# Patient Record
Sex: MALE | Race: BLACK OR AFRICAN AMERICAN | Employment: OTHER | URBAN - METROPOLITAN AREA
[De-identification: names, ages, dates, MRNs, and addresses within clinical notes are randomized per-mention and may not be internally consistent; named-entity substitution may affect disease eponyms.]

---

## 2017-03-19 RX ORDER — PHENOBARBITAL 60 MG/1
TABLET ORAL
Qty: 60 TAB | Refills: 0 | Status: SHIPPED | OUTPATIENT
Start: 2017-03-19

## 2017-11-28 ENCOUNTER — HOSPITAL ENCOUNTER (EMERGENCY)
Age: 57
Discharge: HOME | End: 2017-11-28
Attending: EMERGENCY MEDICINE
Payer: MEDICAID

## 2017-11-28 VITALS
DIASTOLIC BLOOD PRESSURE: 77 MMHG | HEART RATE: 88 BPM | BODY MASS INDEX: 19.9 KG/M2 | OXYGEN SATURATION: 97 % | SYSTOLIC BLOOD PRESSURE: 116 MMHG | RESPIRATION RATE: 16 BRPM | WEIGHT: 155 LBS | TEMPERATURE: 98.1 F

## 2017-11-28 DIAGNOSIS — K04.7 DENTAL ABSCESS: Primary | ICD-10-CM

## 2017-11-28 LAB — GLUCOSE BLD STRIP.AUTO-MCNC: 93 MG/DL (ref 70–110)

## 2017-11-28 PROCEDURE — 99283 EMERGENCY DEPT VISIT LOW MDM: CPT

## 2017-11-28 PROCEDURE — 82962 GLUCOSE BLOOD TEST: CPT

## 2017-11-28 PROCEDURE — 74011250637 HC RX REV CODE- 250/637: Performed by: PHYSICIAN ASSISTANT

## 2017-11-28 PROCEDURE — 75810000139 HC PUNCT ASPIRATION ABCESS

## 2017-11-28 RX ORDER — ACETAMINOPHEN AND CODEINE PHOSPHATE 300; 30 MG/1; MG/1
1 TABLET ORAL
Qty: 8 TAB | Refills: 0 | Status: SHIPPED | OUTPATIENT
Start: 2017-11-28 | End: 2021-05-01

## 2017-11-28 RX ORDER — ACETAMINOPHEN 500 MG
1000 TABLET ORAL
Status: COMPLETED | OUTPATIENT
Start: 2017-11-28 | End: 2017-11-28

## 2017-11-28 RX ORDER — PENICILLIN V POTASSIUM 500 MG/1
500 TABLET, FILM COATED ORAL 4 TIMES DAILY
Qty: 28 TAB | Refills: 0 | Status: SHIPPED | OUTPATIENT
Start: 2017-11-28 | End: 2017-12-05

## 2017-11-28 RX ORDER — PENICILLIN V POTASSIUM 250 MG/1
500 TABLET, FILM COATED ORAL
Status: COMPLETED | OUTPATIENT
Start: 2017-11-28 | End: 2017-11-28

## 2017-11-28 RX ADMIN — ACETAMINOPHEN 1000 MG: 500 TABLET ORAL at 12:13

## 2017-11-28 RX ADMIN — PENICILLIN V POTASSIUM 500 MG: 250 TABLET, FILM COATED ORAL at 12:13

## 2017-11-28 NOTE — ED TRIAGE NOTES
Patient states his face has been swelling for the past two days. He says whatever is coming out of it taste really bad. He denies having a dentist at this time.

## 2017-11-28 NOTE — ED PROVIDER NOTES
EMERGENCY DEPARTMENT HISTORY AND PHYSICAL EXAM    11:55 AM      Date: 11/28/2017  Patient Name: Clarita George    History of Presenting Illness     Chief Complaint   Patient presents with    Dental Pain         History Provided By: Patient    Chief Complaint: Dental pain  Duration: 2  Days  Timing:  Constant  Location: upper tooth pain  Quality: Aching  Severity: 6 out of 10  Associated Symptoms: Denies Fever, chills, or throat swelling. 11:56 AM Clarita George is a 59 y.o. male with h/o DM, Sz. And HTN who presents to ED complaining of constant aching upper tooth pain with a pain severity of 6/10  onset 2 days ago. Denies fever, chills, and sore throat. Patient has a shx of tobacco and alcohol use. Has not seen a dentist in years. No further complaints at this time. PCP: Basim Felder MD      PCP: Basim Felder MD    Current Facility-Administered Medications   Medication Dose Route Frequency Provider Last Rate Last Dose    penicillin v potassium (VEETID) tablet 500 mg  500 mg Oral NOW Sonia Gonzalez        acetaminophen (TYLENOL) tablet 1,000 mg  1,000 mg Oral NOW Sonia Gonzalez         Current Outpatient Prescriptions   Medication Sig Dispense Refill    penicillin v potassium (VEETID) 500 mg tablet Take 1 Tab by mouth four (4) times daily for 7 days. 28 Tab 0    acetaminophen-codeine (TYLENOL-CODEINE #3) 300-30 mg per tablet Take 1 Tab by mouth every four (4) hours as needed for Pain. Max Daily Amount: 6 Tabs. 8 Tab 0    PHENobarbital (LUMINAL) 60 mg tablet take 1 tablet by mouth twice a day 60 Tab 0    phenytoin ER (DILANTIN EXTENDED) 100 mg ER capsule Take 300 mg by mouth daily. Indications: EPILEPSY      phenytoin ER (DILANTIN EXTENDED) 100 mg ER capsule Take 200 mg by mouth nightly. Indications: EPILEPSY      metFORMIN (GLUCOPHAGE) 500 mg tablet Take 500 mg by mouth two (2) times daily (with meals).  Indications: TYPE 2 DIABETES MELLITUS      hydrochlorothiazide (HYDRODIURIL) 25 mg tablet Take 25 mg by mouth daily. Past History     Past Medical History:  Past Medical History:   Diagnosis Date    Diabetes ([facility] [address]     Hepatitis C     Hypertension     Seizures ([facility] [address]     last seizure few weeks ago (12/8/14), gets them periodically       Past Surgical History:  Past Surgical History:   Procedure Laterality Date    HX ORTHOPAEDIC Left     foot operation       Family History:  No family history on file. Social History:  Social History   Substance Use Topics    Smoking status: Current Every Day Smoker     Packs/day: 0.50     Years: 44.00    Smokeless tobacco: Never Used    Alcohol use Yes      Comment: occasionally       Allergies:  No Known Allergies      Review of Systems     Review of Systems   Constitutional: Negative for chills and fever. HENT: Positive for dental problem. Negative for sore throat. All other systems reviewed and are negative. Physical Exam     Visit Vitals    /77 (BP 1 Location: Left arm, BP Patient Position: At rest;Sitting)    Pulse 88    Temp 98.1 °F (36.7 °C)    Resp 16    Wt 70.3 kg (155 lb)    SpO2 97%    BMI 19.9 kg/m2       Physical Exam   Constitutional: He is oriented to person, place, and time. He appears well-developed and well-nourished. No distress. HENT:   Head: Normocephalic and atraumatic. Right Ear: External ear normal.   Left Ear: External ear normal.   Nose: Nose normal.   Mouth/Throat: Oropharynx is clear and moist.       Mild left facial edema no extension past mandible. 2 cm left upper gumline abscess. Patent posterior pharynx. No uvula swelling, tolerating secretions. Eyes: Pupils are equal, round, and reactive to light. No scleral icterus. Neck: Normal range of motion. Neck supple. No JVD present. No tracheal deviation present. No thyromegaly present. Cardiovascular: Normal rate, regular rhythm, normal heart sounds and intact distal pulses.   Exam reveals no gallop and no friction rub.    No murmur heard. Pulmonary/Chest: Effort normal and breath sounds normal. No respiratory distress. He has no wheezes. He has no rales. Lymphadenopathy:     He has no cervical adenopathy. Neurological: He is alert and oriented to person, place, and time. No sensory loss, Gait normal, Motor 5/5   Skin: Skin is warm and dry. Psychiatric: He has a normal mood and affect. His behavior is normal. Judgment and thought content normal.   Nursing note and vitals reviewed. Diagnostic Study Results     Labs -  No results found for this or any previous visit (from the past 12 hour(s)). Radiologic Studies -   No orders to display         Medical Decision Making   I am the first provider for this patient. I reviewed the vital signs, available nursing notes, past medical history, past surgical history, family history and social history. Vital Signs-Reviewed the patient's vital signs. Procedures:   Incision and drainage L upper dental abscess:. Pt with written consent for procedure, discussed risks of procedure, including infection, bleeding, or other complication. 1 mL Lidocaine 2% injected into dental abscess, 18 gauge needle used for aspiration, ~3mL of purulent drainage, mild bleeding after, no complications, patient tolerated well. Diagnosis     Clinical Impression:   1. Dental abscess        Disposition: home    Follow-up Information     Follow up With Details Comments Contact Info    [facility]  If symptoms worsen [address] [place] [address] Schedule an appointment as soon as possible for a visit  [phone]  [phone]           Patient's Medications   Start Taking    ACETAMINOPHEN-CODEINE (TYLENOL-CODEINE #3) 300-30 MG PER TABLET    Take 1 Tab by mouth every four (4) hours as needed for Pain. Max Daily Amount: 6 Tabs.     PENICILLIN V POTASSIUM (VEETID) 500 MG TABLET    Take 1 Tab by mouth four (4) times daily for 7 days. Continue Taking    HYDROCHLOROTHIAZIDE (HYDRODIURIL) 25 MG TABLET    Take 25 mg by mouth daily. METFORMIN (GLUCOPHAGE) 500 MG TABLET    Take 500 mg by mouth two (2) times daily (with meals). Indications: TYPE 2 DIABETES MELLITUS    PHENOBARBITAL (LUMINAL) 60 MG TABLET    take 1 tablet by mouth twice a day    PHENYTOIN ER (DILANTIN EXTENDED) 100 MG ER CAPSULE    Take 300 mg by mouth daily. Indications: EPILEPSY    PHENYTOIN ER (DILANTIN EXTENDED) 100 MG ER CAPSULE    Take 200 mg by mouth nightly. Indications: EPILEPSY   These Medications have changed    No medications on file   Stop Taking    HYDROCODONE-ACETAMINOPHEN (NORCO) 5-325 MG PER TABLET    Take 1-2 tablets PO every 4-6 hours as needed for pain control. If over the counter ibuprofen or acetaminophen was suggested, then only take the vicodin for pain not well controlled with the over the counter medication. _______________________________    Attestations:  Scribe Attestation     Terra Noyola acting as a scribe for and in the presence of Levell Carrel, [place]      November 28, 2017 at 11:55 AM       Provider Attestation:      I personally performed the services described in the documentation, reviewed the documentation, as recorded by the scribe in my presence, and it accurately and completely records my words and actions.  November 28, 2017 at 11:55 AM - Levell Carrel, [place].    _______________________________

## 2017-11-28 NOTE — DISCHARGE INSTRUCTIONS
Abscessed Tooth: Care Instructions  Your Care Instructions    An abscessed tooth is a tooth that has a pocket of pus in the tissues around it. Pus forms when the body tries to fight an infection caused by bacteria. If the pus cannot drain, it forms an abscess. An abscessed tooth can cause red, swollen gums and throbbing pain, especially when you chew. You may have a bad taste in your mouth and a fever, and your jaw may swell. Damage to the tooth, untreated tooth decay, or gum disease can cause an abscessed tooth. An abscessed tooth needs to be treated by a dental professional right away. If it is not treated, the infection could spread to other parts of your body. Your dentist will give you antibiotics to stop the infection. He or she may make a hole in the tooth or cut open (nadege) the abscess inside your mouth so that the infection can drain, which should relieve your pain. You may need to have a root canal treatment, which tries to save your tooth by taking out the infected pulp and replacing it with a healing medicine and/or a filling. If these treatments do not work, your tooth may have to be removed. Follow-up care is a key part of your treatment and safety. Be sure to make and go to all appointments, and call your doctor if you are having problems. It's also a good idea to know your test results and keep a list of the medicines you take. How can you care for yourself at home? · Reduce pain and swelling in your face and jaw by putting ice or a cold pack on the outside of your cheek for 10 to 20 minutes at a time. Put a thin cloth between the ice and your skin. · Take pain medicines exactly as directed. ¨ If the doctor gave you a prescription medicine for pain, take it as prescribed. ¨ If you are not taking a prescription pain medicine, ask your doctor if you can take an over-the-counter medicine. · Take your antibiotics as directed. Do not stop taking them just because you feel better.  You need to take the full course of antibiotics. To prevent tooth abscess  · Brush and floss every day, and have regular dental checkups. · Eat a healthy diet, and avoid sugary foods and drinks. · Do not smoke, use e-cigarettes with nicotine, or use spit tobacco. Tobacco and nicotine slow your ability to heal. Tobacco also increases your risk for gum disease and cancer of the mouth and throat. If you need help quitting, talk to your doctor about stop-smoking programs and medicines. These can increase your chances of quitting for good. When should you call for help? Call 911 anytime you think you may need emergency care. For example, call if:  ? · You have trouble breathing. ?Call your doctor now or seek immediate medical care if:  ? · You have new or worse symptoms of infection, such as:  ¨ Increased pain, swelling, warmth, or redness. ¨ Red streaks leading from the area. ¨ Pus draining from the area. ¨ A fever. ? Watch closely for changes in your health, and be sure to contact your doctor if:  ? · You do not get better as expected. Where can you learn more? Go to http://anibal-tiffany.info/. Enter D808 in the search box to learn more about \"Abscessed Tooth: Care Instructions. \"  Current as of: May 12, 2017  Content Version: 11.4  © 5427-3340 [employer]. Care instructions adapted under license by [employer] (which disclaims liability or warranty for this information). If you have questions about a medical condition or this instruction, always ask your healthcare professional. [place] [address] any warranty or liability for your use of this information. [employer] Activation    Thank you for requesting access to [employer]. Please follow the instructions below to securely access and download your online medical record. [employer] allows you to send messages to your doctor, view your test results, renew your prescriptions, schedule appointments, and more.     How Do I Sign Up? 1. In your internet browser, go to [email or web address]  2. Click on the First Time User? Click Here link in the Sign In box. You will be redirect to the New Member Sign Up page. 3. Enter your [employer] Access Code exactly as it appears below. You will not need to use this code after youve completed the sign-up process. If you do not sign up before the expiration date, you must request a new code. [employer] Access Code: [email or web address]  Expires: 2018 11:56 AM (This is the date your [employer] access code will )    4. Enter the last four digits of your Social Security Number (xxxx) and Date of Birth (mm/dd/yyyy) as indicated and click Submit. You will be taken to the next sign-up page. 5. Create a [employer] IQ ID. This will be your [employer] login ID and cannot be changed, so think of one that is secure and easy to remember. 6. Create a [employer] password. You can change your password at any time. 7. Enter your Password Reset Question and Answer. This can be used at a later time if you forget your password. 8. Enter your e-mail address. You will receive e-mail notification when new information is available in [address] 9. Click Sign Up. You can now view and download portions of your medical record. 10. Click the Download Summary menu link to download a portable copy of your medical information. Additional Information    If you have questions, please visit the Frequently Asked Questions section of the [employer] website at [email or web address] Remember, [employer] is NOT to be used for urgent needs. For medical emergencies, dial 911.

## 2019-04-25 ENCOUNTER — HOSPITAL ENCOUNTER (EMERGENCY)
Age: 59
Discharge: HOME | End: 2019-04-25
Attending: EMERGENCY MEDICINE
Payer: MEDICAID

## 2019-04-25 VITALS
RESPIRATION RATE: 16 BRPM | SYSTOLIC BLOOD PRESSURE: 130 MMHG | OXYGEN SATURATION: 100 % | TEMPERATURE: 97.7 F | DIASTOLIC BLOOD PRESSURE: 88 MMHG | HEART RATE: 64 BPM

## 2019-04-25 DIAGNOSIS — K04.7 DENTAL ABSCESS: Primary | ICD-10-CM

## 2019-04-25 PROCEDURE — 99283 EMERGENCY DEPT VISIT LOW MDM: CPT

## 2019-04-25 PROCEDURE — 74011250637 HC RX REV CODE- 250/637: Performed by: PHYSICIAN ASSISTANT

## 2019-04-25 PROCEDURE — 75810000289 HC I&D ABSCESS SIMP/COMP/MULT

## 2019-04-25 RX ORDER — PENICILLIN V POTASSIUM 500 MG/1
500 TABLET, FILM COATED ORAL 2 TIMES DAILY
Qty: 14 TAB | Refills: 0 | Status: SHIPPED | OUTPATIENT
Start: 2019-04-25 | End: 2019-05-02

## 2019-04-25 RX ORDER — HYDROCODONE BITARTRATE AND ACETAMINOPHEN 5; 325 MG/1; MG/1
1 TABLET ORAL
Qty: 18 TAB | Refills: 0 | Status: SHIPPED | OUTPATIENT
Start: 2019-04-25 | End: 2019-04-28

## 2019-04-25 RX ORDER — OXYCODONE AND ACETAMINOPHEN 5; 325 MG/1; MG/1
1 TABLET ORAL
Status: COMPLETED | OUTPATIENT
Start: 2019-04-25 | End: 2019-04-25

## 2019-04-25 RX ORDER — CHLORHEXIDINE GLUCONATE 1.2 MG/ML
15 RINSE ORAL 2 TIMES DAILY
Qty: 420 ML | Refills: 0 | Status: SHIPPED | OUTPATIENT
Start: 2019-04-25 | End: 2019-05-09

## 2019-04-25 RX ADMIN — OXYCODONE HYDROCHLORIDE AND ACETAMINOPHEN 1 TABLET: 5; 325 TABLET ORAL at 21:47

## 2019-04-26 NOTE — ED NOTES
Discharge instructions explained and pt verbalized understanding. All questions addressed and answered. Patient ambulated out in a stable condition rx3 given

## 2019-04-26 NOTE — ED PROVIDER NOTES
EMERGENCY DEPARTMENT HISTORY AND PHYSICAL EXAM 
 
9:43 PM 
 
 
Date: 4/25/2019 Patient Name: Jenny Infante History of Presenting Illness Chief Complaint Patient presents with  Dental Pain History Provided By: Patient Chief Complaint: dental pain Additional History (Context): Jenny Infante is a 62 y.o. Jeremiah Gray presents with dental pain. Right lower dental pain for the past 3 days. Pain is 10 out of 10. He has swelling of the jaw and a lump under his chin. He denies fevers. He has no trouble swallowing or breathing. He denies fevers. The pain is uncontrolled. Is not taking anything for the pain. PCP: Castillo Heredia MD 
 
Current Facility-Administered Medications Medication Dose Route Frequency Provider Last Rate Last Dose  oxyCODONE-acetaminophen (PERCOCET) 5-325 mg per tablet 1 Tab  1 Tab Oral NOW Sanju Aguilera PA-C Current Outpatient Medications Medication Sig Dispense Refill  chlorhexidine (PERIDEX) 0.12 % solution 15 mL by Swish and Spit route two (2) times a day for 14 days. 420 mL 0  
 penicillin v potassium (VEETID) 500 mg tablet Take 1 Tab by mouth two (2) times a day for 7 days. 14 Tab 0  
 HYDROcodone-acetaminophen (NORCO) 5-325 mg per tablet Take 1 Tab by mouth every four (4) hours as needed for Pain for up to 3 days. Max Daily Amount: 6 Tabs. 18 Tab 0  
 acetaminophen-codeine (TYLENOL-CODEINE #3) 300-30 mg per tablet Take 1 Tab by mouth every four (4) hours as needed for Pain. Max Daily Amount: 6 Tabs. 8 Tab 0  
 PHENobarbital (LUMINAL) 60 mg tablet take 1 tablet by mouth twice a day 60 Tab 0  phenytoin ER (DILANTIN EXTENDED) 100 mg ER capsule Take 300 mg by mouth daily. Indications: EPILEPSY  phenytoin ER (DILANTIN EXTENDED) 100 mg ER capsule Take 200 mg by mouth nightly. Indications: EPILEPSY  metFORMIN (GLUCOPHAGE) 500 mg tablet Take 500 mg by mouth two (2) times daily (with meals). Indications: TYPE 2 DIABETES MELLITUS  hydrochlorothiazide (HYDRODIURIL) 25 mg tablet Take 25 mg by mouth daily. Past History Past Medical History: 
Past Medical History:  
Diagnosis Date  Diabetes ([facility] [address]  Hepatitis C   
 Hypertension  Seizures ([facility] [address]   
 last seizure few weeks ago (12/8/14), gets them periodically Past Surgical History: 
Past Surgical History:  
Procedure Laterality Date  HX ORTHOPAEDIC Left   
 foot operation Family History: No family history on file. Social History: 
Social History Tobacco Use  Smoking status: Current Every Day Smoker Packs/day: 0.50 Years: 44.00 Pack years: 22.00  Smokeless tobacco: Never Used Substance Use Topics  Alcohol use: Yes Comment: occasionally  Drug use: No  
 
 
Allergies: 
No Known Allergies Review of Systems Review of Systems Constitutional: Negative for fever. HENT: Positive for dental problem and facial swelling. Eyes: Negative for visual disturbance. Respiratory: Negative for shortness of breath. Cardiovascular: Negative for chest pain. Gastrointestinal: Negative for abdominal pain. Genitourinary: Negative for dysuria. Musculoskeletal: Negative for neck pain. Skin: Negative for rash. Neurological: Negative for dizziness. Psychiatric/Behavioral: Negative for confusion. All other systems reviewed and are negative. Physical Exam  
 
Visit Vitals /86 (BP 1 Location: Left arm, BP Patient Position: At rest) Pulse 64 Temp 98.6 °F (37 °C) Resp 16 SpO2 100% Physical Exam  
Constitutional: He appears well-developed and well-nourished. No distress. HENT:  
Head: Normocephalic and atraumatic. Dental abscess adjacent to tooth 30 through 27 with gingival swelling, fluctuance and tenderness. There is also a palpable enlarged lymph nodes sub-mental.  No diffuse swelling of the neck. Airway is patent. Eyes: Conjunctivae are normal.  
Neck: Normal range of motion. Neck supple. Cardiovascular: Normal rate. Pulmonary/Chest: Effort normal.  
Abdominal: Soft. Musculoskeletal: Normal range of motion. Neurological: He is alert. Skin: Skin is warm and dry. He is not diaphoretic. Psychiatric: He has a normal mood and affect. Nursing note and vitals reviewed. Diagnostic Study Results Labs - No results found for this or any previous visit (from the past 12 hour(s)). Radiologic Studies - No orders to display Medical Decision Making I am the first provider for this patient. I reviewed the vital signs, available nursing notes, past medical history, past surgical history, family history and social history. Vital Signs-Reviewed the patient's vital signs. Records Reviewed: Nursing Notes (Time of Review: 9:43 PM) ED Course: Progress Notes, Reevaluation, and Consults: 
 
Provider Notes (Medical Decision Making): MDM Number of Diagnoses or Management Options Dental abscess:  
Diagnosis management comments: Dental abscess drained today. Started on antibiotics and Peridex. Referred to dentist. 
 
  
I&D Abcess Simple Date/Time: 4/25/2019 9:45 PM 
Performed by: Marilee Corrigan PA-C Authorized by: Marilee Corrigan PA-C Consent:  
  Consent obtained:  Verbal 
  Consent given by:  Patient Risks discussed:  Bleeding, damage to other organs, incomplete drainage, infection and pain Alternatives discussed:  Alternative treatment Location:  
  Type:  Abscess Location:  Mouth Mouth location:  Alveolar process Anesthesia (see MAR for exact dosages): Anesthesia method:  Local infiltration Local anesthetic:  Lidocaine 1% w/o epi Procedure type:  
  Complexity:  Simple Procedure details:  
  Needle aspiration: no Incision types:  Single straight Incision depth:  Submucosal 
  Scalpel blade:  11 Wound management:  Irrigated with saline Drainage:  Purulent Drainage amount: Moderate Wound treatment:  Wound left open Packing materials:  None Post-procedure details:  
  Patient tolerance of procedure: Tolerated well, no immediate complications Diagnosis Clinical Impression: 1. Dental abscess Disposition: Discharged Follow-up Information Follow up With Specialties Details Why Contact Info [address] [place]  [address] 
[phone] [address]    [phone] [facility] EMERGENCY DEPT Emergency Medicine  Immediately if symptoms worsen Yoanna [address] Patient's Medications Start Taking CHLORHEXIDINE (PERIDEX) 0.12 % SOLUTION    15 mL by Swish and Spit route two (2) times a day for 14 days. HYDROCODONE-ACETAMINOPHEN (NORCO) 5-325 MG PER TABLET    Take 1 Tab by mouth every four (4) hours as needed for Pain for up to 3 days. Max Daily Amount: 6 Tabs. PENICILLIN V POTASSIUM (VEETID) 500 MG TABLET    Take 1 Tab by mouth two (2) times a day for 7 days. Continue Taking ACETAMINOPHEN-CODEINE (TYLENOL-CODEINE #3) 300-30 MG PER TABLET    Take 1 Tab by mouth every four (4) hours as needed for Pain. Max Daily Amount: 6 Tabs. HYDROCHLOROTHIAZIDE (HYDRODIURIL) 25 MG TABLET    Take 25 mg by mouth daily. METFORMIN (GLUCOPHAGE) 500 MG TABLET    Take 500 mg by mouth two (2) times daily (with meals). Indications: TYPE 2 DIABETES MELLITUS PHENOBARBITAL (LUMINAL) 60 MG TABLET    take 1 tablet by mouth twice a day PHENYTOIN ER (DILANTIN EXTENDED) 100 MG ER CAPSULE    Take 300 mg by mouth daily. Indications: EPILEPSY PHENYTOIN ER (DILANTIN EXTENDED) 100 MG ER CAPSULE    Take 200 mg by mouth nightly. Indications: EPILEPSY These Medications have changed No medications on file Stop Taking No medications on file  
 
_______________________________ Attestations: 
Landy Attestation Sanju Aguilera PA-C acting as a scribe for and in the presence of L[employer] April 25, 2019 at 9:46 PM 
    
Provider Attestation:     
I personally performed the services described in the documentation, reviewed the documentation, as recorded by the scribe in my presence, and it accurately and completely records my words and actions. April 25, 2019 at 9:46 PM - MAXIMO Pearson 
_______________________________

## 2019-04-26 NOTE — DISCHARGE INSTRUCTIONS
Patient Education        Abscessed Tooth: Care Instructions  Your Care Instructions    An abscessed tooth is a tooth that has a pocket of pus in the tissues around it. Pus forms when the body tries to fight an infection caused by bacteria. If the pus cannot drain, it forms an abscess. An abscessed tooth can cause red, swollen gums and throbbing pain, especially when you chew. You may have a bad taste in your mouth and a fever, and your jaw may swell. Damage to the tooth, untreated tooth decay, or gum disease can cause an abscessed tooth. An abscessed tooth needs to be treated by a dental professional right away. If it is not treated, the infection could spread to other parts of your body. Your dentist will give you antibiotics to stop the infection. He or she may make a hole in the tooth or cut open (nadege) the abscess inside your mouth so that the infection can drain, which should relieve your pain. You may need to have a root canal treatment, which tries to save your tooth by taking out the infected pulp and replacing it with a healing medicine and/or a filling. If these treatments do not work, your tooth may have to be removed. Follow-up care is a key part of your treatment and safety. Be sure to make and go to all appointments, and call your doctor if you are having problems. It's also a good idea to know your test results and keep a list of the medicines you take. How can you care for yourself at home? · Reduce pain and swelling in your face and jaw by putting ice or a cold pack on the outside of your cheek for 10 to 20 minutes at a time. Put a thin cloth between the ice and your skin. · Take pain medicines exactly as directed. ? If the doctor gave you a prescription medicine for pain, take it as prescribed. ? If you are not taking a prescription pain medicine, ask your doctor if you can take an over-the-counter medicine. · Take your antibiotics as directed.  Do not stop taking them just because you feel better. You need to take the full course of antibiotics. To prevent tooth abscess  · Brush and floss every day, and have regular dental checkups. · Eat a healthy diet, and avoid sugary foods and drinks. · Do not smoke, use e-cigarettes with nicotine, or use spit tobacco. Tobacco and nicotine slow your ability to heal. Tobacco also increases your risk for gum disease and cancer of the mouth and throat. If you need help quitting, talk to your doctor about stop-smoking programs and medicines. These can increase your chances of quitting for good. When should you call for help? Call 911 anytime you think you may need emergency care. For example, call if:    · You have trouble breathing.    Call your doctor now or seek immediate medical care if:    · You have new or worse symptoms of infection, such as:  ? Increased pain, swelling, warmth, or redness. ? Red streaks leading from the area. ? Pus draining from the area. ? A fever.    Watch closely for changes in your health, and be sure to contact your doctor if:    · You do not get better as expected. Where can you learn more? Go to http://anibal-tiffany.info/. Enter W330 in the search box to learn more about \"Abscessed Tooth: Care Instructions. \"  Current as of: March 27, 2018  Content Version: 11.9  © 6607-6850 [employer]. Care instructions adapted under license by [employer] (which disclaims liability or warranty for this information). If you have questions about a medical condition or this instruction, always ask your healthcare professional. [place] [address] any warranty or liability for your use of this information.

## 2020-02-22 ENCOUNTER — HOSPITAL ENCOUNTER (EMERGENCY)
Age: 60
Discharge: HOME | End: 2020-02-22
Attending: EMERGENCY MEDICINE
Payer: MEDICAID

## 2020-02-22 VITALS
DIASTOLIC BLOOD PRESSURE: 85 MMHG | HEIGHT: 74 IN | HEART RATE: 91 BPM | WEIGHT: 155 LBS | SYSTOLIC BLOOD PRESSURE: 115 MMHG | TEMPERATURE: 98.3 F | OXYGEN SATURATION: 100 % | RESPIRATION RATE: 18 BRPM | BODY MASS INDEX: 19.89 KG/M2

## 2020-02-22 DIAGNOSIS — H60.501 ACUTE OTITIS EXTERNA OF RIGHT EAR, UNSPECIFIED TYPE: Primary | ICD-10-CM

## 2020-02-22 PROCEDURE — 74011250637 HC RX REV CODE- 250/637: Performed by: STUDENT IN AN ORGANIZED HEALTH CARE EDUCATION/TRAINING PROGRAM

## 2020-02-22 PROCEDURE — 99283 EMERGENCY DEPT VISIT LOW MDM: CPT

## 2020-02-22 RX ORDER — CIPROFLOXACIN AND DEXAMETHASONE 3; 1 MG/ML; MG/ML
4 SUSPENSION/ DROPS AURICULAR (OTIC) 2 TIMES DAILY
Qty: 7.5 ML | Refills: 0 | Status: SHIPPED | OUTPATIENT
Start: 2020-02-22 | End: 2020-02-29

## 2020-02-22 RX ADMIN — CIPROFLOXACIN HYDROCHLORIDE AND HYDROCORTISONE 3 DROP: 2; 10 SUSPENSION AURICULAR (OTIC) at 13:02

## 2020-02-22 NOTE — ED TRIAGE NOTES
PT reports, \"My ear feels like is has been stuffed up X 6 days, worse on the right side, difficulty hearing on right side. \".

## 2020-02-22 NOTE — DISCHARGE INSTRUCTIONS
Patient Education        Swimmer's Ear: Care Instructions  Your Care Instructions    Swimmer's ear (otitis externa) is inflammation or infection of the ear canal. This is the passage that leads from the outer ear to the eardrum. Any water, sand, or other debris that gets into the ear canal and stays there can cause swimmer's ear. Putting cotton swabs or other items in the ear to clean it can also cause this problem. Swimmer's ear can be very painful. But you can treat the pain and infection with medicines. You should feel better in a few days. Follow-up care is a key part of your treatment and safety. Be sure to make and go to all appointments, and call your doctor if you are having problems. It's also a good idea to know your test results and keep a list of the medicines you take. How can you care for yourself at home? Cleaning and care  · Use antibiotic drops as your doctor directs. · Do not insert ear drops (other than the antibiotic ear drops) or anything else into the ear unless your doctor has told you to. · Avoid getting water in the ear until the problem clears up. Use cotton lightly coated with petroleum jelly as an earplug. Do not use plastic earplugs. · Use a hair dryer set on low to carefully dry the ear after you shower. · To ease ear pain, hold a warm washcloth against your ear. · Take pain medicines exactly as directed. ? If the doctor gave you a prescription medicine for pain, take it as prescribed. ? If you are not taking a prescription pain medicine, ask your doctor if you can take an over-the-counter medicine. Inserting ear drops  · Warm the drops to body temperature by rolling the container in your hands. Or you can place it in a cup of warm water for a few minutes. · Lie down, with your ear facing up. · Place drops inside the ear. Follow your doctor's instructions (or the directions on the label) for how many drops to use.  Gently wiggle the outer ear or pull the ear up and back to help the drops get into the ear. · It's important to keep the liquid in the ear canal for 3 to 5 minutes. When should you call for help? Call your doctor now or seek immediate medical care if:    · You have a new or higher fever.     · You have new or worse pain, swelling, warmth, or redness around or behind your ear.     · You have new or increasing pus or blood draining from your ear.    Watch closely for changes in your health, and be sure to contact your doctor if:    · You are not getting better after 2 days (48 hours). Where can you learn more? Go to http://anibal-tiffany.info/. Enter C706 in the search box to learn more about \"Swimmer's Ear: Care Instructions. \"  Current as of: October 21, 2018  Content Version: 12.2  © 7060-6906 [employer]. Care instructions adapted under license by [employer] (which disclaims liability or warranty for this information). If you have questions about a medical condition or this instruction, always ask your healthcare professional. [address] any warranty or liability for your use of this information.

## 2020-02-22 NOTE — ED PROVIDER NOTES
EMERGENCY DEPARTMENT HISTORY AND PHYSICAL EXAM      Date: 2/22/2020  Patient Name: Augusto Thorpe    History of Presenting Illness     Chief Complaint   Patient presents with    Hearing Problem       History Provided By: Patient    Chief Complaint:  Ear pain, foreign body sensation    Additional History (Context): Augusto Thorpe is a 56 y.o. male with hx of seizure, HTN who presents with right ear discomfort and foreign body sensation. Patient states that symptoms started approximately 8 days ago as mild discomfort after using a Q-tip in his ear. He suspects he may have gotten some tissue in there that was unsure. Discomfort has progressed and worsened since onset and now feels like a dull throbbing pain that radiates to the right side of his head. He denies any photophobia, nausea, vomiting, fever, chills, neck pain or stiffness. He endorses mild muffled hearing on the right side due to the symptoms the left side is normal.    PCP: Angel Fernandez MD    Current Facility-Administered Medications   Medication Dose Route Frequency Provider Last Rate Last Dose    ciprofloxacin-hydrocortisone (CIPRO HC OTIC) otic suspension 3 Drop  3 Drop Right Ear BID Yina Starks MD         Current Outpatient Medications   Medication Sig Dispense Refill    ciprofloxacin-dexamethasone (CIPRODEX) 0.3-0.1 % otic suspension Administer 4 Drops in right ear two (2) times a day for 7 days. 7.5 mL 0    acetaminophen-codeine (TYLENOL-CODEINE #3) 300-30 mg per tablet Take 1 Tab by mouth every four (4) hours as needed for Pain. Max Daily Amount: 6 Tabs. 8 Tab 0    PHENobarbital (LUMINAL) 60 mg tablet take 1 tablet by mouth twice a day 60 Tab 0    phenytoin ER (DILANTIN EXTENDED) 100 mg ER capsule Take 300 mg by mouth daily. Indications: EPILEPSY      phenytoin ER (DILANTIN EXTENDED) 100 mg ER capsule Take 200 mg by mouth nightly.  Indications: EPILEPSY      metFORMIN (GLUCOPHAGE) 500 mg tablet Take 500 mg by mouth two (2) times daily (with meals). Indications: TYPE 2 DIABETES MELLITUS      hydrochlorothiazide (HYDRODIURIL) 25 mg tablet Take 25 mg by mouth daily. Past History     Past Medical History:  Past Medical History:   Diagnosis Date    [facility] [address]     Hepatitis C     Hypertension     Seizures ([facility] [address]     last seizure few weeks ago (12/8/14), gets them periodically       Past Surgical History:  Past Surgical History:   Procedure Laterality Date    HX ORTHOPAEDIC Left     foot operation       Family History:  No family history on file. Social History:  Social History     Tobacco Use    Smoking status: Current Every Day Smoker     Packs/day: 0.50     Years: 44.00     Pack years: 22.00    Smokeless tobacco: Never Used   Substance Use Topics    Alcohol use: Yes     Comment: occasionally    Drug use: No       Allergies:  No Known Allergies      Review of Systems   Review of Systems   Constitutional: Negative for chills and fever. HENT: Positive for ear pain and hearing loss. Negative for congestion, rhinorrhea and sinus pain. Eyes: Negative for photophobia, pain, discharge and visual disturbance. Neurological: Positive for headaches. Negative for dizziness, facial asymmetry, weakness, light-headedness and numbness. Physical Exam     Vitals:    02/22/20 1057   BP: 115/85   Pulse: 91   Resp: 18   Temp: 98.3 °F (36.8 °C)   SpO2: 100%   Weight: 70.3 kg (155 lb)   Height: 6' 2\" (1.88 m)     Physical Exam  Vitals signs and nursing note reviewed. Constitutional:       General: He is not in acute distress. HENT:      Head: Atraumatic. Right Ear: External ear normal. Decreased hearing noted. Swelling and tenderness present. There is impacted cerumen. No foreign body. No mastoid tenderness. Tympanic membrane is injected and erythematous. Tympanic membrane is not perforated or bulging.       Left Ear: Tympanic membrane, ear canal and external ear normal.      Mouth/Throat:      Mouth: Mucous membranes are moist.      Pharynx: Oropharynx is clear. Eyes:      Extraocular Movements: Extraocular movements intact. Pupils: Pupils are equal, round, and reactive to light. Neck:      Musculoskeletal: Normal range of motion and neck supple. No neck rigidity or muscular tenderness. Neurological:      General: No focal deficit present. Mental Status: He is alert and oriented to person, place, and time. Diagnostic Study Results     Labs -   No results found for this or any previous visit (from the past 12 hour(s)). Radiologic Studies -   No orders to display     CT Results  (Last 48 hours)    None        CXR Results  (Last 48 hours)    None            Medical Decision Making   I am the first provider for this patient. I reviewed the vital signs, available nursing notes, past medical history, past surgical history, family history and social history. Vital Signs-Reviewed the patient's vital signs. Pulse Oximetry Analysis - 100% on RA    Cardiac Monitor: n/a    EKG: n/a    Records Reviewed: Nursing Notes and Old Medical Records    Provider Notes (Medical Decision Making):     Ddx: Acute otitis externa, otitis media, foreign body, mastoiditis, malignant otitis externa, labyrinthitis, Ménière's, vestibular neuronitis, trigeminal neuralgia, drug ototoxicity. ED Course:        No foreign body appreciated on speculum exam of the external ear canal, though significant swelling and erythema noted. Will treat with topical gtt for acute otitis externa, however patient encouraged to follow-up with his primary care provider within 1 week for repeat evaluation to ensure that symptoms are improving and not worsening. Disposition:  Home    DISCHARGE NOTE:   11:30 AM    Pt has been reexamined. Patient has no new complaints, changes, or physical findings. Care plan outlined and precautions discussed. Results of exam were reviewed with the patient.  All medications were reviewed with the patient; will d/c home with ciprodex. All of pt's questions and concerns were addressed. Patient was instructed and agrees to follow up with PCP, as well as to return to the ED upon further deterioration. Patient is ready to go home. Follow-up Information     Follow up With Specialties Details Why Contact Info    Your primary care provider  Schedule an appointment as soon as possible for a visit in 1 week For re-evaluation     [facility] Emergency Medicine  If symptoms worsen [address] [facility] [address]  [phone]          Current Discharge Medication List      START taking these medications    Details   ciprofloxacin-dexamethasone (CIPRODEX) 0.3-0.1 % otic suspension Administer 4 Drops in right ear two (2) times a day for 7 days. Qty: 7.5 mL, Refills: 0                 Procedures:  Procedures    Core Measures: n/a    Diagnosis     Clinical Impression:   1. Acute otitis externa of right ear, unspecified type      I personally saw and examined the patient. I have reviewed and agree with the MLP's findings, including all diagnostic interpretations, and plans as written. I was present during the key portions of separately billed procedures. Examination of the right ear showed an erythematous right tympanic membrane with mild cerumen buildup. Agrees with antibiotics.   [address] Cavazos, DO

## 2020-05-15 ENCOUNTER — HOSPITAL ENCOUNTER (EMERGENCY)
Age: 60
Discharge: LEFT BEFORE DISCHARGE | End: 2020-05-16
Attending: EMERGENCY MEDICINE
Payer: MEDICAID

## 2020-05-15 VITALS
SYSTOLIC BLOOD PRESSURE: 122 MMHG | TEMPERATURE: 98.2 F | OXYGEN SATURATION: 98 % | RESPIRATION RATE: 20 BRPM | HEART RATE: 72 BPM | DIASTOLIC BLOOD PRESSURE: 83 MMHG

## 2020-05-15 DIAGNOSIS — Z53.20 LEFT BEFORE TREATMENT COMPLETED: Primary | ICD-10-CM

## 2020-05-15 PROCEDURE — 75810000275 HC EMERGENCY DEPT VISIT NO LEVEL OF CARE

## 2020-05-16 NOTE — ED TRIAGE NOTES
Patient reports with knot on right side of chin. He states that \"it keeps dropping down and I don't want it going in my throat\" He states that he noticed the nodule about 6 months ago.

## 2020-06-13 ENCOUNTER — HOSPITAL ENCOUNTER (EMERGENCY)
Age: 60
Discharge: HOME | End: 2020-06-13
Attending: EMERGENCY MEDICINE
Payer: MEDICAID

## 2020-06-13 VITALS
SYSTOLIC BLOOD PRESSURE: 123 MMHG | TEMPERATURE: 98.9 F | HEART RATE: 82 BPM | RESPIRATION RATE: 16 BRPM | DIASTOLIC BLOOD PRESSURE: 91 MMHG | OXYGEN SATURATION: 100 %

## 2020-06-13 DIAGNOSIS — K08.89 PAIN, DENTAL: Primary | ICD-10-CM

## 2020-06-13 PROCEDURE — 99281 EMR DPT VST MAYX REQ PHY/QHP: CPT

## 2020-06-13 RX ORDER — IBUPROFEN 600 MG/1
600 TABLET ORAL
Qty: 16 TAB | Refills: 0 | Status: SHIPPED | OUTPATIENT
Start: 2020-06-13 | End: 2020-06-17

## 2020-06-13 RX ORDER — CLINDAMYCIN HYDROCHLORIDE 300 MG/1
300 CAPSULE ORAL 4 TIMES DAILY
Qty: 28 CAP | Refills: 0 | Status: SHIPPED | OUTPATIENT
Start: 2020-06-13 | End: 2020-06-20

## 2020-06-13 NOTE — ED PROVIDER NOTES
EMERGENCY DEPARTMENT HISTORY AND PHYSICAL EXAM    1:49 PM      Date: 6/13/2020  Patient Name: Soha Mcclendon    History of Presenting Illness     Chief Complaint   Patient presents with    Dental Pain     History Provided By: Patient  Chief complaint: right first molar 10/10 dental pain   Duration: 2 days   Timing: constant   Location: right lower first molar   Quality: aching   Severity : 10/10   Modifying factors : none   Associated Symptoms: mild right jaw swelling     Additional History (Context): Soha Mcclendon is a 56 y.o. male with past medical history significant for hypertension, diabetes, and hepatitis. PCP: Laila Diana MD    Current Outpatient Medications   Medication Sig Dispense Refill    clindamycin (CLEOCIN) 300 mg capsule Take 1 Cap by mouth four (4) times daily for 7 days. 28 Cap 0    ibuprofen (MOTRIN) 600 mg tablet Take 1 Tab by mouth every six (6) hours as needed for Pain for up to 4 days. 16 Tab 0    acetaminophen-codeine (TYLENOL-CODEINE #3) 300-30 mg per tablet Take 1 Tab by mouth every four (4) hours as needed for Pain. Max Daily Amount: 6 Tabs. 8 Tab 0    PHENobarbital (LUMINAL) 60 mg tablet take 1 tablet by mouth twice a day 60 Tab 0    phenytoin ER (DILANTIN EXTENDED) 100 mg ER capsule Take 300 mg by mouth daily. Indications: EPILEPSY      phenytoin ER (DILANTIN EXTENDED) 100 mg ER capsule Take 200 mg by mouth nightly. Indications: EPILEPSY      metFORMIN (GLUCOPHAGE) 500 mg tablet Take 500 mg by mouth two (2) times daily (with meals). Indications: TYPE 2 DIABETES MELLITUS      hydrochlorothiazide (HYDRODIURIL) 25 mg tablet Take 25 mg by mouth daily.          Past History     Past Medical History:  Past Medical History:   Diagnosis Date    Diabetes ([address]     Hepatitis C     Hypertension     Seizures ([facility] [address]     last seizure few weeks ago (12/8/14), gets them periodically       Past Surgical History:  Past Surgical History:   Procedure Laterality Date    HX ORTHOPAEDIC Left     foot operation       Family History:  No family history on file. Social History:  Social History     Tobacco Use    Smoking status: Current Every Day Smoker     Packs/day: 0.50     Years: 44.00     Pack years: 22.00    Smokeless tobacco: Never Used   Substance Use Topics    Alcohol use: Yes     Comment: occasionally    Drug use: No       Allergies:  No Known Allergies      Review of Systems       Review of Systems   Constitutional: Negative for chills, diaphoresis, fatigue and fever. HENT: Positive for dental problem and facial swelling. Negative for congestion, drooling, ear pain, mouth sores, sinus pressure, sneezing, sore throat, trouble swallowing and voice change. Eyes: Negative for photophobia, pain and visual disturbance. Respiratory: Negative for cough, chest tightness, shortness of breath and wheezing. Cardiovascular: Negative for chest pain, palpitations and leg swelling. Gastrointestinal: Negative for abdominal pain, diarrhea, nausea and vomiting. Genitourinary: Negative for dysuria, flank pain and hematuria. Musculoskeletal: Negative for back pain, gait problem, neck pain and neck stiffness. Skin: Negative for rash and wound. Neurological: Negative for dizziness, weakness, light-headedness and headaches. Physical Exam     Visit Vitals  BP (!) 123/91   Pulse 82   Temp 98.9 °F (37.2 °C)   Resp 16   SpO2 100%         Physical Exam  Vitals signs and nursing note reviewed. Constitutional:       General: He is not in acute distress. Appearance: Normal appearance. He is not ill-appearing, toxic-appearing or diaphoretic. HENT:      Head: Normocephalic and atraumatic. Mouth/Throat:      Lips: Pink. Mouth: Mucous membranes are moist. No injury, lacerations, oral lesions or angioedema. Dentition: Abnormal dentition. Does not have dentures. Dental tenderness, gingival swelling and dental caries present. No dental abscesses or gum lesions. Tongue: No lesions. Tongue does not deviate from midline. Palate: No mass and lesions. Pharynx: Oropharynx is clear. Uvula midline. No pharyngeal swelling, oropharyngeal exudate, posterior oropharyngeal erythema or uvula swelling. Tonsils: No tonsillar exudate or tonsillar abscesses. Eyes:      Conjunctiva/sclera: Conjunctivae normal.   Neck:      Musculoskeletal: Normal range of motion and neck supple. No neck rigidity or muscular tenderness. Cardiovascular:      Rate and Rhythm: Normal rate and regular rhythm. Pulses: Normal pulses. Heart sounds: Normal heart sounds. No murmur. Pulmonary:      Effort: Pulmonary effort is normal. No respiratory distress. Breath sounds: Normal breath sounds. No wheezing or rales. Abdominal:      General: Abdomen is flat. Bowel sounds are normal. There is no distension. Palpations: Abdomen is soft. Tenderness: There is no abdominal tenderness. There is no right CVA tenderness or left CVA tenderness. Musculoskeletal: Normal range of motion. Right lower leg: No edema. Left lower leg: No edema. Skin:     General: Skin is warm and dry. Capillary Refill: Capillary refill takes less than 2 seconds. Neurological:      General: No focal deficit present. Mental Status: He is alert and oriented to person, place, and time. Psychiatric:         Behavior: Behavior normal.       Diagnostic Study Results     Labs -  No results found for this or any previous visit (from the past 12 hour(s)). Radiologic Studies -   No orders to display         Medical Decision Making   I am the first provider for this patient. I reviewed the vital signs, available nursing notes, past medical history, past surgical history, family history and social history. Vital Signs-Reviewed the patient's vital signs.     Records Reviewed: Nursing Notes and Old Medical Records (Time of Review: 1:49 PM)    ED Course: Progress Notes, Reevaluation, and Consults:      Provider Notes (Medical Decision Making): This is a 49-year-old male with past medical history significant for hypertension, diabetes, and hepatitis who presented to the ED with chief complaint of right lower molar 10/10 dental pain ongoing x2 days with mild right jaw swelling. No difficulty swallowing. No shortness of breath. No other associated symptoms. His vitals are stable. Pertinent positives on exam as follows: + Abnormal dentition,+ dental tenderness,+ multiple dental caries,+ gingival swelling. No overt evidence of dental abscess. No tongue swelling. Remainder of exam otherwise benign. Discussed management with clindamycin and ibuprofen with patient. Tensive review of return precautions discussed prior to discharge. Discussed importance of dentist and PCP follow-up/reassessment and need to return to ED immediately if any of symptoms worsen. Patient verbalizes understanding of these instructions and is in agreement with discharge plan. Diagnosis     Clinical Impression:   1. Pain, dental        Disposition: home     Follow-up Information     Follow up With Specialties Details Why Contact Info    [address] [place] [address] EMERGENCY DEPT Emergency Medicine  If symptoms worsen [address] [facility]  [phone]    Jaycob Ordoñez MD Family Practice In 2 days dental pain  [address]  In 2 days dental pain  [facility]  [phone]           Discharge Medication List as of 6/13/2020 12:54 PM      START taking these medications    Details   clindamycin (CLEOCIN) 300 mg capsule Take 1 Cap by mouth four (4) times daily for 7 days. , Print, Disp-28 Cap, R-0      ibuprofen (MOTRIN) 600 mg tablet Take 1 Tab by mouth every six (6) hours as needed for Pain for up to 4 days. , Print, Disp-16 Tab, R-0         CONTINUE these medications which have NOT CHANGED    Details   acetaminophen-codeine (TYLENOL-CODEINE #3) 300-30 mg per tablet Take 1 Tab by mouth every four (4) hours as needed for Pain. Max Daily Amount: 6 Tabs., Print, Disp-8 Tab, R-0      PHENobarbital (LUMINAL) 60 mg tablet take 1 tablet by mouth twice a day, Print, Disp-60 Tab, R-0      !! phenytoin ER (DILANTIN EXTENDED) 100 mg ER capsule Take 300 mg by mouth daily. Indications: EPILEPSY, Historical Med      !! phenytoin ER (DILANTIN EXTENDED) 100 mg ER capsule Take 200 mg by mouth nightly. Indications: EPILEPSY, Historical Med      metFORMIN (GLUCOPHAGE) 500 mg tablet Take 500 mg by mouth two (2) times daily (with meals). Indications: TYPE 2 DIABETES MELLITUS, Historical Med      hydrochlorothiazide (HYDRODIURIL) 25 mg tablet Take 25 mg by mouth daily. , Historical Med       !! - Potential duplicate medications found. Please discuss with provider. Dictation disclaimer:  Please note that this dictation was completed with [email or web address], the [employer] voice recognition software. Quite often unanticipated grammatical, syntax, homophones, and other interpretive errors are inadvertently transcribed by the computer software. Please disregard these errors. Please excuse any errors that have escaped final proofreading.

## 2021-04-27 ENCOUNTER — APPOINTMENT (OUTPATIENT)
Dept: GENERAL RADIOLOGY | Age: 61
DRG: 313 | End: 2021-04-27
Attending: EMERGENCY MEDICINE
Payer: MEDICAID

## 2021-04-27 ENCOUNTER — ANESTHESIA (OUTPATIENT)
Dept: SURGERY | Age: 61
DRG: 313 | End: 2021-04-27
Payer: MEDICAID

## 2021-04-27 ENCOUNTER — HOSPITAL ENCOUNTER (INPATIENT)
Age: 61
LOS: 4 days | Discharge: HOME HEALTH | DRG: 313 | End: 2021-05-01
Attending: EMERGENCY MEDICINE | Admitting: INTERNAL MEDICINE
Payer: MEDICAID

## 2021-04-27 ENCOUNTER — APPOINTMENT (OUTPATIENT)
Dept: NON INVASIVE DIAGNOSTICS | Age: 61
DRG: 313 | End: 2021-04-27
Attending: PHYSICIAN ASSISTANT
Payer: MEDICAID

## 2021-04-27 ENCOUNTER — ANESTHESIA EVENT (OUTPATIENT)
Dept: SURGERY | Age: 61
DRG: 313 | End: 2021-04-27
Payer: MEDICAID

## 2021-04-27 ENCOUNTER — APPOINTMENT (OUTPATIENT)
Dept: GENERAL RADIOLOGY | Age: 61
DRG: 313 | End: 2021-04-27
Attending: ORTHOPAEDIC SURGERY
Payer: MEDICAID

## 2021-04-27 DIAGNOSIS — G89.18 POST-OP PAIN: ICD-10-CM

## 2021-04-27 DIAGNOSIS — R56.9 SEIZURE (HCC): Primary | ICD-10-CM

## 2021-04-27 DIAGNOSIS — S82.231A CLOSED DISPLACED OBLIQUE FRACTURE OF SHAFT OF RIGHT TIBIA, INITIAL ENCOUNTER: ICD-10-CM

## 2021-04-27 DIAGNOSIS — S82.221A CLOSED DISPLACED TRANSVERSE FRACTURE OF SHAFT OF RIGHT TIBIA, INITIAL ENCOUNTER: ICD-10-CM

## 2021-04-27 PROBLEM — F10.10 ALCOHOL ABUSE: Status: ACTIVE | Noted: 2021-04-27

## 2021-04-27 PROBLEM — S82.401A CLOSED FRACTURE OF RIGHT TIBIA AND FIBULA: Status: ACTIVE | Noted: 2021-04-27

## 2021-04-27 PROBLEM — E87.1 HYPONATREMIA: Status: ACTIVE | Noted: 2021-04-27

## 2021-04-27 PROBLEM — S82.201A CLOSED FRACTURE OF RIGHT TIBIA AND FIBULA: Status: ACTIVE | Noted: 2021-04-27

## 2021-04-27 LAB
ABO + RH BLD: NORMAL
ALBUMIN SERPL-MCNC: 3.5 G/DL (ref 3.4–5)
ALBUMIN SERPL-MCNC: 4 G/DL (ref 3.4–5)
ALBUMIN/GLOB SERPL: 0.7 {RATIO} (ref 0.8–1.7)
ALBUMIN/GLOB SERPL: 0.7 {RATIO} (ref 0.8–1.7)
ALP SERPL-CCNC: 101 U/L (ref 45–117)
ALP SERPL-CCNC: 87 U/L (ref 45–117)
ALT SERPL-CCNC: 102 U/L (ref 16–61)
ALT SERPL-CCNC: 119 U/L (ref 16–61)
AMPHET UR QL SCN: NEGATIVE
ANION GAP SERPL CALC-SCNC: 10 MMOL/L (ref 3–18)
ANION GAP SERPL CALC-SCNC: 7 MMOL/L (ref 3–18)
APPEARANCE UR: CLEAR
AST SERPL-CCNC: 105 U/L (ref 10–38)
AST SERPL-CCNC: 90 U/L (ref 10–38)
ATRIAL RATE: 75 BPM
BACTERIA URNS QL MICRO: NEGATIVE /HPF
BARBITURATES UR QL SCN: POSITIVE
BASOPHILS # BLD: 0 K/UL (ref 0–0.1)
BASOPHILS # BLD: 0 K/UL (ref 0–0.1)
BASOPHILS NFR BLD: 0 % (ref 0–2)
BASOPHILS NFR BLD: 0 % (ref 0–2)
BENZODIAZ UR QL: NEGATIVE
BILIRUB SERPL-MCNC: 0.6 MG/DL (ref 0.2–1)
BILIRUB SERPL-MCNC: 0.8 MG/DL (ref 0.2–1)
BILIRUB UR QL: NEGATIVE
BLOOD GROUP ANTIBODIES SERPL: NORMAL
BNP SERPL-MCNC: 30 PG/ML (ref 0–900)
BUN SERPL-MCNC: 6 MG/DL (ref 7–18)
BUN SERPL-MCNC: 9 MG/DL (ref 7–18)
BUN/CREAT SERPL: 13 (ref 12–20)
BUN/CREAT SERPL: 8 (ref 12–20)
CALCIUM SERPL-MCNC: 8.8 MG/DL (ref 8.5–10.1)
CALCIUM SERPL-MCNC: 9 MG/DL (ref 8.5–10.1)
CALCULATED P AXIS, ECG09: 41 DEGREES
CALCULATED R AXIS, ECG10: 65 DEGREES
CALCULATED T AXIS, ECG11: 20 DEGREES
CANNABINOIDS UR QL SCN: NEGATIVE
CHLORIDE SERPL-SCNC: 92 MMOL/L (ref 100–111)
CHLORIDE SERPL-SCNC: 93 MMOL/L (ref 100–111)
CO2 SERPL-SCNC: 26 MMOL/L (ref 21–32)
CO2 SERPL-SCNC: 30 MMOL/L (ref 21–32)
COCAINE UR QL SCN: NEGATIVE
COLOR UR: YELLOW
COVID-19 RAPID TEST, COVR: NOT DETECTED
CREAT SERPL-MCNC: 0.71 MG/DL (ref 0.6–1.3)
CREAT SERPL-MCNC: 0.78 MG/DL (ref 0.6–1.3)
DIAGNOSIS, 93000: NORMAL
DIFFERENTIAL METHOD BLD: ABNORMAL
DIFFERENTIAL METHOD BLD: NORMAL
ECHO AO ROOT DIAM: 3.5 CM
ECHO LA AREA 4C: 15.71 CM2
ECHO LA VOL 2C: 30.37 ML (ref 18–58)
ECHO LA VOL 4C: 35.42 ML (ref 18–58)
ECHO LA VOL BP: 35.98 ML (ref 18–58)
ECHO LA VOL/BSA BIPLANE: 18.45 ML/M2 (ref 16–28)
ECHO LA VOLUME INDEX A2C: 15.57 ML/M2 (ref 16–28)
ECHO LA VOLUME INDEX A4C: 18.16 ML/M2 (ref 16–28)
ECHO LV INTERNAL DIMENSION DIASTOLIC: 2.84 CM (ref 4.2–5.9)
ECHO LV INTERNAL DIMENSION SYSTOLIC: 1.69 CM
ECHO LV IVSD: 0.91 CM (ref 0.6–1)
ECHO LV MASS 2D: 71.8 G (ref 88–224)
ECHO LV MASS INDEX 2D: 36.8 G/M2 (ref 49–115)
ECHO LV POSTERIOR WALL DIASTOLIC: 1.02 CM (ref 0.6–1)
ECHO LVOT DIAM: 1.5 CM
ECHO LVOT PEAK GRADIENT: 4.06 MMHG
ECHO LVOT PEAK VELOCITY: 100.71 CM/S
ECHO LVOT SV: 40.8 ML
ECHO LVOT VTI: 23.19 CM
ECHO MV A VELOCITY: 75.19 CM/S
ECHO MV E DECELERATION TIME (DT): 253.55 MS
ECHO MV E VELOCITY: 69.15 CM/S
ECHO MV E/A RATIO: 0.92
EOSINOPHIL # BLD: 0 K/UL (ref 0–0.4)
EOSINOPHIL # BLD: 0.1 K/UL (ref 0–0.4)
EOSINOPHIL NFR BLD: 1 % (ref 0–5)
EOSINOPHIL NFR BLD: 1 % (ref 0–5)
EPITH CASTS URNS QL MICRO: NORMAL /LPF (ref 0–5)
ERYTHROCYTE [DISTWIDTH] IN BLOOD BY AUTOMATED COUNT: 11.6 % (ref 11.6–14.5)
ERYTHROCYTE [DISTWIDTH] IN BLOOD BY AUTOMATED COUNT: 11.7 % (ref 11.6–14.5)
ETHANOL SERPL-MCNC: 9 MG/DL (ref 0–3)
GLOBULIN SER CALC-MCNC: 4.7 G/DL (ref 2–4)
GLOBULIN SER CALC-MCNC: 5.5 G/DL (ref 2–4)
GLUCOSE BLD STRIP.AUTO-MCNC: 107 MG/DL (ref 70–110)
GLUCOSE BLD STRIP.AUTO-MCNC: 147 MG/DL (ref 70–110)
GLUCOSE BLD STRIP.AUTO-MCNC: 74 MG/DL (ref 70–110)
GLUCOSE BLD STRIP.AUTO-MCNC: 86 MG/DL (ref 70–110)
GLUCOSE SERPL-MCNC: 78 MG/DL (ref 74–99)
GLUCOSE SERPL-MCNC: 80 MG/DL (ref 74–99)
GLUCOSE UR STRIP.AUTO-MCNC: NEGATIVE MG/DL
HCT VFR BLD AUTO: 38.8 % (ref 36–48)
HCT VFR BLD AUTO: 43.5 % (ref 36–48)
HDSCOM,HDSCOM: ABNORMAL
HGB BLD-MCNC: 13 G/DL (ref 13–16)
HGB BLD-MCNC: 14.3 G/DL (ref 13–16)
HGB UR QL STRIP: ABNORMAL
INR PPP: 1.1 (ref 0.8–1.2)
KETONES UR QL STRIP.AUTO: NEGATIVE MG/DL
LEUKOCYTE ESTERASE UR QL STRIP.AUTO: NEGATIVE
LVOT MG: 2.47 MMHG
LYMPHOCYTES # BLD: 1.3 K/UL (ref 0.9–3.6)
LYMPHOCYTES # BLD: 2 K/UL (ref 0.9–3.6)
LYMPHOCYTES NFR BLD: 15 % (ref 21–52)
LYMPHOCYTES NFR BLD: 26 % (ref 21–52)
MCH RBC QN AUTO: 31.2 PG (ref 24–34)
MCH RBC QN AUTO: 31.6 PG (ref 24–34)
MCHC RBC AUTO-ENTMCNC: 32.9 G/DL (ref 31–37)
MCHC RBC AUTO-ENTMCNC: 33.5 G/DL (ref 31–37)
MCV RBC AUTO: 94.4 FL (ref 74–97)
MCV RBC AUTO: 95 FL (ref 74–97)
METHADONE UR QL: NEGATIVE
MONOCYTES # BLD: 0.5 K/UL (ref 0.05–1.2)
MONOCYTES # BLD: 0.6 K/UL (ref 0.05–1.2)
MONOCYTES NFR BLD: 6 % (ref 3–10)
MONOCYTES NFR BLD: 8 % (ref 3–10)
NEUTS SEG # BLD: 4.9 K/UL (ref 1.8–8)
NEUTS SEG # BLD: 6.6 K/UL (ref 1.8–8)
NEUTS SEG NFR BLD: 65 % (ref 40–73)
NEUTS SEG NFR BLD: 78 % (ref 40–73)
NITRITE UR QL STRIP.AUTO: NEGATIVE
OPIATES UR QL: POSITIVE
P-R INTERVAL, ECG05: 132 MS
PCP UR QL: NEGATIVE
PH UR STRIP: 5.5 [PH] (ref 5–8)
PLATELET # BLD AUTO: 123 K/UL (ref 135–420)
PLATELET # BLD AUTO: 148 K/UL (ref 135–420)
PMV BLD AUTO: 10.1 FL (ref 9.2–11.8)
PMV BLD AUTO: 10.1 FL (ref 9.2–11.8)
POTASSIUM SERPL-SCNC: 3.2 MMOL/L (ref 3.5–5.5)
POTASSIUM SERPL-SCNC: 3.7 MMOL/L (ref 3.5–5.5)
PROT SERPL-MCNC: 8.2 G/DL (ref 6.4–8.2)
PROT SERPL-MCNC: 9.5 G/DL (ref 6.4–8.2)
PROT UR STRIP-MCNC: NEGATIVE MG/DL
PROTHROMBIN TIME: 14.3 SEC (ref 11.5–15.2)
Q-T INTERVAL, ECG07: 404 MS
QRS DURATION, ECG06: 70 MS
QTC CALCULATION (BEZET), ECG08: 451 MS
RBC # BLD AUTO: 4.11 M/UL (ref 4.35–5.65)
RBC # BLD AUTO: 4.58 M/UL (ref 4.35–5.65)
RBC #/AREA URNS HPF: NORMAL /HPF (ref 0–5)
SARS-COV-2, COV2: NORMAL
SODIUM SERPL-SCNC: 128 MMOL/L (ref 136–145)
SODIUM SERPL-SCNC: 130 MMOL/L (ref 136–145)
SOURCE, COVRS: NORMAL
SP GR UR REFRACTOMETRY: 1.01 (ref 1–1.03)
SPECIMEN EXP DATE BLD: NORMAL
TROPONIN I SERPL-MCNC: <0.02 NG/ML (ref 0–0.04)
UROBILINOGEN UR QL STRIP.AUTO: 0.2 EU/DL (ref 0.2–1)
VENTRICULAR RATE, ECG03: 75 BPM
WBC # BLD AUTO: 7.6 K/UL (ref 4.6–13.2)
WBC # BLD AUTO: 8.5 K/UL (ref 4.6–13.2)
WBC URNS QL MICRO: NEGATIVE /HPF (ref 0–4)

## 2021-04-27 PROCEDURE — 76060000035 HC ANESTHESIA 2 TO 2.5 HR: Performed by: ORTHOPAEDIC SURGERY

## 2021-04-27 PROCEDURE — 27759 TREATMENT OF TIBIA FRACTURE: CPT | Performed by: ORTHOPAEDIC SURGERY

## 2021-04-27 PROCEDURE — 83880 ASSAY OF NATRIURETIC PEPTIDE: CPT

## 2021-04-27 PROCEDURE — 84484 ASSAY OF TROPONIN QUANT: CPT

## 2021-04-27 PROCEDURE — 77030008462 HC STPLR SKN PROX J&J -A: Performed by: ORTHOPAEDIC SURGERY

## 2021-04-27 PROCEDURE — 82077 ASSAY SPEC XCP UR&BREATH IA: CPT

## 2021-04-27 PROCEDURE — 96375 TX/PRO/DX INJ NEW DRUG ADDON: CPT

## 2021-04-27 PROCEDURE — 80053 COMPREHEN METABOLIC PANEL: CPT

## 2021-04-27 PROCEDURE — 74011250636 HC RX REV CODE- 250/636: Performed by: EMERGENCY MEDICINE

## 2021-04-27 PROCEDURE — 2709999900 HC NON-CHARGEABLE SUPPLY

## 2021-04-27 PROCEDURE — 77030016474 HC BIT DRL QC3 SYNT -C: Performed by: ORTHOPAEDIC SURGERY

## 2021-04-27 PROCEDURE — 99223 1ST HOSP IP/OBS HIGH 75: CPT | Performed by: ORTHOPAEDIC SURGERY

## 2021-04-27 PROCEDURE — 80307 DRUG TEST PRSMV CHEM ANLYZR: CPT

## 2021-04-27 PROCEDURE — 74011250636 HC RX REV CODE- 250/636: Performed by: ORTHOPAEDIC SURGERY

## 2021-04-27 PROCEDURE — 85610 PROTHROMBIN TIME: CPT

## 2021-04-27 PROCEDURE — 74011000250 HC RX REV CODE- 250: Performed by: EMERGENCY MEDICINE

## 2021-04-27 PROCEDURE — 2709999900 HC NON-CHARGEABLE SUPPLY: Performed by: ORTHOPAEDIC SURGERY

## 2021-04-27 PROCEDURE — 73590 X-RAY EXAM OF LOWER LEG: CPT

## 2021-04-27 PROCEDURE — 94761 N-INVAS EAR/PLS OXIMETRY MLT: CPT

## 2021-04-27 PROCEDURE — C1713 ANCHOR/SCREW BN/BN,TIS/BN: HCPCS | Performed by: ORTHOPAEDIC SURGERY

## 2021-04-27 PROCEDURE — 85025 COMPLETE CBC W/AUTO DIFF WBC: CPT

## 2021-04-27 PROCEDURE — 01392 ANES OPN PX UPR TIB FIB&/PAT: CPT | Performed by: ANESTHESIOLOGY

## 2021-04-27 PROCEDURE — 74011000250 HC RX REV CODE- 250: Performed by: ORTHOPAEDIC SURGERY

## 2021-04-27 PROCEDURE — 77030027138 HC INCENT SPIROMETER -A

## 2021-04-27 PROCEDURE — 77030008683 HC TU ET CUF COVD -A: Performed by: ANESTHESIOLOGY

## 2021-04-27 PROCEDURE — 77030026438 HC STYL ET INTUB CARD -A: Performed by: ANESTHESIOLOGY

## 2021-04-27 PROCEDURE — 76010000131 HC OR TIME 2 TO 2.5 HR: Performed by: ORTHOPAEDIC SURGERY

## 2021-04-27 PROCEDURE — 74011250636 HC RX REV CODE- 250/636: Performed by: INTERNAL MEDICINE

## 2021-04-27 PROCEDURE — 93005 ELECTROCARDIOGRAM TRACING: CPT

## 2021-04-27 PROCEDURE — 76210000006 HC OR PH I REC 0.5 TO 1 HR: Performed by: ORTHOPAEDIC SURGERY

## 2021-04-27 PROCEDURE — 0QHG06Z INSERTION OF INTRAMEDULLARY INTERNAL FIXATION DEVICE INTO RIGHT TIBIA, OPEN APPROACH: ICD-10-PCS | Performed by: ORTHOPAEDIC SURGERY

## 2021-04-27 PROCEDURE — C9290 INJ, BUPIVACAINE LIPOSOME: HCPCS | Performed by: ORTHOPAEDIC SURGERY

## 2021-04-27 PROCEDURE — 01392 ANES OPN PX UPR TIB FIB&/PAT: CPT | Performed by: NURSE ANESTHETIST, CERTIFIED REGISTERED

## 2021-04-27 PROCEDURE — 75810000283 HC INJECTION NERVE BLOCK

## 2021-04-27 PROCEDURE — 99285 EMERGENCY DEPT VISIT HI MDM: CPT

## 2021-04-27 PROCEDURE — 86901 BLOOD TYPING SEROLOGIC RH(D): CPT

## 2021-04-27 PROCEDURE — 81001 URINALYSIS AUTO W/SCOPE: CPT

## 2021-04-27 PROCEDURE — 71045 X-RAY EXAM CHEST 1 VIEW: CPT

## 2021-04-27 PROCEDURE — 77030000032 HC CUF TRNQT ZIMM -B: Performed by: ORTHOPAEDIC SURGERY

## 2021-04-27 PROCEDURE — 77030031139 HC SUT VCRL2 J&J -A: Performed by: ORTHOPAEDIC SURGERY

## 2021-04-27 PROCEDURE — 74011000250 HC RX REV CODE- 250: Performed by: INTERNAL MEDICINE

## 2021-04-27 PROCEDURE — 96374 THER/PROPH/DIAG INJ IV PUSH: CPT

## 2021-04-27 PROCEDURE — 77030000031 HC BIT DRL QC SYNT -C: Performed by: ORTHOPAEDIC SURGERY

## 2021-04-27 PROCEDURE — 77030012881 HC SHOE PSTOP DERY -A: Performed by: ORTHOPAEDIC SURGERY

## 2021-04-27 PROCEDURE — 65270000029 HC RM PRIVATE

## 2021-04-27 PROCEDURE — 74011000258 HC RX REV CODE- 258: Performed by: INTERNAL MEDICINE

## 2021-04-27 PROCEDURE — C1769 GUIDE WIRE: HCPCS | Performed by: ORTHOPAEDIC SURGERY

## 2021-04-27 PROCEDURE — 93306 TTE W/DOPPLER COMPLETE: CPT

## 2021-04-27 PROCEDURE — 74011250636 HC RX REV CODE- 250/636: Performed by: NURSE ANESTHETIST, CERTIFIED REGISTERED

## 2021-04-27 PROCEDURE — 77030013079 HC BLNKT BAIR HGGR 3M -A: Performed by: ANESTHESIOLOGY

## 2021-04-27 PROCEDURE — 77030014405 HC GD ROD RMR SYNT -C: Performed by: ORTHOPAEDIC SURGERY

## 2021-04-27 PROCEDURE — 74011250637 HC RX REV CODE- 250/637: Performed by: INTERNAL MEDICINE

## 2021-04-27 PROCEDURE — 96376 TX/PRO/DX INJ SAME DRUG ADON: CPT

## 2021-04-27 PROCEDURE — 74011000250 HC RX REV CODE- 250: Performed by: NURSE ANESTHETIST, CERTIFIED REGISTERED

## 2021-04-27 PROCEDURE — 82962 GLUCOSE BLOOD TEST: CPT

## 2021-04-27 PROCEDURE — 87635 SARS-COV-2 COVID-19 AMP PRB: CPT

## 2021-04-27 DEVICE — IMPLANTABLE DEVICE: Type: IMPLANTABLE DEVICE | Site: TIBIA | Status: FUNCTIONAL

## 2021-04-27 DEVICE — SCREW BNE L44MM DIA5MM NONSTERILE TIB LT GRN TI ST CANN LOK: Type: IMPLANTABLE DEVICE | Site: TIBIA | Status: FUNCTIONAL

## 2021-04-27 DEVICE — SCREW BNE L46MM DIA5MM NONSTERILE TIB LT GRN TI ST CANN LOK: Type: IMPLANTABLE DEVICE | Site: TIBIA | Status: FUNCTIONAL

## 2021-04-27 DEVICE — SCREW BNE L42MM DIA5MM NONSTERILE TIB LT GRN TI ST CANN LOK: Type: IMPLANTABLE DEVICE | Site: TIBIA | Status: FUNCTIONAL

## 2021-04-27 RX ORDER — SODIUM CHLORIDE 0.9 % (FLUSH) 0.9 %
5-40 SYRINGE (ML) INJECTION AS NEEDED
Status: DISCONTINUED | OUTPATIENT
Start: 2021-04-27 | End: 2021-05-01 | Stop reason: HOSPADM

## 2021-04-27 RX ORDER — MAGNESIUM SULFATE 100 %
4 CRYSTALS MISCELLANEOUS AS NEEDED
Status: DISCONTINUED | OUTPATIENT
Start: 2021-04-27 | End: 2021-04-27 | Stop reason: HOSPADM

## 2021-04-27 RX ORDER — MAGNESIUM SULFATE 100 %
4 CRYSTALS MISCELLANEOUS AS NEEDED
Status: DISCONTINUED | OUTPATIENT
Start: 2021-04-27 | End: 2021-05-01 | Stop reason: HOSPADM

## 2021-04-27 RX ORDER — ENOXAPARIN SODIUM 100 MG/ML
30 INJECTION SUBCUTANEOUS DAILY
Qty: 28 SYRINGE | Refills: 0 | Status: SHIPPED | OUTPATIENT
Start: 2021-04-27

## 2021-04-27 RX ORDER — NEOSTIGMINE METHYLSULFATE 1 MG/ML
INJECTION, SOLUTION INTRAVENOUS AS NEEDED
Status: DISCONTINUED | OUTPATIENT
Start: 2021-04-27 | End: 2021-04-27 | Stop reason: HOSPADM

## 2021-04-27 RX ORDER — ONDANSETRON 2 MG/ML
4 INJECTION INTRAMUSCULAR; INTRAVENOUS ONCE
Status: DISCONTINUED | OUTPATIENT
Start: 2021-04-27 | End: 2021-04-27 | Stop reason: HOSPADM

## 2021-04-27 RX ORDER — THERA TABS 400 MCG
1 TAB ORAL DAILY
Status: DISCONTINUED | OUTPATIENT
Start: 2021-04-28 | End: 2021-05-01 | Stop reason: HOSPADM

## 2021-04-27 RX ORDER — CEFAZOLIN SODIUM 2 G/50ML
2 SOLUTION INTRAVENOUS EVERY 8 HOURS
Status: COMPLETED | OUTPATIENT
Start: 2021-04-27 | End: 2021-04-28

## 2021-04-27 RX ORDER — IBUPROFEN 200 MG
1 TABLET ORAL DAILY PRN
Status: DISCONTINUED | OUTPATIENT
Start: 2021-04-27 | End: 2021-05-01 | Stop reason: HOSPADM

## 2021-04-27 RX ORDER — EPHEDRINE SULFATE/0.9% NACL/PF 25 MG/5 ML
SYRINGE (ML) INTRAVENOUS AS NEEDED
Status: DISCONTINUED | OUTPATIENT
Start: 2021-04-27 | End: 2021-04-27 | Stop reason: HOSPADM

## 2021-04-27 RX ORDER — LIDOCAINE HYDROCHLORIDE 20 MG/ML
INJECTION, SOLUTION EPIDURAL; INFILTRATION; INTRACAUDAL; PERINEURAL AS NEEDED
Status: DISCONTINUED | OUTPATIENT
Start: 2021-04-27 | End: 2021-04-27 | Stop reason: HOSPADM

## 2021-04-27 RX ORDER — SUCCINYLCHOLINE CHLORIDE 20 MG/ML
INJECTION INTRAMUSCULAR; INTRAVENOUS AS NEEDED
Status: DISCONTINUED | OUTPATIENT
Start: 2021-04-27 | End: 2021-04-27 | Stop reason: HOSPADM

## 2021-04-27 RX ORDER — INSULIN LISPRO 100 [IU]/ML
INJECTION, SOLUTION INTRAVENOUS; SUBCUTANEOUS EVERY 6 HOURS
Status: DISCONTINUED | OUTPATIENT
Start: 2021-04-27 | End: 2021-05-01 | Stop reason: HOSPADM

## 2021-04-27 RX ORDER — SODIUM CHLORIDE 9 MG/ML
INJECTION, SOLUTION INTRAVENOUS
Status: DISCONTINUED | OUTPATIENT
Start: 2021-04-27 | End: 2021-04-27 | Stop reason: HOSPADM

## 2021-04-27 RX ORDER — MORPHINE SULFATE 2 MG/ML
2 INJECTION, SOLUTION INTRAMUSCULAR; INTRAVENOUS
Status: DISCONTINUED | OUTPATIENT
Start: 2021-04-27 | End: 2021-05-01 | Stop reason: HOSPADM

## 2021-04-27 RX ORDER — DEXTROSE 50 % IN WATER (D50W) INTRAVENOUS SYRINGE
25-50 AS NEEDED
Status: DISCONTINUED | OUTPATIENT
Start: 2021-04-27 | End: 2021-05-01 | Stop reason: HOSPADM

## 2021-04-27 RX ORDER — ENOXAPARIN SODIUM 100 MG/ML
40 INJECTION SUBCUTANEOUS DAILY
Status: DISCONTINUED | OUTPATIENT
Start: 2021-04-28 | End: 2021-05-01 | Stop reason: HOSPADM

## 2021-04-27 RX ORDER — LANOLIN ALCOHOL/MO/W.PET/CERES
12 CREAM (GRAM) TOPICAL
Status: DISCONTINUED | OUTPATIENT
Start: 2021-04-27 | End: 2021-05-01 | Stop reason: HOSPADM

## 2021-04-27 RX ORDER — HYDROMORPHONE HYDROCHLORIDE 2 MG/ML
0.5 INJECTION, SOLUTION INTRAMUSCULAR; INTRAVENOUS; SUBCUTANEOUS
Status: DISCONTINUED | OUTPATIENT
Start: 2021-04-27 | End: 2021-04-27 | Stop reason: HOSPADM

## 2021-04-27 RX ORDER — OXYCODONE AND ACETAMINOPHEN 5; 325 MG/1; MG/1
1 TABLET ORAL
Status: DISCONTINUED | OUTPATIENT
Start: 2021-04-27 | End: 2021-04-27

## 2021-04-27 RX ORDER — GLYCOPYRROLATE 0.2 MG/ML
INJECTION INTRAMUSCULAR; INTRAVENOUS AS NEEDED
Status: DISCONTINUED | OUTPATIENT
Start: 2021-04-27 | End: 2021-04-27 | Stop reason: HOSPADM

## 2021-04-27 RX ORDER — INSULIN LISPRO 100 [IU]/ML
INJECTION, SOLUTION INTRAVENOUS; SUBCUTANEOUS ONCE
Status: DISCONTINUED | OUTPATIENT
Start: 2021-04-27 | End: 2021-04-27 | Stop reason: HOSPADM

## 2021-04-27 RX ORDER — SODIUM CHLORIDE 9 MG/ML
50 INJECTION, SOLUTION INTRAVENOUS CONTINUOUS
Status: DISPENSED | OUTPATIENT
Start: 2021-04-27 | End: 2021-04-27

## 2021-04-27 RX ORDER — OXYCODONE HYDROCHLORIDE 5 MG/1
5 TABLET ORAL
Status: DISCONTINUED | OUTPATIENT
Start: 2021-04-27 | End: 2021-04-30

## 2021-04-27 RX ORDER — NALOXONE HYDROCHLORIDE 0.4 MG/ML
0.4 INJECTION, SOLUTION INTRAMUSCULAR; INTRAVENOUS; SUBCUTANEOUS
Status: DISCONTINUED | OUTPATIENT
Start: 2021-04-27 | End: 2021-05-01 | Stop reason: HOSPADM

## 2021-04-27 RX ORDER — HYDROMORPHONE HYDROCHLORIDE 1 MG/ML
1 INJECTION, SOLUTION INTRAMUSCULAR; INTRAVENOUS; SUBCUTANEOUS ONCE
Status: COMPLETED | OUTPATIENT
Start: 2021-04-27 | End: 2021-04-27

## 2021-04-27 RX ORDER — DOCUSATE SODIUM 100 MG/1
100 CAPSULE, LIQUID FILLED ORAL
Status: DISCONTINUED | OUTPATIENT
Start: 2021-04-27 | End: 2021-05-01 | Stop reason: HOSPADM

## 2021-04-27 RX ORDER — DEXTROSE 50 % IN WATER (D50W) INTRAVENOUS SYRINGE
25-50 AS NEEDED
Status: DISCONTINUED | OUTPATIENT
Start: 2021-04-27 | End: 2021-04-27 | Stop reason: HOSPADM

## 2021-04-27 RX ORDER — ONDANSETRON 2 MG/ML
4 INJECTION INTRAMUSCULAR; INTRAVENOUS
Status: DISCONTINUED | OUTPATIENT
Start: 2021-04-27 | End: 2021-05-01 | Stop reason: HOSPADM

## 2021-04-27 RX ORDER — CEFAZOLIN SODIUM 2 G/50ML
2 SOLUTION INTRAVENOUS ONCE
Status: COMPLETED | OUTPATIENT
Start: 2021-04-27 | End: 2021-04-27

## 2021-04-27 RX ORDER — PROPOFOL 10 MG/ML
INJECTION, EMULSION INTRAVENOUS AS NEEDED
Status: DISCONTINUED | OUTPATIENT
Start: 2021-04-27 | End: 2021-04-27 | Stop reason: HOSPADM

## 2021-04-27 RX ORDER — IPRATROPIUM BROMIDE AND ALBUTEROL SULFATE 2.5; .5 MG/3ML; MG/3ML
3 SOLUTION RESPIRATORY (INHALATION)
Status: DISCONTINUED | OUTPATIENT
Start: 2021-04-27 | End: 2021-05-01 | Stop reason: HOSPADM

## 2021-04-27 RX ORDER — ACETAMINOPHEN 325 MG/1
650 TABLET ORAL
Status: DISCONTINUED | OUTPATIENT
Start: 2021-04-27 | End: 2021-05-01 | Stop reason: HOSPADM

## 2021-04-27 RX ORDER — FOLIC ACID 1 MG/1
1 TABLET ORAL DAILY
Status: DISCONTINUED | OUTPATIENT
Start: 2021-04-28 | End: 2021-05-01 | Stop reason: HOSPADM

## 2021-04-27 RX ORDER — PHENOBARBITAL 64.8 MG/1
60 TABLET ORAL 2 TIMES DAILY
Status: DISCONTINUED | OUTPATIENT
Start: 2021-04-27 | End: 2021-05-01 | Stop reason: HOSPADM

## 2021-04-27 RX ORDER — OXYCODONE HYDROCHLORIDE 5 MG/1
10 TABLET ORAL
Status: DISCONTINUED | OUTPATIENT
Start: 2021-04-27 | End: 2021-04-30

## 2021-04-27 RX ORDER — HYDROMORPHONE HYDROCHLORIDE 2 MG/ML
INJECTION, SOLUTION INTRAMUSCULAR; INTRAVENOUS; SUBCUTANEOUS AS NEEDED
Status: DISCONTINUED | OUTPATIENT
Start: 2021-04-27 | End: 2021-04-27 | Stop reason: HOSPADM

## 2021-04-27 RX ORDER — BUPIVACAINE HYDROCHLORIDE 5 MG/ML
10 INJECTION, SOLUTION EPIDURAL; INTRACAUDAL ONCE
Status: COMPLETED | OUTPATIENT
Start: 2021-04-27 | End: 2021-04-27

## 2021-04-27 RX ORDER — LORAZEPAM 1 MG/1
1 TABLET ORAL
Status: DISCONTINUED | OUTPATIENT
Start: 2021-04-27 | End: 2021-05-01 | Stop reason: HOSPADM

## 2021-04-27 RX ORDER — ONDANSETRON 2 MG/ML
INJECTION INTRAMUSCULAR; INTRAVENOUS AS NEEDED
Status: DISCONTINUED | OUTPATIENT
Start: 2021-04-27 | End: 2021-04-27 | Stop reason: HOSPADM

## 2021-04-27 RX ORDER — ROCURONIUM BROMIDE 10 MG/ML
INJECTION, SOLUTION INTRAVENOUS AS NEEDED
Status: DISCONTINUED | OUTPATIENT
Start: 2021-04-27 | End: 2021-04-27 | Stop reason: HOSPADM

## 2021-04-27 RX ORDER — PHENYTOIN SODIUM 100 MG/1
300 CAPSULE, EXTENDED RELEASE ORAL DAILY
Status: DISCONTINUED | OUTPATIENT
Start: 2021-04-27 | End: 2021-05-01 | Stop reason: HOSPADM

## 2021-04-27 RX ORDER — OXYCODONE HYDROCHLORIDE 5 MG/1
5 TABLET ORAL
Qty: 60 TAB | Refills: 0 | Status: SHIPPED | OUTPATIENT
Start: 2021-04-27 | End: 2021-05-07

## 2021-04-27 RX ORDER — SODIUM CHLORIDE 0.9 % (FLUSH) 0.9 %
5-40 SYRINGE (ML) INJECTION EVERY 8 HOURS
Status: DISCONTINUED | OUTPATIENT
Start: 2021-04-27 | End: 2021-05-01 | Stop reason: HOSPADM

## 2021-04-27 RX ORDER — MIDAZOLAM HYDROCHLORIDE 1 MG/ML
INJECTION, SOLUTION INTRAMUSCULAR; INTRAVENOUS AS NEEDED
Status: DISCONTINUED | OUTPATIENT
Start: 2021-04-27 | End: 2021-04-27 | Stop reason: HOSPADM

## 2021-04-27 RX ORDER — LORAZEPAM 2 MG/ML
1 INJECTION INTRAMUSCULAR
Status: DISCONTINUED | OUTPATIENT
Start: 2021-04-27 | End: 2021-05-01 | Stop reason: HOSPADM

## 2021-04-27 RX ORDER — MORPHINE SULFATE 1 MG/ML
2 INJECTION, SOLUTION EPIDURAL; INTRATHECAL; INTRAVENOUS
Status: DISCONTINUED | OUTPATIENT
Start: 2021-04-27 | End: 2021-04-27 | Stop reason: CLARIF

## 2021-04-27 RX ORDER — LANOLIN ALCOHOL/MO/W.PET/CERES
100 CREAM (GRAM) TOPICAL DAILY
Status: DISCONTINUED | OUTPATIENT
Start: 2021-04-28 | End: 2021-05-01 | Stop reason: HOSPADM

## 2021-04-27 RX ORDER — ACETAMINOPHEN 650 MG/1
650 SUPPOSITORY RECTAL
Status: DISCONTINUED | OUTPATIENT
Start: 2021-04-27 | End: 2021-05-01 | Stop reason: HOSPADM

## 2021-04-27 RX ORDER — DEXAMETHASONE SODIUM PHOSPHATE 4 MG/ML
INJECTION, SOLUTION INTRA-ARTICULAR; INTRALESIONAL; INTRAMUSCULAR; INTRAVENOUS; SOFT TISSUE AS NEEDED
Status: DISCONTINUED | OUTPATIENT
Start: 2021-04-27 | End: 2021-04-27 | Stop reason: HOSPADM

## 2021-04-27 RX ORDER — PHENYLEPHRINE HCL IN 0.9% NACL 1 MG/10 ML
SYRINGE (ML) INTRAVENOUS AS NEEDED
Status: DISCONTINUED | OUTPATIENT
Start: 2021-04-27 | End: 2021-04-27 | Stop reason: HOSPADM

## 2021-04-27 RX ORDER — LORAZEPAM 1 MG/1
2 TABLET ORAL
Status: DISCONTINUED | OUTPATIENT
Start: 2021-04-27 | End: 2021-05-01 | Stop reason: HOSPADM

## 2021-04-27 RX ORDER — LORAZEPAM 2 MG/ML
2 INJECTION INTRAMUSCULAR
Status: DISCONTINUED | OUTPATIENT
Start: 2021-04-27 | End: 2021-05-01 | Stop reason: HOSPADM

## 2021-04-27 RX ORDER — ENOXAPARIN SODIUM 100 MG/ML
30 INJECTION SUBCUTANEOUS DAILY
Status: DISCONTINUED | OUTPATIENT
Start: 2021-04-28 | End: 2021-04-27

## 2021-04-27 RX ORDER — PHENYTOIN SODIUM 100 MG/1
200 CAPSULE, EXTENDED RELEASE ORAL
Status: DISCONTINUED | OUTPATIENT
Start: 2021-04-27 | End: 2021-05-01 | Stop reason: HOSPADM

## 2021-04-27 RX ORDER — MORPHINE SULFATE 4 MG/ML
4 INJECTION INTRAVENOUS
Status: DISCONTINUED | OUTPATIENT
Start: 2021-04-27 | End: 2021-04-28 | Stop reason: SDUPTHER

## 2021-04-27 RX ORDER — HYDROMORPHONE HYDROCHLORIDE 2 MG/ML
2 INJECTION, SOLUTION INTRAMUSCULAR; INTRAVENOUS; SUBCUTANEOUS
Status: COMPLETED | OUTPATIENT
Start: 2021-04-27 | End: 2021-04-27

## 2021-04-27 RX ORDER — LORAZEPAM 2 MG/ML
3 INJECTION INTRAMUSCULAR
Status: DISCONTINUED | OUTPATIENT
Start: 2021-04-27 | End: 2021-05-01 | Stop reason: HOSPADM

## 2021-04-27 RX ORDER — MORPHINE SULFATE 2 MG/ML
2 INJECTION, SOLUTION INTRAMUSCULAR; INTRAVENOUS
Status: DISCONTINUED | OUTPATIENT
Start: 2021-04-27 | End: 2021-04-27 | Stop reason: SDUPTHER

## 2021-04-27 RX ORDER — FENTANYL CITRATE 50 UG/ML
INJECTION, SOLUTION INTRAMUSCULAR; INTRAVENOUS AS NEEDED
Status: DISCONTINUED | OUTPATIENT
Start: 2021-04-27 | End: 2021-04-27 | Stop reason: HOSPADM

## 2021-04-27 RX ADMIN — PROPOFOL 100 MG: 10 INJECTION, EMULSION INTRAVENOUS at 16:06

## 2021-04-27 RX ADMIN — BUPIVACAINE HYDROCHLORIDE 50 MG: 5 INJECTION, SOLUTION EPIDURAL; INTRACAUDAL; PERINEURAL at 06:00

## 2021-04-27 RX ADMIN — Medication 10 ML: at 12:13

## 2021-04-27 RX ADMIN — CEFAZOLIN SODIUM 2 G: 2 SOLUTION INTRAVENOUS at 22:21

## 2021-04-27 RX ADMIN — ONDANSETRON 4 MG: 2 INJECTION INTRAMUSCULAR; INTRAVENOUS at 17:22

## 2021-04-27 RX ADMIN — SODIUM CHLORIDE: 900 INJECTION, SOLUTION INTRAVENOUS at 17:22

## 2021-04-27 RX ADMIN — GLYCOPYRROLATE 0.4 MG: 0.2 INJECTION INTRAMUSCULAR; INTRAVENOUS at 17:40

## 2021-04-27 RX ADMIN — Medication 50 MCG: at 16:49

## 2021-04-27 RX ADMIN — SUCCINYLCHOLINE CHLORIDE 120 MG: 20 INJECTION, SOLUTION INTRAMUSCULAR; INTRAVENOUS at 16:06

## 2021-04-27 RX ADMIN — HYDROMORPHONE HYDROCHLORIDE 0.5 MG: 2 INJECTION, SOLUTION INTRAMUSCULAR; INTRAVENOUS; SUBCUTANEOUS at 17:32

## 2021-04-27 RX ADMIN — HYDROMORPHONE HYDROCHLORIDE 1 MG: 1 INJECTION, SOLUTION INTRAMUSCULAR; INTRAVENOUS; SUBCUTANEOUS at 04:19

## 2021-04-27 RX ADMIN — LIDOCAINE HYDROCHLORIDE 60 MG: 20 INJECTION, SOLUTION EPIDURAL; INFILTRATION; INTRACAUDAL; PERINEURAL at 16:06

## 2021-04-27 RX ADMIN — DEXAMETHASONE SODIUM PHOSPHATE 4 MG: 4 INJECTION, SOLUTION INTRAMUSCULAR; INTRAVENOUS at 16:18

## 2021-04-27 RX ADMIN — PHENYTOIN SODIUM 200 MG: 100 CAPSULE ORAL at 22:21

## 2021-04-27 RX ADMIN — Medication 3 MG: at 17:40

## 2021-04-27 RX ADMIN — FOLIC ACID: 5 INJECTION, SOLUTION INTRAMUSCULAR; INTRAVENOUS; SUBCUTANEOUS at 19:39

## 2021-04-27 RX ADMIN — ROCURONIUM BROMIDE 10 MG: 50 INJECTION INTRAVENOUS at 16:31

## 2021-04-27 RX ADMIN — MIDAZOLAM HYDROCHLORIDE 2 MG: 2 INJECTION, SOLUTION INTRAMUSCULAR; INTRAVENOUS at 16:00

## 2021-04-27 RX ADMIN — SODIUM CHLORIDE 50 ML/HR: 900 INJECTION, SOLUTION INTRAVENOUS at 09:36

## 2021-04-27 RX ADMIN — PHENOBARBITAL 64.8 MG: 64.8 TABLET ORAL at 12:11

## 2021-04-27 RX ADMIN — HYDROMORPHONE HYDROCHLORIDE 2 MG: 2 INJECTION, SOLUTION INTRAMUSCULAR; INTRAVENOUS; SUBCUTANEOUS at 02:08

## 2021-04-27 RX ADMIN — ROCURONIUM BROMIDE 40 MG: 50 INJECTION INTRAVENOUS at 16:14

## 2021-04-27 RX ADMIN — FENTANYL CITRATE 50 MCG: 50 INJECTION, SOLUTION INTRAMUSCULAR; INTRAVENOUS at 16:22

## 2021-04-27 RX ADMIN — Medication 30 ML: at 22:21

## 2021-04-27 RX ADMIN — PHENOBARBITAL 64.8 MG: 64.8 TABLET ORAL at 19:39

## 2021-04-27 RX ADMIN — FENTANYL CITRATE 50 MCG: 50 INJECTION, SOLUTION INTRAMUSCULAR; INTRAVENOUS at 16:05

## 2021-04-27 RX ADMIN — Medication 5 MG: at 16:36

## 2021-04-27 RX ADMIN — Medication 10 ML: at 22:22

## 2021-04-27 RX ADMIN — Medication 5 MG: at 16:25

## 2021-04-27 RX ADMIN — ROCURONIUM BROMIDE 10 MG: 50 INJECTION INTRAVENOUS at 16:43

## 2021-04-27 RX ADMIN — FENTANYL CITRATE 50 MCG: 50 INJECTION, SOLUTION INTRAMUSCULAR; INTRAVENOUS at 16:43

## 2021-04-27 RX ADMIN — FENTANYL CITRATE 50 MCG: 50 INJECTION, SOLUTION INTRAMUSCULAR; INTRAVENOUS at 16:57

## 2021-04-27 RX ADMIN — PHENYTOIN SODIUM 300 MG: 100 CAPSULE ORAL at 09:35

## 2021-04-27 RX ADMIN — THIAMINE HYDROCHLORIDE 100 MG: 100 INJECTION, SOLUTION INTRAMUSCULAR; INTRAVENOUS at 19:39

## 2021-04-27 RX ADMIN — CEFAZOLIN SODIUM 2 G: 2 SOLUTION INTRAVENOUS at 16:15

## 2021-04-27 RX ADMIN — SODIUM CHLORIDE: 900 INJECTION, SOLUTION INTRAVENOUS at 16:00

## 2021-04-27 RX ADMIN — OXYCODONE HYDROCHLORIDE AND ACETAMINOPHEN 1 TABLET: 5; 325 TABLET ORAL at 11:09

## 2021-04-27 NOTE — ED NOTES
Assume care of patient, patient alert and oriented x 4, skin warm and dry, patient with splint on, good pedal pulse, cap refill less than 5 sec, POC discussed with patient, patient plans for surgery, VSS

## 2021-04-27 NOTE — ANESTHESIA PREPROCEDURE EVALUATION
Relevant Problems   NEUROLOGY   (+) Alcohol abuse       Anesthetic History   No history of anesthetic complications            Review of Systems / Medical History  Patient summary reviewed and pertinent labs reviewed    Pulmonary          Smoker         Neuro/Psych     seizures         Cardiovascular    Hypertension                   GI/Hepatic/Renal           Liver disease    Comments: ETOH abuse Endo/Other    Diabetes: type 2         Other Findings              Physical Exam    Airway  Mallampati: III  TM Distance: 4 - 6 cm  Neck ROM: decreased range of motion   Mouth opening: Diminished (comment)     Cardiovascular    Rhythm: regular  Rate: normal         Dental    Dentition: Poor dentition and Caps/crowns     Pulmonary  Breath sounds clear to auscultation               Abdominal  GI exam deferred       Other Findings            Anesthetic Plan    ASA: 2  Anesthesia type: general          Induction: Intravenous  Anesthetic plan and risks discussed with: Patient

## 2021-04-27 NOTE — ED NOTES
TRANSFER - ED to INPATIENT REPORT:    Verbal report given to Wale domínguez RN(name) on Leobardo Zambrano  being transferred to [facility] [phone] 5S(unit) for routine progression of care       Report consisted of patients Situation, Background, Assessment and   Recommendations(SBAR). SBAR report made available to receiving floor on this patient being transferred to 5S  for routine progression of care       Admitting diagnosis Closed fracture of right tibia and fibula [S82.201A, S82.401A]    Information from the following report(s) ED Summary, Intake/Output and MAR was made available to receiving floor. Lines:   Peripheral IV 04/27/21 Right Antecubital (Active)        HCG Status for ALL Females under 53 y/o: NO     Medication list unable to confirm    Opportunity for questions and clarification was provided.       Patient is oriented to time, place, person and situation   Patient is  continent and ambulatory without assist     Valuables transported with patient     Patient transported with:   Transport    MAP (Monitor): 84 =Monitored (most recent)  Vitals w/ MEWS Score (last day)     Date/Time MEWS Score Pulse Resp Temp BP Level of Consciousness SpO2    04/27/21 1000  1  76  18  98.2 °F (36.8 °C)  137/81  Alert (0)  100 %    04/27/21 0945    72  15    118/75    99 %    04/27/21 0930    74  14    114/75    98 %    04/27/21 0915    74  15    112/74    99 %    04/27/21 0900    76  15    102/70    98 %    04/27/21 0845    76  14    108/79    95 %    04/27/21 0830    76  14    115/79    98 %    04/27/21 0815    81  19    123/85        04/27/21 0800    81  13    135/86    98 %    04/27/21 0745    75  12    114/84    93 %    04/27/21 0730    71  13    114/76    95 %    04/27/21 0715    71  14    114/78    94 %    04/27/21 0700    74  12    116/78    93 %    04/27/21 0645    76  15    117/81    97 %    04/27/21 0630    77  15    120/89    96 %    04/27/21 0530    71  12    113/79    95 % 04/27/21 0445    68  12    109/72    99 %    04/27/21 0400    70  15    116/81    96 %    04/27/21 0300    70  10    103/65    97 %    04/27/21 0215    78  13    107/68    93 %    04/27/21 0115  2  78  22  98 °F (36.7 °C)  111/77  Alert (0)  97 %              Septic Patients:     Lactic Acid  No results found for: LACPOC (Most recent on top)  Repeat drawn: NA Time: NA     ALL LACTIC ACIDS GREATER THAN 2 MUST BE REPEATED POC WITHIN 4 HOURS OR PRIOR TO ADMISSION               Total Fluid Bolus initiated and documented on MAR: NA    All ordered antibiotics initiated within first 3 hours of TIME ZERO?   NA  Nurse made aware that all medications that are scheduled are not given due to not being in ER

## 2021-04-27 NOTE — ROUTINE PROCESS
TRANSFER - IN REPORT:    Verbal report received from Marce (name) on Linda Stoddard  being received from PACU (unit) for routine post - op      Report consisted of patients Situation, Background, Assessment and   Recommendations(SBAR). Information from the following report(s) SBAR, Kardex, [facility] and Recent Results was reviewed with the receiving nurse. Opportunity for questions and clarification was provided. Assessment completed upon patients arrival to unit and care assumed. Bedside shift change report given to Livingston Favre (oncoming nurse) by Pepe Beard (offgoing nurse). Report included the following information SBAR, Kardex, Intake/Output, MAR and Recent Results.

## 2021-04-27 NOTE — BRIEF OP NOTE
Brief Postoperative Note    Patient: Leobardo Zambrano  YOB: 1960  MRN: [id number]    Date of Procedure: 4/27/2021     Pre-Op Diagnosis: Right Tibia Fracture, right fibula fracture    Post-Op Diagnosis: Same as preoperative diagnosis. Procedure(s):  RIGHT TIBIA INSERTION INTRA MEDULLARY NAIL/ SYNTHES/ FLAT MOHAN TABLE/ TIBIA TRIANGLE    Surgeon(s):  Maria Del Carmen Upton MD    Surgical Assistant: Surg Asst-1: Aminta QUINTERO    Anesthesia: General     Estimated Blood Loss (mL): 538    Complications: None    Specimens: * No specimens in log *     Implants:   Implant Name Type Inv.  Item Serial No.  Lot No. LRB No. Used Action   NAIL IM [id number] [id number] UNIV TIB LT GRN TI [employer] KITA FLUT - [id number]  NAIL IM L405MM [id number] UNIV TIB LT GRN TI KAM KITA FLUT  DEPUY SYNTHES [employer] [id number] Right 1 Implanted   SCREW BNE L46MM DIA5MM NONSTERILE TIB LT GRN TI ST [employer] KITA - [id number]  SCREW BNE L46MM DIA5MM NONSTERILE TIB LT GRN TI ST KAM KITA  DEPUY SYNTHES [employer] N/A Right 1 Implanted   SCREW BNE L42MM DIA5MM NONSTERILE TIB LT GRN TI ST [employer] KITA - [id number]  SCREW BNE L42MM DIA5MM NONSTERILE TIB LT GRN TI ST KAM KITA  DEPUY SYNTHES [employer] N/A Right 1 Implanted   SCREW BNE L65MM DIA5MM ROMIAN G TI ST KITA FULL THRD T25 - [id number]  SCREW BNE L65MM DIA5MM ROMINA G TI ST KITA FULL THRD T25  DEPUY SYNTHES [employer] N/A Right 1 Implanted   SCREW BNE L44MM DIA5MM NONSTERILE TIB LT GRN TI ST [employer] KITA - [id number]  SCREW BNE L44MM DIA5MM NONSTERILE TIB LT GRN TI ST [employer] KITA  DEPUY SYNTHES [employer] N/A Right 1 Implanted   ENDCAP ORTH EXTN 5MM TIB G TI KAM T40 [employer] RECESS FOR - [id number]  ENDCAP ORTH EXTN 5MM TIB G TI KAM T40 [employer]AR[employer] RECESS FOR  DEPUY SYNTHES [employer] N/A Right 1 Implanted       Drains: * No LDAs found *    Findings: Right tibia midshaft displaced fracture, right fibula proximal displaced fracture    Electronically Signed by Khalida Cm MD on 4/27/2021 at 6:10 PM

## 2021-04-27 NOTE — PERIOP NOTES
TRANSFER - OUT REPORT:    Verbal report given to ANMOL Carver on Frances Doss  being transferred to (unit) for routine post - op       Report consisted of patients Situation, Background, Assessment and   Recommendations(SBAR). Information from the following report(s) SBAR, Kardex, Procedure Summary, Intake/Output, MAR and Recent Results was reviewed with the receiving nurse. Lines:   Peripheral IV 04/27/21 Right Antecubital (Active)   Site Assessment Clean, dry, & intact 04/27/21 1809   Phlebitis Assessment 0 04/27/21 1809   Infiltration Assessment 0 04/27/21 1809   Dressing Status Clean, dry, & intact 04/27/21 1809   Dressing Type Tape;Transparent 04/27/21 1809   Hub Color/Line Status Pink; Infusing 04/27/21 1809   Alcohol Cap Used Yes 04/27/21 1530      Visit Vitals  BP (!) 147/92   Pulse 72   Temp 97.5 °F (36.4 °C)   Resp 18   Ht 6' 2\" (1.88 m)   Wt 70.3 kg (155 lb)   SpO2 100%   BMI 19.90 kg/m²       Opportunity for questions and clarification was provided.       Patient transported with:   [employer]

## 2021-04-27 NOTE — PERIOP NOTES
Tried contacting this patient's wife listed under patient's contact. Apparently, spouse no longer owns that number.  Requests number to be taken off under this pt's contact info

## 2021-04-27 NOTE — ED TRIAGE NOTES
Patient arrived via medic from home after falling down 3 steps and injuring right lower leg. Deformity noted to leg.

## 2021-04-27 NOTE — CONSULTS
[facility] - Consult Note    Consultation request by Oliver Ronquillo DO for advice/opinion related to evaluating Closed fracture of right tibia and fibula [S82.201A, S82.401A]    Date of  Admission: 4/27/2021 12:57 AM   Primary Care Physician:  Manuela Priest MD     Assessment:     -Closed fracture of right tibia and fibula following mechanical fall.  -Abnormal ECG with nonspecific ST wave abnormalities. No cardiac complaints.  -Hypertension. Stable. On HCTZ as outpatient.  -Diabetes mellitus. On Metformin as outpatient.  -Hyponatremia.  -Hypokalemia.  -Seizure disorder. -ETOH abuse.  -Tobacco abuse. No primary cardiologist.     Plan: Will review echocardiogram once complete but patient gives no symptoms to suggest recent unstable angina or decompensated heart failure. Continue to follow electrolytes and replace as needed. Lifestyle changes encouraged. Staff addendum:    Asked to see for preop repair of tib/fib fracture, abnormal EKG with nonspecific changes. Although patient with multiple co-morbidities and risk for CAD, reasonable functional status and no evidence ongoing ischemia, CHF, or arrhythmias. Plan to followup echo for completeness. I saw, examined, and evaluated the patient. I personally reviewed the patient's labs, tests, vitals, orders, medications, updated history, and other providers assessments. I personally agree with the findings as stated and the plan as documented. Zohra Manrique MD       History of Present Illness: This is a 59 y.o. male admitted for Closed fracture of right tibia and fibula [S82.201A, S82.401A]. Patient complains of:  Fall. Patient reports he fell down the stairs. Patient is admitted with a closed fracture of right tibia and fibula. Cardiology is asked to comment on abnormal ECG. Patient reports fall was mechanical. Patient denies any previous cardiac work up or history.  Patient denies any CP, SOB, palp, syncope, near syncope, orthopnea, PND, LOUIS. Cardiac risk factors: smoking/ tobacco exposure, diabetes mellitus, male gender, hypertension      Review of Symptoms:  Except as stated above include:  Constitutional:  negative  Respiratory:  negative  Cardiovascular:  Denies CP, palp, syncope, sob, orthopnea, LOUIS. Gastrointestinal: negative  Genitourinary:  negative  Musculoskeletal:  Negative  Neurological:  Negative  Dermatological:  Negative  Endocrinological: Negative  Psychological:  Negative       Past Medical History:     Past Medical History:   Diagnosis Date    Diabetes ([facility]     ETOH abuse     Hepatitis C     Hypertension     Seizures ([facility]     last seizure few weeks ago (12/8/14), gets them periodically         Social History:     Social History     Socioeconomic History    Marital status:      Spouse name: Not on file    Number of children: Not on file    Years of education: Not on file    Highest education level: Not on file   Tobacco Use    Smoking status: Current Every Day Smoker     Packs/day: 0.50     Years: 44.00     Pack years: 22.00    Smokeless tobacco: Never Used   Substance and Sexual Activity    Alcohol use: Yes     Alcohol/week: 56.0 standard drinks     Types: 56 Cans of beer per week     Comment: 8 Beers/night    Drug use: No   Other Topics Concern        Family History:   History reviewed. No pertinent family history.      Medications:   No Known Allergies     Current Facility-Administered Medications   Medication Dose Route Frequency    sodium chloride (NS) flush 5-40 mL  5-40 mL IntraVENous Q8H    sodium chloride (NS) flush 5-40 mL  5-40 mL IntraVENous PRN    acetaminophen (TYLENOL) tablet 650 mg  650 mg Oral Q6H PRN    Or    acetaminophen (TYLENOL) suppository 650 mg  650 mg Rectal Q6H PRN    insulin lispro (HUMALOG) injection   SubCUTAneous Q6H    glucose chewable tablet 16 g  4 Tab Oral PRN    glucagon (GLUCAGEN) injection 1 mg  1 mg IntraMUSCular PRN    dextrose (D50W) injection syrg 12.5-25 g  25-50 mL IntraVENous PRN    ondansetron (ZOFRAN) injection 4 mg  4 mg IntraVENous Q4H PRN    naloxone (NARCAN) injection 0.4 mg  0.4 mg IntraVENous EVERY 2 MINUTES AS NEEDED    oxyCODONE-acetaminophen (PERCOCET) 5-325 mg per tablet 1 Tab  1 Tab Oral Q4H PRN    morphine injection 4 mg  4 mg IntraVENous Q3H PRN    albuterol-ipratropium (DUO-NEB) 2.5 MG-0.5 MG/3 ML  3 mL Nebulization Q4H PRN    melatonin tablet 12 mg  12 mg Oral QHS PRN    docusate sodium (COLACE) capsule 100 mg  100 mg Oral BID PRN    0.9% sodium chloride infusion  50 mL/hr IntraVENous CONTINUOUS    nicotine (NICODERM CQ) 14 mg/24 hr patch 1 Patch  1 Patch TransDERmal DAILY PRN    PHENobarbitaL (LUMINAL) tablet 64.8 mg  64.8 mg Oral BID    phenytoin ER (DILANTIN ER) ER capsule 300 mg  300 mg Oral DAILY    phenytoin ER (DILANTIN ER) ER capsule 200 mg  200 mg Oral QHS    folic acid (FOLVITE) 1 mg in 0.9% sodium chloride 50 mL ivpb   IntraVENous ONCE    thiamine (B-1) 100 mg in 0.9% sodium chloride 50 mL IVPB  100 mg IntraVENous ONCE    [START ON 3/55/2957] folic acid (FOLVITE) tablet 1 mg  1 mg Oral DAILY    [START ON 4/28/2021] thiamine HCL (B-1) tablet 100 mg  100 mg Oral DAILY    [START ON 4/28/2021] therapeutic multivitamin (THERAGRAN) tablet 1 Tab  1 Tab Oral DAILY    sodium chloride (NS) flush 5-40 mL  5-40 mL IntraVENous Q8H    sodium chloride (NS) flush 5-40 mL  5-40 mL IntraVENous PRN    LORazepam (ATIVAN) tablet 1 mg  1 mg Oral Q1H PRN    Or    LORazepam (ATIVAN) injection 1 mg  1 mg IntraVENous Q1H PRN    LORazepam (ATIVAN) tablet 2 mg  2 mg Oral Q1H PRN    Or    LORazepam (ATIVAN) injection 2 mg  2 mg IntraVENous Q1H PRN    LORazepam (ATIVAN) injection 3 mg  3 mg IntraVENous Q15MIN PRN         Physical Exam:     Visit Vitals  BP (!) 138/92   Pulse 77   Temp 98.2 °F (36.8 °C)   Resp 18   SpO2 97%     BP Readings from Last 3 Encounters:   04/27/21 (!) 138/92   06/13/20 (!) 123/91   05/15/20 122/83     Pulse Readings from Last 3 Encounters:   04/27/21 77   06/13/20 82   05/15/20 72     Wt Readings from Last 3 Encounters:   02/22/20 155 lb (70.3 kg)   11/28/17 155 lb (70.3 kg)   12/26/14 156 lb (70.8 kg)       General:  alert, cooperative, no distress, appears stated age  Neck:  no JVD  Lungs:  clear to auscultation bilaterally  Heart:  regular rate and rhythm  Abdomen:  abdomen is soft without significant tenderness, masses, organomegaly or guarding  Extremities:  extremities normal, atraumatic, no cyanosis or edema  Skin: Warm and dry.  no hyperpigmentation, vitiligo, or suspicious lesions  Neuro: alert, oriented x3, affect appropriate  Psych: non focal     Data Review:     Recent Labs     04/27/21  0914 04/27/21  0123   WBC 8.5 7.6   HGB 13.0 14.3   HCT 38.8 43.5   * 148     Recent Labs     04/27/21  0914 04/27/21  0123   * 128*   K 3.7 3.2*   CL 93* 92*   CO2 30 26   GLU 78 80   BUN 9 6*   CREA 0.71 0.78   CA 8.8 9.0   ALB 3.5 4.0   * 119*   INR 1.1  --        Results for orders placed or performed during the hospital encounter of 08/06/14   EKG, 12 LEAD, INITIAL   Result Value Ref Range    Ventricular Rate 56 BPM    Atrial Rate 56 BPM    P-R Interval 128 ms    QRS Duration 80 ms    Q-T Interval 416 ms    QTC Calculation (Bezet) 401 ms    Calculated P Axis 67 degrees    Calculated R Axis 69 degrees    Calculated T Axis 32 degrees    Diagnosis       Sinus bradycardia  SHORT IL INTERVAL  Otherwise normal ECG  When compared with ECG of 04-AUG-2012 10:40,  No significant change was found  Confirmed by Barbie Abdalla MD, -- ([id number]) on 8/6/2014 7:39:06 PM       All Cardiac Markers in the last 24 hours:    Lab Results   Component Value Date/Time    TROIQ <0.02 04/27/2021 09:14 AM       Last Lipid:  No results found for: CHOL, CHOLX, CHLST, CHOLV, HDL, HDLP, LDL, LDLC, DLDLP, TGLX, TRIGL, TRIGP, CHHD, CHHDX    Signed By: LEXUS Muse     April 27, 2021

## 2021-04-27 NOTE — OP NOTES
[facility]  OPERATIVE REPORT     Patient Name: Amelie Roberson  Medical Record Number:  [id number]  YOB: 1960  ACCOUNT #:  [de-identified]  DATE OF SERVICE: 4/27/2021     PREOPERATIVE DIAGNOSES:  1. Right tibia midshaft displaced fracture  2. Right fibula proximal displaced fracture     POSTOPERATIVE DIAGNOSES:  1. Right tibia midshaft displaced fracture  2. Right fibula proximal displaced fracture     PROCEDURES PERFORMED:  1. Right tibia intramedullary nailing     SURGEON:  Maria Guadalupe Cai MD     ASSISTANT:   MAXIMO Knight PA-C was medically necessary for the procedure. She assisted in patient positioning, retraction, fracture reduction, placement of hardware, wound closure, placement of dressing and splint, and transfer the patient to PACU. ANESTHESIA:   General with Exparel local     COMPLICATIONS:   None     SPECIMENS REMOVED:  None. IMPLANTS:   Synthes tibial nail 405 mm x 11 mm with 5 mm locking end cap and 3 proximal screws and 2 distal locking screws     ESTIMATED BLOOD LOSS:   150 cc     IV FLUIDS:   1200 cc LR     INDICATIONS FOR PROCEDURE:   Amelie Roberson is a 49-year-old male who presented to the emergency room after a fall at home. He fell down the stairs. In the emergency room he was found to have a displaced midshaft tibia fracture with a displaced proximal fibula fracture. This was splinted in the ER. After clearance by medicine and discussing the risks benefits and alternatives to surgery he elected to undergo the procedure as described. PROCEDURE:   The patient was identified in the preoperative holding area. The right leg was marked as the operative extremity after confirmation with the patient. After discussing the risk and benefits of the procedure informed consent was obtained. The patient was then taken to the operating room.   He was anesthetized and intubated on his hospital bed and then moved to the Jd table. C-arm fluoroscopy was used throughout the procedure to verify fracture reduction and placement of hardware. A timeout was performed verifying the correct patient procedure and operative extremity with all in agreement. Antibiotics were given through the IV prior to skin incision. The right leg was prepped and draped in normal sterile fashion. Under C arm fluoroscopy the fracture was provisionally reduced closed with traction internal and external rotation and varus and valgus force. Once it was determined that the fracture was able to be reduced closed an incision was made over the patellar tendon from the inferior pole of the patella to the tibial tubercle. Skin and subcutaneous tissue were dissected down to the level of the peritenon. The peritenon was then incised in line with the skin incision. The patellar tendon was retracted laterally. A guidepin was placed in the proximal tibia and advanced into the proximal tibia shaft. The placement of the guidepin was confirmed under fluoroscopy. Over the guidepin was reamed opening the tibial shaft. A slightly bent ball-tipped guidewire was then advanced into the tibial shaft. It was malleted down to the level of the fracture site. With the fracture held reduced the guidepin was advanced into the distal fragment and confirmed to be in the distal tibia fracture fragment on AP and lateral fluoroscopy. It was then advanced to the physeal scar in the center center position of the distal tibia at the ankle. This was measured for size 405 mm nail. Over the guidewire was sequentially reamed up to a size 12-1/2 mm reamer which had good cortical chatter around and at the fracture site. The size 405 mm nail was then impacted into place over the guidewire. An anatomic reduction was obtained with passage of the nail past the fracture. The guidewire was then removed.   Proximally 2 medial to lateral screws were placed through a stab incision locking the nail proximally. An additional anterior medial to posterior lateral oblique screw was placed using the targeting guide for further fixation due to the extension of the fracture proximally. Attention was then turned distally. Using the perfect [place] technique 2 medial to lateral distal interlocking screws were drilled and then placed by hand. Proximally a 5 mm end cap was placed to lock the oblique screw into position. Final fluoroscopy pictures were obtained proximally distally and at the fracture site. The wound sites were copiously irrigated with normal saline. The peritenon was reapproximated with 0 Vicryl suture. The skin of all the incisions was closed with 2-0 Vicryl suture and staples. A mixture of Exparel and Marcaine anesthetic was injected into each incision. Sterile dressings were placed over the incisions. A short leg splint was then placed after the drapes were taken down. The patient was then awakened from anesthesia, extubated, and taken to PACU in stable condition with a plan for readmission to the hospitalist service. POSTOPERATIVE PLAN: The patient will be readmitted to the hospitalist service. He will receive 24 hours of perioperative antibiotics. He will be started on Lovenox 40 mg daily on postoperative day 1 for DVT prophylaxis for 4 weeks. He is to be toe-touch weightbearing working with physical therapy and Occupational Therapy. Pending his progress with physical therapy and Occupational Therapy he will be discharged per their recommendations. On postoperative day 2 or 3 if still in the hospital we will likely switch him over to a walking boot after a wound check.      Electronically Signed Up   Cosme Fu MD  04/27/21  6:12 PM

## 2021-04-27 NOTE — H&P
History and Physical    Patient: Cindy Chacon MRN: [id number]  SSN: [id number]    YOB: 1960  Age: 59 y.o. Sex: male      Subjective:      Cindy Chacon is a 59 y.o. -American male who presents to [facility] ER with complaint of Fall. Patient reports that he was drunk to the point that he cannot remember what happened and then fell down 3 steps. Patient reports that he drinks approximately 8 beers/night and continues to Smoke. Patient complains of RLE Pain, but is otherwise without significant complaint. In [facility] ER, Patient is noted to have Na+ 128, , and Alk Phos 105. Patient is admitted to [facility] Surgical Unit for management of Right Tibia Fracture and Right Fibular Fracture 2°/2 Fall. Suspect Acute Alcohol Intoxication and Hyponatremia 2°/2 ETOHism. Past Medical History:   Diagnosis Date    Diabetes ([facility] [address]     ETOH abuse     Hepatitis C     Hypertension     Seizures ([facility] [address]     last seizure few weeks ago (12/8/14), gets them periodically     Past Surgical History:   Procedure Laterality Date    HX ORTHOPAEDIC Left     foot operation      History reviewed. No pertinent family history. Social History     Tobacco Use    Smoking status: Current Every Day Smoker     Packs/day: 0.50     Years: 44.00     Pack years: 22.00    Smokeless tobacco: Never Used   Substance Use Topics    Alcohol use: Yes     Alcohol/week: 56.0 standard drinks     Types: 56 Cans of beer per week     Comment: 8 Beers/night      Prior to Admission medications    Medication Sig Start Date End Date Taking? Authorizing Provider   PHENobarbital (LUMINAL) 60 mg tablet take 1 tablet by mouth twice a day 3/19/17  Yes Chuyita Reynoso MD   phenytoin ER (DILANTIN EXTENDED) 100 mg ER capsule Take 300 mg by mouth daily. Indications: EPILEPSY   Yes Provider, Historical   metFORMIN (GLUCOPHAGE) 500 mg tablet Take 500 mg by mouth daily (with breakfast).  Indications: type 2 diabetes mellitus   Yes Provider, Historical   hydrochlorothiazide (HYDRODIURIL) 25 mg tablet Take 25 mg by mouth daily. Yes Provider, Historical   acetaminophen-codeine (TYLENOL-CODEINE #3) 300-30 mg per tablet Take 1 Tab by mouth every four (4) hours as needed for Pain. Max Daily Amount: 6 Tabs. 11/28/17   LEXUS Bernal   phenytoin ER (DILANTIN EXTENDED) 100 mg ER capsule Take 200 mg by mouth nightly. Indications: EPILEPSY    Provider, Historical        No Known Allergies    Review of Systems:  (+) Right Leg Pain  (-) Fevers  (-) Chills  (-) Dyspnea on Exertion  (-) Chest Pain  (-) Abdominal Pain  (-) Nausea  (-) Vomiting  (-) Diarrhea  (-) Dysuria  All other systems have been reviewed and are negative      Objective:     Vitals:    04/27/21 0700 04/27/21 0715 04/27/21 0730 04/27/21 0745   BP: 116/78 114/78 114/76 114/84   Pulse: 74 71 71 75   Resp: 12 14 13 12   Temp:       SpO2: 93% 94% 95% 93%        Physical Exam:  General:  Older Adult -American male lying in bed in no acute distress  HEENT:  Atraumatic, normocephalic; (+) Miotic Pupils equally round and reactive to light with accommodation; Extraocular muscles intact; (+) Somewhat Moist Oropharynx without erythema, edema, or exudates; (+) Fair Dentition; (+) Cloth Mask in place  Chest:  No pectus carinatum; No pectus excavatum  Cardiovascular:  Regular rate and rhythm without rubs, gallops, or murmurs  Respiratory:  Clear to Auscultation Bilaterally without wheezes, rales, or rhonchi; normal effort of breathing  Abdominal:  Soft, non-tense, non-tender abdomen; BS present without guarding, rebound, or masses  :  Deferred  Extremities:  Pulses 2+ x3 without edema, clubbing, or cyanosis; (+) Right Leg is wrapped in ACE Bandage; (+) Capillary Refill in RLE is ~5 seconds;  Patient maintains Fair sensation in RLE and is able to move toes of Right Foot  Musculoskeletal:  Strength 5/5 and symmetrical in BUE and LLE  Integument:  No rash on face, forearms, or legs  Neurological:  A&O x4/4; No gross deficits of Visual Acuity, Eye Movement, Jaw Opening, Facial Expression, Hearing, Phonation, or Head Movement; No gross deficits of Tongue Movement or Slurring of Speech  Psychiatric:  (+) Affect is Minimally Blunted; Language is present and fluent; (+) Behavior is appropriate, but Patient has difficulty remember details of his past       Laboratory Studies:  CMP:   Lab Results   Component Value Date/Time     (L) 04/27/2021 01:23 AM    K 3.2 (L) 04/27/2021 01:23 AM    CL 92 (L) 04/27/2021 01:23 AM    CO2 26 04/27/2021 01:23 AM    AGAP 10 04/27/2021 01:23 AM    GLU 80 04/27/2021 01:23 AM    BUN 6 (L) 04/27/2021 01:23 AM    CREA 0.78 04/27/2021 01:23 AM    GFRAA >60 04/27/2021 01:23 AM    GFRNA >60 04/27/2021 01:23 AM    CA 9.0 04/27/2021 01:23 AM    ALB 4.0 04/27/2021 01:23 AM    TP 9.5 (H) 04/27/2021 01:23 AM    GLOB 5.5 (H) 04/27/2021 01:23 AM    AGRAT 0.7 (L) 04/27/2021 01:23 AM     (H) 04/27/2021 01:23 AM     CBC:   Lab Results   Component Value Date/Time    WBC 7.6 04/27/2021 01:23 AM    HGB 14.3 04/27/2021 01:23 AM    HCT 43.5 04/27/2021 01:23 AM     04/27/2021 01:23 AM     All Cardiac Markers in the last 24 hours: No results found for: CPK, CK, CKMMB, CKMB, RCK3, CKMBT, CKNDX, CKND1, LATRICIA, TROPT, TROIQ, SHEA, TROPT, TNIPOC, BNP, BNPP  Recent Glucose Results:   Lab Results   Component Value Date/Time    GLU 80 04/27/2021 01:23 AM     COAGS: No results found for: APTT, PTP, INR, INREXT, INREXT  Liver Panel:   Lab Results   Component Value Date/Time    ALB 4.0 04/27/2021 01:23 AM    TP 9.5 (H) 04/27/2021 01:23 AM    GLOB 5.5 (H) 04/27/2021 01:23 AM    AGRAT 0.7 (L) 04/27/2021 01:23 AM     (H) 04/27/2021 01:23 AM     04/27/2021 01:23 AM        Images Reviewed:  Michelle Saldana Tib/fib Rt    Result Date: 4/27/2021  EXAM: XR TIB/FIB RT INDICATION: injury to RLE after fall COMPARISON:  None.  FINDINGS: Comminuted predominantly transverse oblique fracture at the mid diaphysis of the right tibia. In addition, a nondisplaced longitudinal fracture extending cephalad from this reaching the posterior metadiaphyseal cortex. No articular surface involvement evident at the knee joint. There is accompanying moderately offset slightly comminuted L-shaped fracture at the fibular neck. No foreign body. No substantive regional degenerative change. 1.  Fractures within the right lower leg as outlined above. Xr Chest Port    Result Date: 4/27/2021  EXAM: AP portable chest. Indications: chest pain, sob, and/or arrhythmia Time stamp: 6:43 AM Comparison: November 2015 Findings: Lines/Tubes/Devices:  None LUNGS: No acute finding. Mild apical reticular scarring. MEDIASTINUM: Unremarkable BONES/SOFT TISSUES: No acute findings. Old left-sided rib fractures again noted.     : 1. No acute cardiopulmonary disease. I have personally reviewed the CXR and have found, per my read, atherosclerosis of aortic arch and increased vascular markings in lung-fields. Questionable slight to mild Pulmonary Edema. I have personally reviewed the EKG and have found, per my read, questionable LVH and low voltage Q-wave (or, perhaps, artifact) in lead aVL. Assessment:     Hospital Problems  Date Reviewed: 4/27/2021          Codes Class Noted POA    * (Principal) Closed fracture of right tibia and fibula ICD-10-CM: S82.201A, S82.401A  ICD-9-CM: 823.82  4/27/2021 Yes        Hyponatremia ICD-10-CM: E87.1  ICD-9-CM: 276.1  4/27/2021 Yes        Alcohol abuse ICD-10-CM: F10.10  ICD-9-CM: 305.00  4/27/2021 Yes              Plan:     CIWA Protocol, NPO (except medications), scant IV fluids (NS 50 mL/hr), PRN Anti-Emetics, PRN Pain Control. Orthopedic Surgical services were consulted in [facility] ER by [facility] ER Physician. Consulted Cardiological services to assess Q-wave in lead aVL in EKG. Continue home medications for Seizure Disorder and HTN. POC Glucose checks q6hrs with Corrective Insulin coverage.     PRN Nicotine Patch.    DVT mechanoprophylaxis in anticipation of surgical intervention. Work-up is currently incomplete---need further evaluation of Q-wave in lead aVL.     When Patient is \"Cleared for [facility]" and requires no additional work-up or optimization, this will not have eliminated or negated the risks of surgery described below:    RCRI  Rate of Cardiac Death, Non-Fatal MI, and Non-Fatal Cardiac Arrest:  PENDING  Rate of MI, PE, V-Fib, Primary Cardiac Arrest, and Complete Heart Block:  PENDING    [facility] Perioperative Cardiac Risk  Estimated Risk Probability for Perioperative MI or Cardiac Arrest:  0.15 %      Signed By: Whitney James DO     April 27, 2021

## 2021-04-27 NOTE — ED NOTES
Purposeful rounding completed:    Side rails up x 1:  YES  Bed in low position and wheels locked: YES  Call bell within reach: YES  Comfort addressed: YES    Toileting needs addressed: YES  Plan of care reviewed/updated with patient and or family members: YES  IV site assessed: YES  Pain assessed and addressed: YES, 0  Patient with positive pedal pulse in right foot, cap refill less than 3 seconds  Patient alert and oriented x 4, skin warm and dry

## 2021-04-27 NOTE — PERIOP NOTES
Pre-Op Summary    Pt arrived via bed and is oriented to time, place, person and situation. Visit Vitals  /82 (BP 1 Location: Left upper arm, BP Patient Position: At rest)   Pulse 80   Temp 99.7 °F (37.6 °C)   Resp 20   Ht 6' 2\" (1.88 m)   Wt 70.3 kg (155 lb)   SpO2 98%   BMI 19.90 kg/m²       Peripheral IV located on Right antecubital .    Patients belongings are located in  Pt. room. Patient's point of contact is wife, Kandi Stanley and their contact number is: NA. They are able to receive medication information. They will be admitted.

## 2021-04-27 NOTE — CONSULTS
Patient: Lala Sarmiento                MRN: [id number]       SSN: [id number]  YOB: 1960        AGE: 59 y.o. SEX: male  There is no height or weight on file to calculate BMI. PCP: Juanita Woods MD  04/27/21    CHIEF COMPLAINT: Right leg injury    HPI: Lala Sarmiento is a 59 y.o. male patient who presented to the emergency room last night after a fall at home. He says that he frequently has seizures although he does not think this was from a seizure. He believes this was from being drunk and he fell down the stairs. He noted immediate pain in the right leg and inability to walk. Presented to the emergency room where he was found to have right tibia and fibula fractures. He does have a history of seizures which he says he can have 2-3 times a month. He takes seizure medication for this. No other complaints. Past Medical History:   Diagnosis Date    Diabetes ([facility] [address]     Hepatitis C     Hypertension     Seizures ([facility]     last seizure few weeks ago (12/8/14), gets them periodically       History reviewed. No pertinent family history. Current Facility-Administered Medications   Medication Dose Route Frequency Provider Last Rate Last Admin    nicotine (NICODERM CQ) 14 mg/24 hr patch 1 Patch  1 Patch TransDERmal DAILY PRN [place] Luz Elena, DO         Current Outpatient Medications   Medication Sig Dispense Refill    acetaminophen-codeine (TYLENOL-CODEINE #3) 300-30 mg per tablet Take 1 Tab by mouth every four (4) hours as needed for Pain. Max Daily Amount: 6 Tabs. 8 Tab 0    PHENobarbital (LUMINAL) 60 mg tablet take 1 tablet by mouth twice a day 60 Tab 0    phenytoin ER (DILANTIN EXTENDED) 100 mg ER capsule Take 300 mg by mouth daily. Indications: EPILEPSY      phenytoin ER (DILANTIN EXTENDED) 100 mg ER capsule Take 200 mg by mouth nightly. Indications: EPILEPSY      metFORMIN (GLUCOPHAGE) 500 mg tablet Take 500 mg by mouth two (2) times daily (with meals). Indications: TYPE 2 DIABETES MELLITUS      hydrochlorothiazide (HYDRODIURIL) 25 mg tablet Take 25 mg by mouth daily.          No Known Allergies    Past Surgical History:   Procedure Laterality Date    HX ORTHOPAEDIC Left     foot operation       Social History     Socioeconomic History    Marital status:      Spouse name: Not on file    Number of children: Not on file    Years of education: Not on file    Highest education level: Not on file   Occupational History    Not on file   Social Needs    Financial resource strain: Not on file    Food insecurity     Worry: Not on file     Inability: Not on file    Transportation needs     Medical: Not on file     Non-medical: Not on file   Tobacco Use    Smoking status: Current Every Day Smoker     Packs/day: 0.50     Years: 44.00     Pack years: 22.00    Smokeless tobacco: Never Used   Substance and Sexual Activity    Alcohol use: Yes     Comment: occasionally    Drug use: No    Sexual activity: Not on file   Lifestyle    Physical activity     Days per week: Not on file     Minutes per session: Not on file    Stress: Not on file   Relationships    Social connections     Talks on phone: Not on file     Gets together: Not on file     Attends Baptism service: Not on file     Active member of club or organization: Not on file     Attends meetings of clubs or organizations: Not on file     Relationship status: Not on file    Intimate partner violence     Fear of current or ex partner: Not on file     Emotionally abused: Not on file     Physically abused: Not on file     Forced sexual activity: Not on file   Other Topics Concern    Not on file   Social History Narrative    Not on file       REVIEW OF SYSTEMS:      CON: negative for recent weight loss/gain, fever, or chills  EYE: negative for double or blurry vision  ENT: negative for hoarseness  RS:   negative for cough, URI, SOB  CV:  negative for chest pain, palpitations  GI:    negative for blood in stool, nausea/vomiting  :  negative for blood in urine  MS: As per HPI  Other systems reviewed and noted below. PHYSICAL EXAMINATION:  Visit Vitals  /84   Pulse 75   Temp 98 °F (36.7 °C)   Resp 12   SpO2 93%     There is no height or weight on file to calculate BMI. GENERAL: Alert and oriented x3, in no acute distress, thin male lying in the hospital stretcher. HEENT: Normocephalic, atraumatic. RESP: Non labored breathing with equal chest rise on inspiration. CV: Well perfused extremities. No cyanosis or clubbing noted. ABDOMEN: Soft, non-tender, non-distended. Right leg is splinted. He is tender palpation along the tibia shaft as well as the proximal fibula. Sensation is intact to light touch distally at the toes and foot. He is able to plantarflex and dorsiflex his toes. Cap refill is brisk. IMAGING:  X-rays of the right tibia and fibula were reviewed which show a midshaft displaced tibia fracture with some extension proximally. There is also a fracture of the proximal fibula. LABS:  Recent Results (from the past 24 hour(s))   CBC WITH AUTOMATED DIFF    Collection Time: 04/27/21  1:23 AM   Result Value Ref Range    WBC 7.6 4.6 - 13.2 K/uL    RBC 4.58 4.35 - 5.65 M/uL    HGB 14.3 13.0 - 16.0 g/dL    HCT 43.5 36.0 - 48.0 %    MCV 95.0 74.0 - 97.0 FL    MCH 31.2 24.0 - 34.0 PG    MCHC 32.9 31.0 - 37.0 g/dL    RDW 11.6 11.6 - 14.5 %    PLATELET 728 258 - 869 K/uL    MPV 10.1 9.2 - 11.8 FL    NEUTROPHILS 65 40 - 73 %    LYMPHOCYTES 26 21 - 52 %    MONOCYTES 8 3 - 10 %    EOSINOPHILS 1 0 - 5 %    BASOPHILS 0 0 - 2 %    ABS. NEUTROPHILS 4.9 1.8 - 8.0 K/UL    ABS. LYMPHOCYTES 2.0 0.9 - 3.6 K/UL    ABS. MONOCYTES 0.6 0.05 - 1.2 K/UL    ABS. EOSINOPHILS 0.1 0.0 - 0.4 K/UL    ABS.  BASOPHILS 0.0 0.0 - 0.1 K/UL    DF AUTOMATED     METABOLIC PANEL, COMPREHENSIVE    Collection Time: 04/27/21  1:23 AM   Result Value Ref Range    Sodium 128 (L) 136 - 145 mmol/L    Potassium 3.2 (L) 3.5 - 5.5 mmol/L    Chloride 92 (L) 100 - 111 mmol/L    CO2 26 21 - 32 mmol/L    Anion gap 10 3.0 - 18 mmol/L    Glucose 80 74 - 99 mg/dL    BUN 6 (L) 7.0 - 18 MG/DL    Creatinine 0.78 0.6 - 1.3 MG/DL    BUN/Creatinine ratio 8 (L) 12 - 20      GFR est AA >60 >60 ml/min/1.73m2    GFR est non-AA >60 >60 ml/min/1.73m2    Calcium 9.0 8.5 - 10.1 MG/DL    Bilirubin, total 0.8 0.2 - 1.0 MG/DL    ALT (SGPT) 119 (H) 16 - 61 U/L    AST (SGOT) 105 (H) 10 - 38 U/L    Alk. phosphatase 101 45 - 117 U/L    Protein, total 9.5 (H) 6.4 - 8.2 g/dL    Albumin 4.0 3.4 - 5.0 g/dL    Globulin 5.5 (H) 2.0 - 4.0 g/dL    A-G Ratio 0.7 (L) 0.8 - 1.7     URINALYSIS W/ RFLX MICROSCOPIC    Collection Time: 04/27/21  7:50 AM   Result Value Ref Range    Color YELLOW      Appearance CLEAR      Specific gravity 1.009 1.005 - 1.030      pH (UA) 5.5 5.0 - 8.0      Protein Negative NEG mg/dL    Glucose Negative NEG mg/dL    Ketone Negative NEG mg/dL    Bilirubin Negative NEG      Blood TRACE (A) NEG      Urobilinogen 0.2 0.2 - 1.0 EU/dL    Nitrites Negative NEG      Leukocyte Esterase Negative NEG     DRUG SCREEN, URINE    Collection Time: 04/27/21  7:50 AM   Result Value Ref Range    BENZODIAZEPINES Negative NEG      BARBITURATES Positive (A) NEG      THC (TH-CANNABINOL) Negative NEG      OPIATES Positive (A) NEG      PCP(PHENCYCLIDINE) Negative NEG      COCAINE Negative NEG      AMPHETAMINES Negative NEG      METHADONE Negative NEG      HDSCOM (NOTE)    SARS-COV-2    Collection Time: 04/27/21  7:50 AM   Result Value Ref Range    SARS-CoV-2 Please find results under separate order           ASSESSMENT & PLAN  Diagnosis: Right tibia shaft and fibula fracture    -Discussed operative and non operative management with patient.   He would like to proceed with surgery  -Admit to hospitalist for medical clearance  -Keep NPO  -Hold anticoagulation  -Seizure control  -Pre op labs ordered  -Covid test ordered  -Plan for right tibia IM nail later today if cleared  -Thank you for consult      Electronically signed by: Kirsten Golden MD

## 2021-04-27 NOTE — ED PROVIDER NOTES
EMERGENCY DEPARTMENT HISTORY AND PHYSICAL EXAM      Date: 4/27/2021  Patient Name: Duncan Flaherty    History of Presenting Illness     Chief Complaint   Patient presents with   Silvia Enedina Fall       History (Context): Duncan Flaherty is a 59 y.o. gentleman with a history of seizures and a complicated set of comorbid conditions as noted below who presents after a seizure with acute onset, severe, persistent right leg pain made worse with attempts to walk or movement, without clear relieving features or other associated symptoms. On review of systems, the patient denies headache, head pain, neck pain, facial pain, chest pain, back pain, abdominal pain, pelvic pain, extremity pain, numbness, weakness, tingling,. PCP: Isidra Chavez MD    Current Facility-Administered Medications   Medication Dose Route Frequency Provider Last Rate Last Admin    bupivacaine (PF) (MARCAINE) 0.5 % (5 mg/mL) injection 50 mg  10 mL SubCUTAneous ONCE Jeronimo MD Janak         Current Outpatient Medications   Medication Sig Dispense Refill    acetaminophen-codeine (TYLENOL-CODEINE #3) 300-30 mg per tablet Take 1 Tab by mouth every four (4) hours as needed for Pain. Max Daily Amount: 6 Tabs. 8 Tab 0    PHENobarbital (LUMINAL) 60 mg tablet take 1 tablet by mouth twice a day 60 Tab 0    phenytoin ER (DILANTIN EXTENDED) 100 mg ER capsule Take 300 mg by mouth daily. Indications: EPILEPSY      phenytoin ER (DILANTIN EXTENDED) 100 mg ER capsule Take 200 mg by mouth nightly. Indications: EPILEPSY      metFORMIN (GLUCOPHAGE) 500 mg tablet Take 500 mg by mouth two (2) times daily (with meals). Indications: TYPE 2 DIABETES MELLITUS      hydrochlorothiazide (HYDRODIURIL) 25 mg tablet Take 25 mg by mouth daily.          Past History     Past Medical History:  Past Medical History:   Diagnosis Date    Diabetes ([facility] [address]     Hepatitis C     Hypertension     Seizures ([facility] [address]     last seizure few weeks ago (12/8/14), gets them periodically Past Surgical History:  Past Surgical History:   Procedure Laterality Date    HX ORTHOPAEDIC Left     foot operation       Family History:  History reviewed. No pertinent family history. Social History:  Social History     Tobacco Use    Smoking status: Current Every Day Smoker     Packs/day: 0.50     Years: 44.00     Pack years: 22.00    Smokeless tobacco: Never Used   Substance Use Topics    Alcohol use: Yes     Comment: occasionally    Drug use: No       Allergies:  No Known Allergies    PMH, PSH, family history, social history, allergies reviewed with the patient with significant items noted above. Review of Systems   As per HPI, otherwise reviewed and negative. Physical Exam     Vitals:    04/27/21 0445 04/27/21 0530 04/27/21 0630 04/27/21 0645   BP: 109/72 113/79 120/89 117/81   Pulse: 68 71 77 76   Resp: 12 12 15 15   Temp:       SpO2: 99% 95% 96% 97%       Gen: In clear pain  HEENT: Atraumatic , normocephalic, sclera anicteric, no garza sign, no raccoon eyes, no hemotympanum, trachea is midline. Cardiovascular: Normal rate, regular rhythm, no murmurs, rubs, gallops. Radial pulses 2+, dorsalis pedis pulses 2+  Pulmonary: No bruising or crepitus to the chest.  Bilateral breath sounds equal with equal chest rise. No respiratory distress. No stridor. Clear lungs. ABD: Soft, nontender, nondistended. No seatbelt sign. Neuro: GCS 15. Alert. Normal speech. Normal mentation. Full strength and sensation throughout distal right extremity  Psych: Normal thought content and thought processes. : No CVA tenderness. Pelvis stable  EXT: Right lower leg midshaft deformity. Pulses intact. Skin warm to touch. Mild swelling in the area. .  No cyanosis or clubbing. Skin: Warm and well-perfused.   Other:        Diagnostic Study Results     Labs -     Recent Results (from the past 12 hour(s))   CBC WITH AUTOMATED DIFF    Collection Time: 04/27/21  1:23 AM   Result Value Ref Range    WBC 7.6 4.6 - 13.2 K/uL    RBC 4.58 4.35 - 5.65 M/uL    HGB 14.3 13.0 - 16.0 g/dL    HCT 43.5 36.0 - 48.0 %    MCV 95.0 74.0 - 97.0 FL    MCH 31.2 24.0 - 34.0 PG    MCHC 32.9 31.0 - 37.0 g/dL    RDW 11.6 11.6 - 14.5 %    PLATELET 614 336 - 031 K/uL    MPV 10.1 9.2 - 11.8 FL    NEUTROPHILS 65 40 - 73 %    LYMPHOCYTES 26 21 - 52 %    MONOCYTES 8 3 - 10 %    EOSINOPHILS 1 0 - 5 %    BASOPHILS 0 0 - 2 %    ABS. NEUTROPHILS 4.9 1.8 - 8.0 K/UL    ABS. LYMPHOCYTES 2.0 0.9 - 3.6 K/UL    ABS. MONOCYTES 0.6 0.05 - 1.2 K/UL    ABS. EOSINOPHILS 0.1 0.0 - 0.4 K/UL    ABS. BASOPHILS 0.0 0.0 - 0.1 K/UL    DF AUTOMATED     METABOLIC PANEL, COMPREHENSIVE    Collection Time: 04/27/21  1:23 AM   Result Value Ref Range    Sodium 128 (L) 136 - 145 mmol/L    Potassium 3.2 (L) 3.5 - 5.5 mmol/L    Chloride 92 (L) 100 - 111 mmol/L    CO2 26 21 - 32 mmol/L    Anion gap 10 3.0 - 18 mmol/L    Glucose 80 74 - 99 mg/dL    BUN 6 (L) 7.0 - 18 MG/DL    Creatinine 0.78 0.6 - 1.3 MG/DL    BUN/Creatinine ratio 8 (L) 12 - 20      GFR est AA >60 >60 ml/min/1.73m2    GFR est non-AA >60 >60 ml/min/1.73m2    Calcium 9.0 8.5 - 10.1 MG/DL    Bilirubin, total 0.8 0.2 - 1.0 MG/DL    ALT (SGPT) 119 (H) 16 - 61 U/L    AST (SGOT) 105 (H) 10 - 38 U/L    Alk. phosphatase 101 45 - 117 U/L    Protein, total 9.5 (H) 6.4 - 8.2 g/dL    Albumin 4.0 3.4 - 5.0 g/dL    Globulin 5.5 (H) 2.0 - 4.0 g/dL    A-G Ratio 0.7 (L) 0.8 - 1.7         Radiologic Studies -   XR TIB/FIB RT    (Results Pending)   XR CHEST PORT    (Results Pending)   XR TIB/FIB RT    (Results Pending)     CT Results  (Last 48 hours)    None        CXR Results  (Last 48 hours)    None            Medical Decision Making   I am the first provider for this patient. I reviewed the vital signs, available nursing notes, past medical history, past surgical history, family history and social history. Vital Signs-Reviewed the patient's vital signs. EKG: Interpreted by myself.       Records Reviewed: Personally, on initial evaluation    MDM:   Patient presents with obvious leg deformity after fall/seizure. Exam significant for obvious leg deformity, but neurovascularly intact. .   DDX considered likely in this particular patient: Fracture  DDX always considered in trauma patient: Vascular injury, skull fracture, facial fractures, pneumothorax, skeletal fractures, dislocations, intrathoracic or intra-abdominal bleeding or injury to organs, active bleeding, pelvic fracture, nonaccidental trauma. Patient condition on initial evaluation: Stable    Plan:   Pain Control  Close Observation  Meds:     Orders as below:  Orders Placed This Encounter    XR TIB/FIB RT    XR CHEST PORT    XR TIB/FIB RT    CBC WITH AUTOMATED DIFF    METABOLIC PANEL, COMPREHENSIVE    CBC WITH AUTOMATED DIFF    COMPREHENSIVE METABOLIC PANEL    ETHYL ALCOHOL    URINALYSIS W/ RFLX MICROSCOPIC    Urine Drug Screen    PROTHROMBIN TIME + INR    SARS-COV-2    EKG, 12 LEAD, INITIAL    TYPE & SCREEN    HYDROmorphone (DILAUDID) injection 2 mg    bupivacaine (PF) (MARCAINE) 0.5 % (5 mg/mL) injection 50 mg    HYDROmorphone (DILAUDID) injection 1 mg        ED Course:   Splint to the patient without issue after utilizing an ultrasound-guided hematoma block. Spoke to orthopedic surgery, and the patient will be admitted to the hospital.  Repeat vascular checks at 9150 were normal.    Patient condition at time of disposition: Stable        Splint, Long Leg    Date/Time: 4/27/2021 9:05 AM  Performed by: Ca Cummins MD  Authorized by: Ca Cummins MD     Consent:     Consent obtained:  Verbal    Consent given by:  Patient    Risks discussed:  Numbness, discoloration, pain and swelling  Procedure details:     Location:  Leg    Leg:  R lower leg    Splint type:  Long leg    Supplies:  Ortho-Glass  Post-procedure details:     Pain:  Unchanged    Sensation:  Normal    Patient tolerance of procedure:   Tolerated well, no immediate complications  Nerve Block    Date/Time: 4/27/2021 9:06 AM  Performed by: Kristin Saravia MD  Authorized by: Kristin Saravia MD     Consent:     Consent obtained:  Verbal  Post-procedure details:     Outcome:  Anesthesia achieved  Comments:      Performed ultrasound-guided hematoma block and hematoma caused by the right tibial fracture utilizing a 20-gauge spinal needle. Foundthe appropriate space with ultrasound guidance using sterile procedure and universal precautions. Aspirated blood. Gave 30 cc of 0.5% bupivacaine. Patient tolerated well          Critical Care Time:     Disposition: Admit    Diagnosis     Clinical Impression:   1. Seizure ([address]    2. Closed displaced transverse fracture of shaft of right tibia, initial encounter        Signed,  Eric Gonzales MD  Emergency Physician  [facility]    As a voice dictation software was utilized to dictate this note, minor word transpositions can occur. I apologize for confusing wording and typographic errors. Please feel free to contact me for clarification.

## 2021-04-27 NOTE — PROGRESS NOTES
Patient seen in PACU  Patient admitted this morning, seen for follow-up. Patient drowsy, opens eyes on verbal commands. Visit Vitals  BP (!) 147/92   Pulse 72   Temp 97.5 °F (36.4 °C)   Resp 18   Ht 6' 2\" (1.88 m)   Wt 70.3 kg (155 lb)   SpO2 100%   BMI 19.90 kg/m²       Exam  General: Sleepy due to anesthesia, no acute distress    Pulmonary:  CTA Bilaterally. No Wheezing//Rales. Cardiovascular: Regular rate and Rhythm. GI:  Soft, Non distended, Non tender. + Bowel sounds. Extremities:  No edema. Right foot dressing clean    Labs, chart, and vitals noted    Will continue thiamine, folic acid and multivitamin. Continue CIWA protocol. Discussed with Ortho team, Lovenox for DVT prophylaxis from tomorrow. We will continue phenytoin, phenobarbital  We will place patient on seizure precaution      Disclaimer: Sections of this note are dictated using utilizing voice recognition software. Minor typographical errors may be present. If questions arise, please do not hesitate to contact me or call our department.

## 2021-04-27 NOTE — INTERVAL H&P NOTE
Update History & Physical    The Patient's History and Physical of April 27, 2021 was reviewed with the patient and I examined the patient. There was no change. The surgical site was confirmed by the patient and me. Plan:  The risk, benefits, expected outcome, and alternative to the recommended procedure have been discussed with the patient. Patient understands and wants to proceed with the procedure.     Electronically signed by Chandana Costa MD on 4/27/2021 at 3:36 PM

## 2021-04-27 NOTE — ROUTINE PROCESS
TRANSFER - IN REPORT:    Verbal report received from Wen RN(name) on Huber Jennings  being received from [facility](unit) for ordered procedure      Report consisted of patients Situation, Background, Assessment and   Recommendations(SBAR). Information from the following report(s) SBAR and Kardex was reviewed with the receiving nurse. Opportunity for questions and clarification was provided. Assessment completed upon patients arrival to unit and care assumed.

## 2021-04-27 NOTE — ED NOTES
No acute issues with patient overnight. Care of patient turned over to [facility], [facility].

## 2021-04-27 NOTE — PROGRESS NOTES
Reason for Admission:  Closed fracture of right tibia and fibula [S82.201A, S82.401A]                 RUR Score:    11%            Plan for utilizing home health:    Yes, freedom of choice signed for any local agency that accepts his insurance. Likelihood of Readmission:   LOW                         Transition of Care Plan:              Initial assessment completed with patient. Cognitive status of patient: oriented to time, place, person and situation. Face sheet information confirmed:  yes. The patient designates spouse, Inell Luiza to participate in his discharge plan and to receive any needed information. This patient lives in a boarding house with his spouse and multiple roommates. Patient is not able to navigate steps as needed. Prior to hospitalization, patient was considered to be independent with ADLs/IADLS : yes . Patient has a current ACP document on file: no      Healthcare Decision Maker:   Primary Decision Maker: Josef Ac - Spouse - [phone]    Click here to complete [address] including selection of the Healthcare Decision Maker Relationship (ie \"Primary\")    The patient will probably need transport home upon discharge. The patient has no DME available in the home. Patient is not currently active with home health. Patient has not stayed in a skilled nursing facility or rehab. This patient is on dialysis :no    List of available Home Health agencies were provided and reviewed with the patient prior to discharge. Freedom of choice signed: yes, for any local agency that accepts his insurance. Currently, the discharge plan is Home with [address] [place] [address] [facility] Manuel. The patient states that he can obtain his medications from the pharmacy, and take his medications as directed. Patient's current insurance is [facility] Medicaid. Care Management Interventions  PCP Verified by CM:  Yes  Mode of Transport at Discharge: Self  Transition of Care Consult (CM Consult): Discharge Planning  Current Support Network: Lives with Spouse  Confirm Follow Up Transport: Friends  Discharge Location  Discharge Placement: Home        Sanjay Cardenas RN - Outcomes Manager  [phone]

## 2021-04-27 NOTE — H&P (VIEW-ONLY)
Patient: Evita Langston                MRN: [id number]       SSN: [id number] YOB: 1960        AGE: 59 y.o. SEX: male There is no height or weight on file to calculate BMI. PCP: Melany Wharton MD 
04/27/21 CHIEF COMPLAINT: Right leg injury HPI: Evita Langston is a 59 y.o. male patient who presented to the emergency room last night after a fall at home. He says that he frequently has seizures although he does not think this was from a seizure. He believes this was from being drunk and he fell down the stairs. He noted immediate pain in the right leg and inability to walk. Presented to the emergency room where he was found to have right tibia and fibula fractures. He does have a history of seizures which he says he can have 2-3 times a month. He takes seizure medication for this. No other complaints. Past Medical History:  
Diagnosis Date  Diabetes ([facility] [address]  Hepatitis C   
 Hypertension  Seizures ([facility] [address]   
 last seizure few weeks ago (12/8/14), gets them periodically History reviewed. No pertinent family history. Current Facility-Administered Medications Medication Dose Route Frequency Provider Last Rate Last Admin  
 nicotine (NICODERM CQ) 14 mg/24 hr patch 1 Patch  1 Patch TransDERmal DAILY PRN Bj Zaidi, DO      
 
Current Outpatient Medications Medication Sig Dispense Refill  acetaminophen-codeine (TYLENOL-CODEINE #3) 300-30 mg per tablet Take 1 Tab by mouth every four (4) hours as needed for Pain. Max Daily Amount: 6 Tabs. 8 Tab 0  
 PHENobarbital (LUMINAL) 60 mg tablet take 1 tablet by mouth twice a day 60 Tab 0  phenytoin ER (DILANTIN EXTENDED) 100 mg ER capsule Take 300 mg by mouth daily. Indications: EPILEPSY  phenytoin ER (DILANTIN EXTENDED) 100 mg ER capsule Take 200 mg by mouth nightly. Indications: EPILEPSY  metFORMIN (GLUCOPHAGE) 500 mg tablet Take 500 mg by mouth two (2) times daily (with meals). Indications: TYPE 2 DIABETES MELLITUS  hydrochlorothiazide (HYDRODIURIL) 25 mg tablet Take 25 mg by mouth daily. No Known Allergies Past Surgical History:  
Procedure Laterality Date  HX ORTHOPAEDIC Left   
 foot operation Social History Socioeconomic History  Marital status:  Spouse name: Not on file  Number of children: Not on file  Years of education: Not on file  Highest education level: Not on file Occupational History  Not on file Social Needs  Financial resource strain: Not on file  Food insecurity Worry: Not on file Inability: Not on file  Transportation needs Medical: Not on file Non-medical: Not on file Tobacco Use  Smoking status: Current Every Day Smoker Packs/day: 0.50 Years: 44.00 Pack years: 22.00  Smokeless tobacco: Never Used Substance and Sexual Activity  Alcohol use: Yes Comment: occasionally  Drug use: No  
 Sexual activity: Not on file Lifestyle  Physical activity Days per week: Not on file Minutes per session: Not on file  Stress: Not on file Relationships  Social connections Talks on phone: Not on file Gets together: Not on file Attends [employer] service: Not on file Active member of club or organization: Not on file Attends meetings of clubs or organizations: Not on file Relationship status: Not on file  Intimate partner violence Fear of current or ex partner: Not on file Emotionally abused: Not on file Physically abused: Not on file Forced sexual activity: Not on file Other Topics Concern  Not on file Social History Narrative  Not on file REVIEW OF SYSTEMS:   
 
CON: negative for recent weight loss/gain, fever, or chills EYE: negative for double or blurry vision ENT: negative for hoarseness RS:   negative for cough, URI, SOB 
CV:  negative for chest pain, palpitations GI:    negative for blood in stool, nausea/vomiting :  negative for blood in urine MS: As per HPI Other systems reviewed and noted below. PHYSICAL EXAMINATION: 
Visit Vitals /84 Pulse 75 Temp 98 °F (36.7 °C) Resp 12 SpO2 93% There is no height or weight on file to calculate BMI. GENERAL: Alert and oriented x3, in no acute distress, thin male lying in the hospital stretcher. HEENT: Normocephalic, atraumatic. RESP: Non labored breathing with equal chest rise on inspiration. CV: Well perfused extremities. No cyanosis or clubbing noted. ABDOMEN: Soft, non-tender, non-distended. Right leg is splinted. He is tender palpation along the tibia shaft as well as the proximal fibula. Sensation is intact to light touch distally at the toes and foot. He is able to plantarflex and dorsiflex his toes. Cap refill is brisk. IMAGING: 
X-rays of the right tibia and fibula were reviewed which show a midshaft displaced tibia fracture with some extension proximally. There is also a fracture of the proximal fibula. LABS: 
Recent Results (from the past 24 hour(s)) CBC WITH AUTOMATED DIFF Collection Time: 04/27/21  1:23 AM  
Result Value Ref Range WBC 7.6 4.6 - 13.2 K/uL  
 RBC 4.58 4.35 - 5.65 M/uL  
 HGB 14.3 13.0 - 16.0 g/dL HCT 43.5 36.0 - 48.0 % MCV 95.0 74.0 - 97.0 FL  
 MCH 31.2 24.0 - 34.0 PG  
 MCHC 32.9 31.0 - 37.0 g/dL  
 RDW 11.6 11.6 - 14.5 % PLATELET 977 818 - 956 K/uL MPV 10.1 9.2 - 11.8 FL  
 NEUTROPHILS 65 40 - 73 % LYMPHOCYTES 26 21 - 52 % MONOCYTES 8 3 - 10 % EOSINOPHILS 1 0 - 5 % BASOPHILS 0 0 - 2 %  
 ABS. NEUTROPHILS 4.9 1.8 - 8.0 K/UL  
 ABS. LYMPHOCYTES 2.0 0.9 - 3.6 K/UL  
 ABS. MONOCYTES 0.6 0.05 - 1.2 K/UL  
 ABS. EOSINOPHILS 0.1 0.0 - 0.4 K/UL  
 ABS. BASOPHILS 0.0 0.0 - 0.1 K/UL  
 DF AUTOMATED METABOLIC PANEL, COMPREHENSIVE Collection Time: 04/27/21  1:23 AM  
Result Value Ref Range Sodium 128 (L) 136 - 145 mmol/L  Potassium 3.2 (L) 3.5 - 5.5 mmol/L Chloride 92 (L) 100 - 111 mmol/L  
 CO2 26 21 - 32 mmol/L Anion gap 10 3.0 - 18 mmol/L Glucose 80 74 - 99 mg/dL BUN 6 (L) 7.0 - 18 MG/DL Creatinine 0.78 0.6 - 1.3 MG/DL  
 BUN/Creatinine ratio 8 (L) 12 - 20 GFR est AA >60 >60 ml/min/1.73m2 GFR est non-AA >60 >60 ml/min/1.73m2 Calcium 9.0 8.5 - 10.1 MG/DL Bilirubin, total 0.8 0.2 - 1.0 MG/DL  
 ALT (SGPT) 119 (H) 16 - 61 U/L  
 AST (SGOT) 105 (H) 10 - 38 U/L Alk. phosphatase 101 45 - 117 U/L Protein, total 9.5 (H) 6.4 - 8.2 g/dL Albumin 4.0 3.4 - 5.0 g/dL Globulin 5.5 (H) 2.0 - 4.0 g/dL A-G Ratio 0.7 (L) 0.8 - 1.7 URINALYSIS W/ RFLX MICROSCOPIC Collection Time: 04/27/21  7:50 AM  
Result Value Ref Range Color YELLOW Appearance CLEAR Specific gravity 1.009 1.005 - 1.030    
 pH (UA) 5.5 5.0 - 8.0 Protein Negative NEG mg/dL Glucose Negative NEG mg/dL Ketone Negative NEG mg/dL Bilirubin Negative NEG Blood TRACE (A) NEG Urobilinogen 0.2 0.2 - 1.0 EU/dL Nitrites Negative NEG Leukocyte Esterase Negative NEG DRUG SCREEN, URINE Collection Time: 04/27/21  7:50 AM  
Result Value Ref Range BENZODIAZEPINES Negative NEG    
 BARBITURATES Positive (A) NEG    
 THC (TH-CANNABINOL) Negative NEG    
 OPIATES Positive (A) NEG    
 PCP(PHENCYCLIDINE) Negative NEG    
 COCAINE Negative NEG    
 AMPHETAMINES Negative NEG METHADONE Negative NEG HDSCOM (NOTE) SARS-COV-2 Collection Time: 04/27/21  7:50 AM  
Result Value Ref Range SARS-CoV-2 Please find results under separate order ASSESSMENT & PLAN Diagnosis: Right tibia shaft and fibula fracture 
 
-Discussed operative and non operative management with patient.   He would like to proceed with surgery 
-Admit to hospitalist for medical clearance 
-Keep NPO 
-Hold anticoagulation 
-Seizure control 
-Pre op labs ordered 
-Covid test ordered 
-Plan for right tibia IM nail later today if cleared 
-Thank you for consult Electronically signed by: Khalida Cm MD

## 2021-04-28 LAB
ALBUMIN SERPL-MCNC: 3.1 G/DL (ref 3.4–5)
ALBUMIN/GLOB SERPL: 0.8 {RATIO} (ref 0.8–1.7)
ALP SERPL-CCNC: 78 U/L (ref 45–117)
ALT SERPL-CCNC: 77 U/L (ref 16–61)
ANION GAP SERPL CALC-SCNC: 3 MMOL/L (ref 3–18)
AST SERPL-CCNC: 64 U/L (ref 10–38)
BASOPHILS # BLD: 0 K/UL (ref 0–0.1)
BASOPHILS NFR BLD: 0 % (ref 0–2)
BILIRUB SERPL-MCNC: 0.6 MG/DL (ref 0.2–1)
BUN SERPL-MCNC: 9 MG/DL (ref 7–18)
BUN/CREAT SERPL: 10 (ref 12–20)
CALCIUM SERPL-MCNC: 8.3 MG/DL (ref 8.5–10.1)
CHLORIDE SERPL-SCNC: 98 MMOL/L (ref 100–111)
CO2 SERPL-SCNC: 30 MMOL/L (ref 21–32)
CREAT SERPL-MCNC: 0.92 MG/DL (ref 0.6–1.3)
DIFFERENTIAL METHOD BLD: ABNORMAL
EOSINOPHIL # BLD: 0 K/UL (ref 0–0.4)
EOSINOPHIL NFR BLD: 0 % (ref 0–5)
ERYTHROCYTE [DISTWIDTH] IN BLOOD BY AUTOMATED COUNT: 11.5 % (ref 11.6–14.5)
EST. AVERAGE GLUCOSE BLD GHB EST-MCNC: 94 MG/DL
GLOBULIN SER CALC-MCNC: 4.1 G/DL (ref 2–4)
GLUCOSE BLD STRIP.AUTO-MCNC: 109 MG/DL (ref 70–110)
GLUCOSE BLD STRIP.AUTO-MCNC: 128 MG/DL (ref 70–110)
GLUCOSE BLD STRIP.AUTO-MCNC: 93 MG/DL (ref 70–110)
GLUCOSE BLD STRIP.AUTO-MCNC: 94 MG/DL (ref 70–110)
GLUCOSE SERPL-MCNC: 99 MG/DL (ref 74–99)
HBA1C MFR BLD: 4.9 % (ref 4.2–5.6)
HCT VFR BLD AUTO: 34.4 % (ref 36–48)
HGB BLD-MCNC: 11.3 G/DL (ref 13–16)
LYMPHOCYTES # BLD: 1.2 K/UL (ref 0.9–3.6)
LYMPHOCYTES NFR BLD: 19 % (ref 21–52)
MCH RBC QN AUTO: 31.2 PG (ref 24–34)
MCHC RBC AUTO-ENTMCNC: 32.8 G/DL (ref 31–37)
MCV RBC AUTO: 95 FL (ref 74–97)
MONOCYTES # BLD: 0.7 K/UL (ref 0.05–1.2)
MONOCYTES NFR BLD: 11 % (ref 3–10)
NEUTS SEG # BLD: 4.4 K/UL (ref 1.8–8)
NEUTS SEG NFR BLD: 69 % (ref 40–73)
PLATELET # BLD AUTO: 110 K/UL (ref 135–420)
PMV BLD AUTO: 10.8 FL (ref 9.2–11.8)
POTASSIUM SERPL-SCNC: 4.5 MMOL/L (ref 3.5–5.5)
PROT SERPL-MCNC: 7.2 G/DL (ref 6.4–8.2)
RBC # BLD AUTO: 3.62 M/UL (ref 4.35–5.65)
SODIUM SERPL-SCNC: 131 MMOL/L (ref 136–145)
WBC # BLD AUTO: 6.4 K/UL (ref 4.6–13.2)

## 2021-04-28 PROCEDURE — 97530 THERAPEUTIC ACTIVITIES: CPT

## 2021-04-28 PROCEDURE — 97162 PT EVAL MOD COMPLEX 30 MIN: CPT

## 2021-04-28 PROCEDURE — 97166 OT EVAL MOD COMPLEX 45 MIN: CPT

## 2021-04-28 PROCEDURE — 99233 SBSQ HOSP IP/OBS HIGH 50: CPT | Performed by: HOSPITALIST

## 2021-04-28 PROCEDURE — 2709999900 HC NON-CHARGEABLE SUPPLY

## 2021-04-28 PROCEDURE — 74011250636 HC RX REV CODE- 250/636: Performed by: ORTHOPAEDIC SURGERY

## 2021-04-28 PROCEDURE — 85025 COMPLETE CBC W/AUTO DIFF WBC: CPT

## 2021-04-28 PROCEDURE — 97535 SELF CARE MNGMENT TRAINING: CPT

## 2021-04-28 PROCEDURE — 77030040831 HC BAG URINE DRNG MDII -A

## 2021-04-28 PROCEDURE — 80053 COMPREHEN METABOLIC PANEL: CPT

## 2021-04-28 PROCEDURE — 36415 COLL VENOUS BLD VENIPUNCTURE: CPT

## 2021-04-28 PROCEDURE — 65270000029 HC RM PRIVATE

## 2021-04-28 PROCEDURE — 74011250637 HC RX REV CODE- 250/637: Performed by: ORTHOPAEDIC SURGERY

## 2021-04-28 PROCEDURE — 99024 POSTOP FOLLOW-UP VISIT: CPT | Performed by: ORTHOPAEDIC SURGERY

## 2021-04-28 PROCEDURE — 74011250637 HC RX REV CODE- 250/637: Performed by: INTERNAL MEDICINE

## 2021-04-28 PROCEDURE — 77030038269 HC DRN EXT URIN PURWCK BARD -A

## 2021-04-28 PROCEDURE — 83036 HEMOGLOBIN GLYCOSYLATED A1C: CPT

## 2021-04-28 PROCEDURE — 74011250636 HC RX REV CODE- 250/636: Performed by: INTERNAL MEDICINE

## 2021-04-28 PROCEDURE — 99232 SBSQ HOSP IP/OBS MODERATE 35: CPT | Performed by: INTERNAL MEDICINE

## 2021-04-28 PROCEDURE — 82962 GLUCOSE BLOOD TEST: CPT

## 2021-04-28 PROCEDURE — 97116 GAIT TRAINING THERAPY: CPT

## 2021-04-28 PROCEDURE — 81001 URINALYSIS AUTO W/SCOPE: CPT

## 2021-04-28 RX ADMIN — Medication 10 ML: at 05:14

## 2021-04-28 RX ADMIN — Medication 10 ML: at 17:38

## 2021-04-28 RX ADMIN — ENOXAPARIN SODIUM 40 MG: 40 INJECTION SUBCUTANEOUS at 09:41

## 2021-04-28 RX ADMIN — ACETAMINOPHEN 650 MG: 325 TABLET, FILM COATED ORAL at 17:29

## 2021-04-28 RX ADMIN — FOLIC ACID 1 MG: 1 TABLET ORAL at 09:35

## 2021-04-28 RX ADMIN — PHENOBARBITAL 64.8 MG: 64.8 TABLET ORAL at 17:28

## 2021-04-28 RX ADMIN — Medication 100 MG: at 09:35

## 2021-04-28 RX ADMIN — OXYCODONE HYDROCHLORIDE 10 MG: 5 TABLET ORAL at 09:35

## 2021-04-28 RX ADMIN — CEFAZOLIN SODIUM 2 G: 2 SOLUTION INTRAVENOUS at 06:51

## 2021-04-28 RX ADMIN — LORAZEPAM 1 MG: 1 TABLET ORAL at 09:41

## 2021-04-28 RX ADMIN — Medication 10 ML: at 23:42

## 2021-04-28 RX ADMIN — PHENYTOIN SODIUM 200 MG: 100 CAPSULE ORAL at 23:42

## 2021-04-28 RX ADMIN — LORAZEPAM 2 MG: 2 INJECTION INTRAMUSCULAR; INTRAVENOUS at 12:30

## 2021-04-28 RX ADMIN — PHENOBARBITAL 64.8 MG: 64.8 TABLET ORAL at 09:35

## 2021-04-28 RX ADMIN — THERA TABS 1 TABLET: TAB at 09:35

## 2021-04-28 RX ADMIN — PHENYTOIN SODIUM 300 MG: 100 CAPSULE ORAL at 09:35

## 2021-04-28 RX ADMIN — Medication 20 ML: at 05:14

## 2021-04-28 RX ADMIN — LORAZEPAM 2 MG: 1 TABLET ORAL at 17:29

## 2021-04-28 NOTE — PROGRESS NOTES
Problem: Self Care Deficits Care Plan (Adult)  Goal: *Acute Goals and Plan of Care (Insert Text)  Description: Occupational Therapy Goals  Initiated 4/28/2021 within 7 day(s). 1.  Patient will perform bed mobility in preparation for selfcare with modified independence. 2.  Patient will perform functional activity sitting EOB for 4-7 minutes with G balance , supervision/set-up. 3.  Patient will perform lower body dressing with supervision/set-up using AE prn.  4.  Patient will perform toilet transfers with supervision/set-up maintaining WB with 100% accuracy. 5.  Patient will perform all aspects of toileting with supervision/set-up. Prior Level of Function: Patient was independent with self-care and functional mobility PTA. Outcome: Progressing Towards Goal     OCCUPATIONAL THERAPY EVALUATION    Patient: Ward Rousseau (47 y.o. male)  Date: 4/28/2021  Primary Diagnosis: Closed fracture of right tibia and fibula [S82.201A, S82.401A]  Procedure(s) (LRB):  RIGHT TIBIA INSERTION INTRA MEDULLARY NAIL/ SYNTHES/ FLAT MOHAN TABLE/ TIBIA TRIANGLE (Right) 1 Day Post-Op   Precautions:  Fall, Seizure, TTWB(RLE)    ASSESSMENT :  Patient cleared to participate in OT evaluation by RN. Upon entering the room, patient was supine in bed, alert, and declining to participate in skilled OT evaluation. RN,  and MD present as patient s/p fall out of bed approx. 7 minutes prior to OT entering. Patient with maximal encouragement and education needed for OOB activity. Patient educated role of OT, toe touch weight-bearing status, importance of elevating RLE, and safety around the house/during this admission. During education, PT entering room to maximize patient safety, participation, and functional mobility in preparation for self-care tasks. Patient finally agreeable to participate once but \"not getting up again until cab comes\".  Patient stand by assist for supine to sit additional time needed, contact guard assist for scooting towards EOB, min assist for sit > stand, contact guard - min assist for functional mobility using RW, moderate assist for lower body dressing 2/2 decreased sitting balance and contact guard assist for stand to sit and sit to supine. Patient able to maintain TTWB this session but unsafe for any standing ADLs, functional transfers, or functional mobility in room without assistance as patient impulsive, with RW out of base of support and ignoring safety commands. Based on the objective data described below, the patient presents with decreased strength, decreased independence, decreased safety awareness, decreased functional balance, and decreased functional mobility, which impedes pt performance in basic self-care/ADL tasks. Patient would benefit from skilled OT to restore PLOF and maximize function. Patient will benefit from skilled intervention to address the above impairments.   Patient's rehabilitation potential is considered to be Fair  Factors which may influence rehabilitation potential include:   []             None noted  [x]             Mental ability/status  [x]             Medical condition  [x]             Home/family situation and support systems  [x]             Safety awareness  [x]             Pain tolerance/management  []             Other:      PLAN :  Recommendations and Planned Interventions:   [x]               Self Care Training                  [x]      Therapeutic Activities  [x]               Functional Mobility Training   []      Cognitive Retraining  [x]               Therapeutic Exercises           [x]      Endurance Activities  [x]               Balance Training                    [x]      Neuromuscular Re-Education  []               Visual/Perceptual Training     [x]      Home Safety Training  [x]               Patient Education                   [x]      Family Training/Education  []               Other (comment):    Frequency/Duration: Patient will be followed by occupational therapy 1-2 times per day/4-7 days per week to address goals. Discharge Recommendations: Rehab  Further Equipment Recommendations for Discharge: bedside commode, transfer bench, rolling walker, and wheelchair      SUBJECTIVE:   Patient stated Fabian Ruth already told yall, im not telling yall again! in response to pain s/p fall    OBJECTIVE DATA SUMMARY:     Past Medical History:   Diagnosis Date    Diabetes ([facility] [address]     ETOH abuse     Hepatitis C     Hypertension     Seizures ([facility] [address]     last seizure few weeks ago (12/8/14), gets them periodically     Past Surgical History:   Procedure Laterality Date    HX ORTHOPAEDIC Left     foot operation     Barriers to Learning/Limitations: None  Compensate with: visual, verbal, tactile, kinesthetic cues/model    Home Situation:   Home Situation  Home Environment: Private residence  # Steps to Enter: 0  Wheelchair Ramp: No  One/Two Story Residence: Two story  # of Interior Steps: 12  Lift Chair Available: No  Living Alone: No  Support Systems: Spouse/Significant Other/Partner(spouse in room, other renters in other rooms )  Patient Expects to be Discharged to[de-identified] Independent living facility  Current DME Used/Available at Home: None  Tub or Shower Type: Tub/Shower combination  [x]  Right hand dominant   []  Left hand dominant    Cognitive/Behavioral Status:  Neurologic State: Alert  Orientation Level: Oriented X4(increased frustration with questions)  Cognition: Decreased command following;Decreased attention/concentration;Poor safety awareness  Safety/Judgement: Fall prevention;Lack of insight into deficits; Decreased awareness of need for safety;Decreased awareness of need for assistance;Decreased awareness of environment    Skin: Intact  Edema: None noted    Vision/Perceptual:    Acuity: Within Defined Limits      Coordination: BUE  Coordination: Generally decreased, functional  Fine Motor Skills-Upper: Left Intact; Right Intact    Gross Motor Skills-Upper: Left Intact; Right Intact    Balance:  Sitting: Impaired  Sitting - Static: Fair (occasional)  Sitting - Dynamic: Poor (constant support)(during lower body dressing)  Standing: Impaired; With support  Standing - Static: Fair  Standing - Dynamic : (F-)    Strength: BUE    Strength: Generally decreased, functional                Tone & Sensation: BUE    Tone: Normal  Sensation: Intact                      Range of Motion: BUE    AROM: Within functional limits                         Functional Mobility and Transfers for ADLs:  Bed Mobility:  Supine to Sit: Stand-by assistance; Additional time  Sit to Supine: Contact guard assistance; Additional time  Scooting: Contact guard assistance    Transfers:  Sit to Stand: Minimum assistance  Stand to Sit: Contact guard assistance    ADL Assessment:   Feeding: Setup    Oral Facial Hygiene/Grooming: Setup    Bathing: Minimum assistance; Moderate assistance    Upper Body Dressing: Stand-by assistance    Lower Body Dressing: Moderate assistance    Toileting: Moderate assistance                ADL Intervention:                      Upper Body Dressing Assistance  Dressing Assistance: Stand-by assistance  Hospital Gown: Stand-by assistance  Shirt simulation with hospital gown: Stand-by assistance    Lower Body Dressing Assistance  Dressing Assistance: Moderate assistance(pt perform  LBD w LLE, F- balance)  Socks: Moderate assistance(F- balance reaching to socks)  Leg Crossed Method Used: Yes  Position Performed: Seated edge of bed         Cognitive Retraining  Safety/Judgement: Fall prevention;Lack of insight into deficits; Decreased awareness of need for safety;Decreased awareness of need for assistance;Decreased awareness of environment    Pain:  Patient declined to answer pain questions  Pain Intervention(s): Medication (see MAR);    Response to intervention: Nurse notified, See doc flow    Activity Tolerance:   Fair    Please refer to the flowsheet for vital signs taken during this treatment. After treatment:   [] Patient left in no apparent distress sitting up in chair  [x] Patient left in no apparent distress in bed  [x] Call bell left within reach  [x] Nursing notified  [] Caregiver present  [x] Bed alarm activated    COMMUNICATION/EDUCATION:   [x] Role of Occupational Therapy in the acute care setting  [x] Home safety education was provided and the patient/caregiver indicated understanding. [x] Patient/family have participated as able in goal setting and plan of care. [x] Patient/family agree to work toward stated goals and plan of care. [] Patient understands intent and goals of therapy, but is neutral about his/her participation. [] Patient is unable to participate in goal setting and plan of care. Thank you for this referral.  Larry Alfonso OTR/L  Time Calculation: 24 mins    Eval Complexity: History: MEDIUM Complexity : Expanded review of history including physical, cognitive and psychosocial  history ; Examination: MEDIUM Complexity : 3-5 performance deficits relating to physical, cognitive , or psychosocial skils that result in activity limitations and / or participation restrictions; Decision Making:MEDIUM Complexity : Patient may present with comorbidities that affect occupational performnce.  Miniml to moderate modification of tasks or assistance (eg, physical or verbal ) with assesment(s) is necessary to enable patient to complete evaluation

## 2021-04-28 NOTE — PROGRESS NOTES
RESPIRATORY CARE ASSESSMENT FOR BRONCHIAL HYGIENE OR LUNG EXPANSION THERAPY  Patient  Frank Gallego     59 y.o.   male     4/28/2021  5:06 PM  Patient Active Problem List   Diagnosis Code    Closed fracture of right tibia and fibula S82.201A, S82.401A    Hyponatremia E87.1    Alcohol abuse F10.10       ABG:  Date:4/28/2021  No results found for: PH, PHI, PCO2, PCO2I, PO2, PO2I, HCO3, HCO3I, FIO2, FIO2I    Chest X-ray:  Date:4/28/2021  Results from Hospital Encounter encounter on 04/27/21   XR TIB/FIB RT    Narrative EXAM: TIB/FIB TWO VIEWS RIGHT 1631 hours    CLINICAL HISTORY/INDICATION:   RIGHT TIBIAL NAIL , displaced fracture through  the mid tibial diaphysis    COMPARISON: Tibia right or/27/21 at 0147 hours and 0643 hours. TECHNIQUE: Two views obtained. FINDINGS:    A long intramedullary nail extends from the proximal tibial metaphysis to the  distal diametaphysis. Proximal and distal increasing screws are demonstrated. There is no significant offset nor angulation at the mid tibial fracture site. The proximal tibial fracture site demonstrates no significant offset. The  proximal fibular fracture demonstrates no significant offset.       Impression Satisfactory post ORIF of the tibial fractures       Lab Test:  Date:4/28/2021  WBC:   Lab Results   Component Value Date/Time    WBC 6.4 04/28/2021 03:25 AM   HGB:   Lab Results   Component Value Date/Time    HGB 11.3 (L) 04/28/2021 03:25 AM    PLTS:   Lab Results   Component Value Date/Time    PLATELET 934 (L) 47/41/7580 03:25 AM       SaO2%/flow: RA   Vital Signs:     Patient Vitals for the past 8 hrs:   Temp Pulse Resp BP SpO2   04/28/21 1621 (!) 100.5 °F (38.1 °C) 91 16 123/86 95 %   04/28/21 1204 (!) 100.7 °F (38.2 °C) 91 16 120/75 99 %   04/28/21 1030 97 °F (36.1 °C) 90 16 127/71          RA/O2 flow/device:RA 95%      First Inital Assesment:     []Yes []No   Reevaluation/Reassessment:    []Yes []No     CHART REVIEW   Points 0 X 1 X 2 X 3 X 4 X Points   Pulmonary History Smoking History (1) none  Recent Smoking History <1 PPD  Recent Smoking History >1 PPD  Pulmonary Disease or Impairment  Severe Pulmonary Disease  2   Surgical History No Surgery  General Surgery  Lower Abdominal  Thoracic or Upper Abdominal  Thoracic & Pulmonary Disease  0   CXR Clear or not indicated  Chronic changes or CXR Pending  Infiltrate, atelectasis or pleural effusions  Infiltrates in more than 1lobe  Infiltrates +atelectasis  +/or pleural effusions  0     PATIENT ASSESSMENT    0 X 1 X 2 X 3 X 4 X Points   Respiratory Pattern Regular pattern RR 12-20  Increased RR 21-27   Mild Dyspnea at rest, irregular pattern RR 28-32  Moderate Dyspnea at rest, Use of accessory muscles, RR 33-36  Severe Dyspnea, Use of accessory muscles RR >36  0   Mental Status Alert Oriented cooperative  Confused, Follows commands  Lethargic, Does not follow commands  Obtunded  Unresponsive  0   Breath Sounds Clear  Decreased Unilaterally  Decreased Bilaterally  Mild Scattered wheezing or Crackles in bases  Severe Wheezing or rhonchi  0   Cough Strong dry NPC  Strong Productive  Weak NPC  Weak productive or weak with rhonchi  No cough or may require suctioning  0   Level of Activity Ambulatory  Ambulatory with assistance  Temporarily Non-ambulatory  Non-Ambulatory, able to position self  Unable to position self, confined to bed  1   Total Points/Score:   3         Bronchial Hygiene/Secretion Clearance:    []EZPAP []Rotation bed with vibration    []CPT with percussor []CPT via vest   []Oscillastiang positive pressure expiratory device      Lung Expansion:    []Incentive Spirometer w/RT visits [x]Incentive Spirometer w/nursing    []EZPAP [] Metaneb     *Suctioning:    []Nasal Tracheal []Tracheal     *suctioning will be ordered and done PRN with an associated frequency such as QID/PRN based on score       Other:    Care Plan   Level # Score Modality Frequency Comment   Level 1 >17      Level 2 14-17      Level 3 10-13 Incentive spirometer q4h w/a    Level 4 1-9        BRONCHIAL HYGIENE SCORING AND FREQUENCY GUIDELINES   Frequency Indications/Findings Level #   Q4 ATC Copious secretions, SOB, unable to sleep 1   QID & PRN at night Moderate amounts of secretions 2   TID or Q6 wa Small amounts of secretions and poor cough: recent history of secretions 3   BID or Q8 wa Unable to deep breathe and cough effectively 4       Respiratory Therapist: Jadyn Weinberg, RT

## 2021-04-28 NOTE — ROUTINE PROCESS
Pt wife's phone number is [phone]. If unable to reach wife the sister in law is [phone] in event of emergency only.

## 2021-04-28 NOTE — PROGRESS NOTES
[facility]    Progress Note      Patient: Giovana Bishop               Sex: male          DOA: 4/27/2021         YOB: 1960      Age:  59 y.o.        LOS:  LOS: 1 day         S/P  Procedure(s):  RIGHT TIBIA INSERTION INTRA MEDULLARY NAIL/ SYNTHES/ FLAT MOHAN TABLE/ TIBIA TRIANGLE               Subjective:     No complaints Tolerating diet Ambulating Voiding q shift. He is doing well this morning. His pain is managed on current medications and he understands he is TTWB RLE. He would like to go home today but we talked about PT needing to work with him some since he has WB restrictions. Denies cp, sob, abd pain   Objective:      Blood pressure 95/60, pulse 88, temperature 99.6 °F (37.6 °C), resp. rate 13, height 6' 2\" (1.88 m), weight 155 lb (70.3 kg), SpO2 96 %. Well developed, Well Nourished alert, cooperative, no distress  Incision clean, dry, no drainage, dressing in place  swelling and tenderness noted in right lower extremity  Sensory is intact   Motor is intact  nv intact  2+distal pulses  Neg calf tenderness  Neg for facial asymmetry     Labs:  CBC  @  CBC:   Lab Results   Component Value Date/Time    WBC 6.4 04/28/2021 03:25 AM    RBC 3.62 (L) 04/28/2021 03:25 AM    HGB 11.3 (L) 04/28/2021 03:25 AM    HCT 34.4 (L) 04/28/2021 03:25 AM    PLATELET 832 (L) 74/83/3769 03:25 AM     CMP:   Lab Results   Component Value Date/Time    Glucose 99 04/28/2021 03:25 AM    Sodium 131 (L) 04/28/2021 03:25 AM    Potassium 4.5 04/28/2021 03:25 AM    Chloride 98 (L) 04/28/2021 03:25 AM    CO2 30 04/28/2021 03:25 AM    BUN 9 04/28/2021 03:25 AM    Creatinine 0.92 04/28/2021 03:25 AM    Calcium 8.3 (L) 04/28/2021 03:25 AM    Anion gap 3 04/28/2021 03:25 AM    BUN/Creatinine ratio 10 (L) 04/28/2021 03:25 AM    Alk.  phosphatase 78 04/28/2021 03:25 AM    Protein, total 7.2 04/28/2021 03:25 AM    Albumin 3.1 (L) 04/28/2021 03:25 AM    Globulin 4.1 (H) 04/28/2021 03:25 AM    A-G Ratio 0.8 04/28/2021 03:25 AM   @  Coagulation  Lab Results   Component Value Date    INR 1.1 04/27/2021    APTT 29.1 05/11/2011      Basic Metabolic Profile  Lab Results   Component Value Date     (L) 04/28/2021    CO2 30 04/28/2021    BUN 9 04/28/2021       Medications Reviewed      Assessment/Plan     Principal Problem:    Closed fracture of right tibia and fibula (4/27/2021)    Active Problems:    Hyponatremia (4/27/2021)      Alcohol abuse (4/27/2021)        Procedure(s):  RIGHT TIBIA INSERTION INTRA MEDULLARY NAIL/ SYNTHES/ FLAT MOHAN TABLE/ TIBIA TRIANGLE :     1. PT and/or OT Consults: YES continue PT/OT: oob to chair, splint in place at all times, TTWB RLE                                               2. Incision Care:  Routine Incision Care and Dressing Changes as necessary: NO dressing changes at this time  3. Pain control  4. DVT Prophylaxis - SCD in place, lovenox 40 mg  5. Medications in chart for discharge. 6. Will place him in a boot tomorrow. DISCHARGE PLANNING     Intervention : Home with H/H likely but will wait for PT evaluation and recommendation.         [facility]  [phone]

## 2021-04-28 NOTE — PROGRESS NOTES
Cardiology Progress Note    Admit Date: 4/27/2021  Attending Cardiologist: Dr. Sapphire Denis:     Hospital Problems  Date Reviewed: 4/27/2021          Codes Class Noted POA    * (Principal) Closed fracture of right tibia and fibula ICD-10-CM: S82.201A, S82.401A  ICD-9-CM: 823.82  4/27/2021 Yes        Hyponatremia ICD-10-CM: E87.1  ICD-9-CM: 276.1  4/27/2021 Yes        Alcohol abuse ICD-10-CM: F10.10  ICD-9-CM: 305.00  4/27/2021 Yes              -Closed fracture of right tibia and fibula following mechanical fall. Now s/p surgical repair 4/27/2021.  -Abnormal ECG with nonspecific ST wave abnormalities. No cardiac complaints. Echo with normal EF 55-60% with normal wall motion, probably trileaflet aortic valve. -Hypertension. Stable. On HCTZ as outpatient.  -Diabetes mellitus. On Metformin as outpatient.  -Hyponatremia.  -Hypokalemia.  -Seizure disorder. -ETOH abuse.  -Tobacco abuse.     No primary cardiologist.    Plan:     Patient now s/p surgical repair. Appears to have tolerated procedure well. Echo yesterday unremarkable. Hemodynamics stable. Temp 100.7 noted. Continue routine post operative care. No further cardiac work up. Subjective:     Patient sleeping comfortably. Denies CP or SOB.     Objective:      Patient Vitals for the past 8 hrs:   Temp Pulse Resp BP SpO2   04/28/21 1204 (!) 100.7 °F (38.2 °C) 91 16 120/75 99 %   04/28/21 1030 97 °F (36.1 °C) 90 16 127/71    04/28/21 0714 99.6 °F (37.6 °C) 88 13 95/60 96 %         Patient Vitals for the past 96 hrs:   Weight   04/27/21 1447 155 lb (70.3 kg)   04/27/21 1212 144 lb (65.3 kg)       TELE: not on tele               Current Facility-Administered Medications   Medication Dose Route Frequency Last Admin    sodium chloride (NS) flush 5-40 mL  5-40 mL IntraVENous Q8H 10 mL at 04/28/21 0514    sodium chloride (NS) flush 5-40 mL  5-40 mL IntraVENous PRN      acetaminophen (TYLENOL) tablet 650 mg  650 mg Oral Q6H PRN      Or    acetaminophen (TYLENOL) suppository 650 mg  650 mg Rectal Q6H PRN      insulin lispro (HUMALOG) injection   SubCUTAneous Q6H Stopped at 04/28/21 0000    glucose chewable tablet 16 g  4 Tab Oral PRN      glucagon (GLUCAGEN) injection 1 mg  1 mg IntraMUSCular PRN      dextrose (D50W) injection syrg 12.5-25 g  25-50 mL IntraVENous PRN      ondansetron (ZOFRAN) injection 4 mg  4 mg IntraVENous Q4H PRN      naloxone (NARCAN) injection 0.4 mg  0.4 mg IntraVENous EVERY 2 MINUTES AS NEEDED      albuterol-ipratropium (DUO-NEB) 2.5 MG-0.5 MG/3 ML  3 mL Nebulization Q4H PRN      melatonin tablet 12 mg  12 mg Oral QHS PRN      docusate sodium (COLACE) capsule 100 mg  100 mg Oral BID PRN      nicotine (NICODERM CQ) 14 mg/24 hr patch 1 Patch  1 Patch TransDERmal DAILY PRN      PHENobarbitaL (LUMINAL) tablet 64.8 mg  64.8 mg Oral BID 64.8 mg at 04/28/21 0935    phenytoin ER (DILANTIN ER) ER capsule 300 mg  300 mg Oral DAILY 300 mg at 04/28/21 0935    phenytoin ER (DILANTIN ER) ER capsule 200 mg  200 mg Oral  mg at 40/09/21 7735    folic acid (FOLVITE) tablet 1 mg  1 mg Oral DAILY 1 mg at 04/28/21 0935    thiamine HCL (B-1) tablet 100 mg  100 mg Oral DAILY 100 mg at 04/28/21 0935    therapeutic multivitamin (THERAGRAN) tablet 1 Tab  1 Tab Oral DAILY 1 Tab at 04/28/21 0935    sodium chloride (NS) flush 5-40 mL  5-40 mL IntraVENous Q8H 20 mL at 04/28/21 0514    sodium chloride (NS) flush 5-40 mL  5-40 mL IntraVENous PRN      LORazepam (ATIVAN) tablet 1 mg  1 mg Oral Q1H PRN 1 mg at 04/28/21 0941    Or    LORazepam (ATIVAN) injection 1 mg  1 mg IntraVENous Q1H PRN      LORazepam (ATIVAN) tablet 2 mg  2 mg Oral Q1H PRN      Or    LORazepam (ATIVAN) injection 2 mg  2 mg IntraVENous Q1H PRN 2 mg at 04/28/21 1230    LORazepam (ATIVAN) injection 3 mg  3 mg IntraVENous Q15MIN PRN      oxyCODONE IR (ROXICODONE) tablet 5 mg  5 mg Oral Q4H PRN      oxyCODONE IR (ROXICODONE) tablet 10 mg  10 mg Oral Q4H PRN 10 mg at 04/28/21 0935    enoxaparin (LOVENOX) injection 40 mg  40 mg SubCUTAneous DAILY 40 mg at 04/28/21 0941    morphine injection 2 mg  2 mg IntraVENous Q3H PRN           Intake/Output Summary (Last 24 hours) at 4/28/2021 1336  Last data filed at 4/28/2021 0057  Gross per 24 hour   Intake 2160 ml   Output 1000 ml   Net 1160 ml       Physical Exam:  General:  alert, cooperative, no distress, appears stated age  Neck:  no JVD  Lungs:  clear to auscultation bilaterally  Heart:  regular rate and rhythm  Abdomen:  abdomen is soft without significant tenderness, masses, organomegaly or guarding  Extremities:  extremities normal, atraumatic, no cyanosis or edema. Right leg ace bandage present.     Visit Vitals  /75 (BP 1 Location: Left upper arm, BP Patient Position: At rest)   Pulse 91   Temp (!) 100.7 °F (38.2 °C)   Resp 16   Ht 6' 2\" (1.88 m)   Wt 155 lb (70.3 kg)   SpO2 99%   BMI 19.90 kg/m²       Data Review:     Labs: Results:       Chemistry Recent Labs     04/28/21 0325 04/27/21  0914 04/27/21  0123   GLU 99 78 80   * 130* 128*   K 4.5 3.7 3.2*   CL 98* 93* 92*   CO2 30 30 26   BUN 9 9 6*   CREA 0.92 0.71 0.78   CA 8.3* 8.8 9.0   AGAP 3 7 10   BUCR 10* 13 8*   AP 78 87 101   TP 7.2 8.2 9.5*   ALB 3.1* 3.5 4.0   GLOB 4.1* 4.7* 5.5*   AGRAT 0.8 0.7* 0.7*      CBC w/Diff Recent Labs     04/28/21 0325 04/27/21  0914 04/27/21  0123   WBC 6.4 8.5 7.6   RBC 3.62* 4.11* 4.58   HGB 11.3* 13.0 14.3   HCT 34.4* 38.8 43.5   * 123* 148   GRANS 69 78* 65   LYMPH 19* 15* 26   EOS 0 1 1      Cardiac Enzymes No results found for: CPK, CK, CKMMB, CKMB, RCK3, CKMBT, CKNDX, CKND1, LATRICIA, TROPT, TROIQ, SHEA, TROPT, TNIPOC, BNP, BNPP   Coagulation Recent Labs     04/27/21  0914   PTP 14.3   INR 1.1       Lipid Panel No results found for: CHOL, CHOLPOCT, CHOLX, CHLST, CHOLV, 601034, HDL, HDLP, LDL, LDLC, DLDLP, 671773, VLDLC, VLDL, TGLX, TRIGL, TRIGP, TGLPOCT, CHHD, CHHDX   BNP No results found for: BNP, BNPP, XBNPT   Liver Enzymes Recent Labs     04/28/21  0325   TP 7.2   ALB 3.1*   AP 78      Thyroid Studies Lab Results   Component Value Date/Time    TSH 0.46 (L) 08/05/2010 03:18 PM          Signed By: Holli Cogan, PA     April 28, 2021

## 2021-04-28 NOTE — ANESTHESIA POSTPROCEDURE EVALUATION
Procedure(s):  RIGHT TIBIA INSERTION INTRA MEDULLARY NAIL/ SYNTHES/ FLAT MOHAN TABLE/ TIBIA TRIANGLE.     general    Anesthesia Post Evaluation      Multimodal analgesia: multimodal analgesia used between 6 hours prior to anesthesia start to PACU discharge  Patient location during evaluation: bedside  Patient participation: complete - patient participated  Level of consciousness: awake  Pain management: adequate  Airway patency: patent  Anesthetic complications: no  Cardiovascular status: stable  Respiratory status: acceptable  Hydration status: acceptable  Post anesthesia nausea and vomiting:  controlled      INITIAL Post-op Vital signs:   Vitals Value Taken Time   /92 04/27/21 1840   Temp 36.4 °C (97.5 °F) 04/27/21 1840   Pulse 72 04/27/21 1840   Resp 18 04/27/21 1840   SpO2 100 % 04/27/21 1840

## 2021-04-28 NOTE — ROUTINE PROCESS
Bedside shift change report given to Frieda (oncoming nurse) by Dinorah Botello (offgoing nurse). Report included the following information SBAR, Kardex, Intake/Output, MAR and Recent Results.

## 2021-04-28 NOTE — PROGRESS NOTES
Problem: Mobility Impaired (Adult and Pediatric)  Goal: *Acute Goals and Plan of Care (Insert Text)  Description: Physical Therapy Goals  Initiated 4/28/2021 and to be accomplished within 7 day(s)  1. Patient will move from supine to sit and sit to supine , scoot up and down, and roll side to side in bed with modified independence. 2.  Patient will transfer from bed to chair and chair to bed with modified independence using the least restrictive device. 3.  Patient will perform sit to stand with moderate assistance . 4. Patient will ambulate with moderate assistance  for 10 feet with the least restrictive device. PLOF: Pt reporting he was independent, living in home, renting room with other renters. Has wife in room with him. 1 bathroom upstairs and 1 bathroom downstairs. Pt reporting he only goes upstairs to [employer]\". 4/28/2021 1452 by Satya Hood PT  Outcome: Progressing Towards Goal  PHYSICAL THERAPY TREATMENT    Patient: Lala Sarmiento (36 y.o. male)  Date: 4/28/2021  Diagnosis: Closed fracture of right tibia and fibula [S82.201A, S82.401A] Closed fracture of right tibia and fibula  Procedure(s) (LRB):  RIGHT TIBIA INSERTION INTRA MEDULLARY NAIL/ SYNTHES/ FLAT MOHAN TABLE/ TIBIA TRIANGLE (Right) 1 Day Post-Op  Precautions: Fall, Seizure, TTWB(RLE)  PLOF: see above     ASSESSMENT:  Pt cleared to participate in PT session, pt received semi-reclined in bed and agreeable to therapy session. Completing with OT to maximize safety and mobility. Pt had just fallen out of bed, found on floor and bed alarm going off. Pt completing supine to sit with Fiorella for RLE management. Pt sitting on EOB, able to verbalize weight bearing precautions. Pt agreeable to stand this session. Standing with Fiorella and adhering to weight bearing throughout.  Pt ambulating x20 feet in room, becoming upset when he realized he wasn't walking out of the hospital. Pt educated on need for continued medical attention at this time. Pt reporting \" you tricked me, that's not right\". Pt with poor safety awareness and nonacceptance of education. Pt returned to supine with Fiorella. Pt positioned for comfort and educated to call for assist before getting up, pt verbalized understanding. Pt left with all needs met and call bell in reach. RN notified of position and participation. Bed alarm activated. Progression toward goals:   []      Improving appropriately and progressing toward goals  [x]      Improving slowly and progressing toward goals  []      Not making progress toward goals and plan of care will be adjusted     PLAN:  Patient continues to benefit from skilled intervention to address the above impairments. Continue treatment per established plan of care. Discharge Recommendations:  Izaiah Ibanez  Further Equipment Recommendations for Discharge:  rolling walker, wheelchair for distance      SUBJECTIVE:   Patient stated you guys ask a lot of questions.     OBJECTIVE DATA SUMMARY:   Critical Behavior:  Neurologic State: Alert  Orientation Level: Oriented X4(increased frustration with questions)  Cognition: Decreased command following, Decreased attention/concentration, Poor safety awareness  Safety/Judgement: Fall prevention, Lack of insight into deficits, Decreased awareness of need for safety, Decreased awareness of need for assistance, Decreased awareness of environment  Functional Mobility Training:  Bed Mobility:  Supine to Sit: Stand-by assistance; Additional time  Sit to Supine: Contact guard assistance; Additional time  Scooting: Contact guard assistance    Transfers:  Sit to Stand: Minimum assistance  Stand to Sit: Contact guard assistance    Balance:  Sitting: Impaired  Sitting - Static: Fair (occasional)  Sitting - Dynamic: Poor (constant support)(during lower body dressing)  Standing: Impaired; With support  Standing - Static: Fair  Standing - Dynamic : (F-)   Range Of Motion:   AROM: Within functional limits     Posture:   Posture (WDL): Exceptions to WDL   Posture Assessment: Forward head;Trunk flexion    Ambulation/Gait Training:  Distance (ft): 20 Feet (ft)  Assistive Device: Walker, rolling  Ambulation - Level of Assistance: Minimal assistance     Gait Description (WDL): Exceptions to WDL  Gait Abnormalities: Decreased step clearance; Step to gait  Right Side Weight Bearing: Toe touch     Base of Support: Center of gravity altered;Shift to left  Stance: Weight shift  Speed/Rebecca: Shuffled; Slow  Step Length: Left shortened    Pain:  Pain level pre-treatment: 0/10  Pain level post-treatment: 0/10       Activity Tolerance:   Fair activity tolerance     Please refer to the flowsheet for vital signs taken during this treatment. After treatment:   [] Patient left in no apparent distress sitting up in chair  [x] Patient left in no apparent distress in bed  [x] Call bell left within reach  [x] Nursing notified  [] Caregiver present  [x] Bed alarm activated  [] SCDs applied      COMMUNICATION/EDUCATION:   [x]         Role of Physical Therapy in the acute care setting. []         Fall prevention education was provided and the patient/caregiver indicated understanding. []         Patient/family have participated as able in working toward goals and plan of care. []         Patient/family agree to work toward stated goals and plan of care. [x]         Patient understands intent and goals of therapy, but is neutral about his/her participation.   []         Patient is unable to participate in stated goals/plan of care: ongoing with therapy staff.  []         Other:        Reymundo Paniagua, PT   Time Calculation: 13 mins

## 2021-04-28 NOTE — PROGRESS NOTES
conducted an initial consultation and Spiritual Assessment for Jeimy Hernandez, who is a 59 y.o.,male. Patient's Primary Language is: [place]. According to the patient's EMR [facility] Affiliation is: [facility].     The reason the Patient came to the hospital is:   Patient Active Problem List    Diagnosis Date Noted    Closed fracture of right tibia and fibula 04/27/2021    Hyponatremia 04/27/2021    Alcohol abuse 04/27/2021        The  provided the following Interventions:  Initiated a relationship of care and support. Listened empathically. The following outcomes where achieved:  Patient processed feeling about current hospitalization. Patient expressed gratitude for 's visit. Assessment:  Patient does not have any Uatsdin/cultural needs that will affect patient's preferences in health care. There are no spiritual or Uatsdin issues which require intervention at this time. Plan:  Chaplains will continue to follow and will provide pastoral care on an as needed/requested basis.  recommends bedside caregivers page  on duty if patient shows signs of acute spiritual or emotional distress.     [address] [place] [address]   [phone]

## 2021-04-28 NOTE — PROGRESS NOTES
Received pt alert and resting quietly in bed. Ace wrap dry and intact to right leg. Pt assisted with using urinal. Obtained a healthy choice meal and pt consumed it without any difficulty. Voiding in urinal. Denies any pain at this time. Will continue to monitor.

## 2021-04-28 NOTE — PROGRESS NOTES
Problem: Falls - Risk of  Goal: *Absence of Falls  Description: Document Erica Alfredo Fall Risk and appropriate interventions in the flowsheet.   Outcome: Progressing Towards Goal  Note: Fall Risk Interventions:  Mobility Interventions: Bed/chair exit alarm, Patient to call before getting OOB         Medication Interventions: Bed/chair exit alarm, Patient to call before getting OOB    Elimination Interventions: Bed/chair exit alarm, Call light in reach, Patient to call for help with toileting needs    History of Falls Interventions: Bed/chair exit alarm, Door open when patient unattended         Problem: Patient Education: Go to Patient Education Activity  Goal: Patient/Family Education  Outcome: Progressing Towards Goal     Problem: Patient Education: Go to Patient Education Activity  Goal: Patient/Family Education  Outcome: Progressing Towards Goal

## 2021-04-28 NOTE — PROGRESS NOTES
Problem: Mobility Impaired (Adult and Pediatric)  Goal: *Acute Goals and Plan of Care (Insert Text)  Description: Physical Therapy Goals  Initiated 4/28/2021 and to be accomplished within 7 day(s)  1. Patient will move from supine to sit and sit to supine , scoot up and down, and roll side to side in bed with modified independence. 2.  Patient will transfer from bed to chair and chair to bed with modified independence using the least restrictive device. 3.  Patient will perform sit to stand with moderate assistance . 4. Patient will ambulate with moderate assistance  for 10 feet with the least restrictive device. PLOF: Pt reporting he was independent, living in home, renting room with other renters. Has wife in room with him. 1 bathroom upstairs and 1 bathroom downstairs. Pt reporting he only goes upstairs to [employer]". Outcome: Progressing Towards Goal    PHYSICAL THERAPY EVALUATION    Patient: Gwen Huynh (4 y.o. male)  Date: 4/28/2021  Primary Diagnosis: Closed fracture of right tibia and fibula [S82.201A, S82.401A]  Procedure(s) (LRB):  RIGHT TIBIA INSERTION INTRA MEDULLARY NAIL/ SYNTHES/ FLAT MOHAN TABLE/ TIBIA TRIANGLE (Right) 1 Day Post-Op   Precautions:   Seizure, TTWB(RLE )  Toe touch, RLE    PLOF: see above     ASSESSMENT :  Pt cleared to participate in PT session, pt received semi-reclined in bed and agreeable to therapy session. Based on the objective data described below, the patient presents with decreased endurance, decreased strength, decreased balance reactions, gait deviations, confusion, increased pain, and decreased independence in functional mobility. Pt perseverating on going home throughout session, discussed role of PT for safe discharge and proper equipment. Pt with RLE bandage, educated on weight bearing restrictions and pt verbalized understanding. Pt completing supine to sit with HOB elevated with supervision and increased time.  Pt sitting on EOB with intermittent return to R sidelying and returning to sitting with supervision. Pt attempting to use momentum to stand but then unable and declining to attempt after 2 attempts. Pt declining assist from PT. Pt returned to supine with supervision. Scooting towards [facility] with supervision and rolling with Lisbeth. Pt positioned for comfort and educated to call for assist before getting up, pt verbalized understanding. Pt left with all needs met and call bell in reach. RN notified of position and participation. Pt would benefit from 2 person assist for OOB activities at this time. Will recommend SNF, RW and wheelchair for distance. Patient will benefit from skilled intervention to address the above impairments. Patient's rehabilitation potential is considered to be Fair  Factors which may influence rehabilitation potential include:   []         None noted  [x]         Mental ability/status  [x]         Medical condition  [x]         Home/family situation and support systems  [x]         Safety awareness  [x]         Pain tolerance/management  []         Other:      PLAN :  Recommendations and Planned Interventions:   [x]           Bed Mobility Training             []    Neuromuscular Re-Education  [x]           Transfer Training                   []    Orthotic/Prosthetic Training  [x]           Gait Training                          []    Modalities  [x]           Therapeutic Exercises           []    Edema Management/Control  [x]           Therapeutic Activities            [x]    Family Training/Education  [x]           Patient Education  []           Other (comment):    Frequency/Duration: Patient will be followed by physical therapy 1-2 times per day/4-7 days per week to address goals.   Discharge Recommendations: Skilled Nursing Facility  Further Equipment Recommendations for Discharge: rolling walker and wheelchair     SUBJECTIVE:   Patient stated Bairon Allison can call my stick man, it's no one you want to know.    OBJECTIVE DATA SUMMARY:     Past Medical History:   Diagnosis Date    Diabetes ([facility]     ETOH abuse     Hepatitis C     Hypertension     Seizures ([facility]     last seizure few weeks ago (12/8/14), gets them periodically     Past Surgical History:   Procedure Laterality Date    HX ORTHOPAEDIC Left     foot operation     Barriers to Learning/Limitations: yes;  cognitive, physical, and altered mental status (i.e.Sedation, Confusion)  Compensate with: Visual Cues, Verbal Cues, and Tactile Cues  Home Situation:  Home Situation  Home Environment: Private residence  # Steps to Enter: 0  Wheelchair Ramp: No  One/Two Story Residence: Two story  # of Interior Steps: 12  Lift Chair Available: No  Living Alone: No  Support Systems: Spouse/Significant Other/Partner(spouse in room, other renters in other rooms )  Patient Expects to be Discharged to[de-identified] Independent living facility  Current DME Used/Available at Home: None  Critical Behavior:  Neurologic State: Confused; Alert  Orientation Level: Oriented X4  Cognition: Decreased attention/concentration; Impaired decision making;Poor safety awareness  Safety/Judgement: Decreased awareness of environment;Decreased insight into deficits; Fall prevention  Psychosocial  Patient Behaviors: Calm    Strength:    Strength: Generally decreased, functional    Tone & Sensation:   Tone: Normal    Sensation: Intact    Range Of Motion:  AROM: Generally decreased, functional(BLEs )    Posture:  Posture (WDL): Exceptions to WDL  Posture Assessment: Forward head;Trunk flexion  Functional Mobility:  Bed Mobility:  Rolling: Supervision  Supine to Sit: Supervision  Sit to Supine: Supervision  Scooting: Supervision  Transfers:  Sit to Stand: (pt declining to truly attempt )    Balance:   Sitting: Impaired  Sitting - Static: Fair (occasional)  Sitting - Dynamic: Fair (occasional)      Ambulation/Gait Training:    Right Side Weight Bearing:  Toe touch    Pain:  Pain level pre-treatment: 10/10   Pain level post-treatment: 10/10   Pain Intervention(s) : Rest, Repositioning  Response to intervention: Nurse notified    Activity Tolerance:   Fair activity tolerance, limited participation for [address] [place] [address] Simple activities. Please refer to the flowsheet for vital signs taken during this treatment. After treatment:   []         Patient left in no apparent distress sitting up in chair  [x]         Patient left in no apparent distress in bed  [x]         Call bell left within reach  [x]         Nursing notified  []         Caregiver present  [x]         Bed alarm activated  []         SCDs applied    COMMUNICATION/EDUCATION:   [x]         Role of Physical Therapy in the acute care setting. [x]         Fall prevention education was provided and the patient/caregiver indicated understanding. [x]         Patient/family have participated as able in goal setting and plan of care. [x]         Patient/family agree to work toward stated goals and plan of care. []         Patient understands intent and goals of therapy, but is neutral about his/her participation. []         Patient is unable to participate in goal setting/plan of care: ongoing with therapy staff.  []         Other:     Thank you for this referral.  Venkat Devine, PT   Time Calculation: 25 mins      Eval Complexity: History: MEDIUM  Complexity : 1-2 comorbidities / personal factors will impact the outcome/ POC Exam:MEDIUM Complexity : 3 Standardized tests and measures addressing body structure, function, activity limitation and / or participation in recreation  Presentation: MEDIUM Complexity : Evolving with changing characteristics  Clinical Decision Making:Medium Complexity mod  Overall Complexity:MEDIUM

## 2021-04-28 NOTE — ROUTINE PROCESS
Pt attempted to get out of bed to go home. Explained to patient it was not safe to walk without assistance and he was not medically stable for discharge. Pt insisted that he was going to leave. He stated his \"stick man\" was going to come get him. I explained that I could not force him to stay and if his friend came and helped him out I could not legally prevent him from doing so since he was capable of making medical decisions. Pt became more agitated. Ciwa score of 10. Ativan given see MAR. 1030- Pt found sitting on floor. Pt stated \"I didn't fall. \" VS stable. Pt reporting no injury. A and O x 4. CIWA of 7 noted. MD present. 1230- Pt CIWA 22. Ativan given see MAR.   1330 and 1430- Pt resting quietly in bed with respirations greater than 12/minute. [phone]- MD at bedside. Pt disoriented and sleepy. Respirations greater than 12 breaths per minute. Incontinence care performed  1630- Pt resting quietly in bed with respirations greater than 12/minute. No apparent signs of distress. 1723- Pt attempting to get out of bed. Calling nurse his mother-in-law and saying he sees a \"fat chick and basketball. \" Attempting to drink from urinal and stating. \"I want a beer. \" Visible tremor noted. See CIWA. Ativan given. See MAR.   6559- Pt resting quietly. Drowsy from Ativan. Arouses with mild stimuli.

## 2021-04-28 NOTE — PROGRESS NOTES
Spoke with patient at bedside, he stated he was trying to find a ride home and was going to leave as soon as he found one. Discussed with patient what his plans were going home and he said he didn't know he just wanted to go home but, couldn't find a ride. Notified patient that if he cooperates with discharge planning and MD plan of treatment CM can assist with setting up home needs and transportation home. Patient stated he was okay with CM setting up home health but, patient refused any options that do not include him going home. Patient was agreeable with working with therapy to determine needs at home. PT/OT were at bedside with CM and able to work with patient. Notified MD Cornelio Mortensen and LEXUS Patino. Will continue to follow up.     11:15am Attempted to call [facility] at number provided [phone] but, no answer. Left  to call CM back. Also tried number patient provided CM that he is not sure if it is correct [phone] but, no answer. Left  to call CM back. Patient refused to give CM any additional phone numbers. Will continue to follow up.      Adrienne Ureña RN, BSN   Care Management  [phone]

## 2021-04-29 ENCOUNTER — HOME HEALTH ADMISSION (OUTPATIENT)
Dept: HOME HEALTH SERVICES | Facility: HOME HEALTH | Age: 61
End: 2021-04-29
Payer: MEDICAID

## 2021-04-29 LAB
ANION GAP SERPL CALC-SCNC: 6 MMOL/L (ref 3–18)
APPEARANCE UR: CLEAR
BASOPHILS # BLD: 0 K/UL (ref 0–0.1)
BASOPHILS NFR BLD: 0 % (ref 0–2)
BILIRUB UR QL: NEGATIVE
BUN SERPL-MCNC: 8 MG/DL (ref 7–18)
BUN/CREAT SERPL: 11 (ref 12–20)
CALCIUM SERPL-MCNC: 8.9 MG/DL (ref 8.5–10.1)
CHLORIDE SERPL-SCNC: 99 MMOL/L (ref 100–111)
CO2 SERPL-SCNC: 27 MMOL/L (ref 21–32)
COLOR UR: YELLOW
CREAT SERPL-MCNC: 0.73 MG/DL (ref 0.6–1.3)
DIFFERENTIAL METHOD BLD: ABNORMAL
EOSINOPHIL # BLD: 0 K/UL (ref 0–0.4)
EOSINOPHIL NFR BLD: 0 % (ref 0–5)
EPITH CASTS URNS QL MICRO: ABNORMAL /LPF (ref 0–5)
ERYTHROCYTE [DISTWIDTH] IN BLOOD BY AUTOMATED COUNT: 11.8 % (ref 11.6–14.5)
GLUCOSE BLD STRIP.AUTO-MCNC: 127 MG/DL (ref 70–110)
GLUCOSE BLD STRIP.AUTO-MCNC: 84 MG/DL (ref 70–110)
GLUCOSE BLD STRIP.AUTO-MCNC: 96 MG/DL (ref 70–110)
GLUCOSE SERPL-MCNC: 95 MG/DL (ref 74–99)
GLUCOSE UR STRIP.AUTO-MCNC: NEGATIVE MG/DL
HCT VFR BLD AUTO: 34.4 % (ref 36–48)
HGB BLD-MCNC: 11.2 G/DL (ref 13–16)
HGB UR QL STRIP: NEGATIVE
KETONES UR QL STRIP.AUTO: NEGATIVE MG/DL
LEUKOCYTE ESTERASE UR QL STRIP.AUTO: ABNORMAL
LYMPHOCYTES # BLD: 1.7 K/UL (ref 0.9–3.6)
LYMPHOCYTES NFR BLD: 23 % (ref 21–52)
MAGNESIUM SERPL-MCNC: 2 MG/DL (ref 1.6–2.6)
MCH RBC QN AUTO: 31.6 PG (ref 24–34)
MCHC RBC AUTO-ENTMCNC: 32.6 G/DL (ref 31–37)
MCV RBC AUTO: 97.2 FL (ref 74–97)
MONOCYTES # BLD: 1.1 K/UL (ref 0.05–1.2)
MONOCYTES NFR BLD: 14 % (ref 3–10)
NEUTS SEG # BLD: 4.6 K/UL (ref 1.8–8)
NEUTS SEG NFR BLD: 62 % (ref 40–73)
NITRITE UR QL STRIP.AUTO: NEGATIVE
PH UR STRIP: 7.5 [PH] (ref 5–8)
PHOSPHATE SERPL-MCNC: 2.9 MG/DL (ref 2.5–4.9)
PLATELET # BLD AUTO: 114 K/UL (ref 135–420)
PMV BLD AUTO: 11.4 FL (ref 9.2–11.8)
POTASSIUM SERPL-SCNC: 4 MMOL/L (ref 3.5–5.5)
PROT UR STRIP-MCNC: NEGATIVE MG/DL
RBC # BLD AUTO: 3.54 M/UL (ref 4.35–5.65)
SODIUM SERPL-SCNC: 132 MMOL/L (ref 136–145)
SP GR UR REFRACTOMETRY: 1.02 (ref 1–1.03)
UROBILINOGEN UR QL STRIP.AUTO: 1 EU/DL (ref 0.2–1)
WBC # BLD AUTO: 7.4 K/UL (ref 4.6–13.2)
WBC URNS QL MICRO: ABNORMAL /HPF (ref 0–5)

## 2021-04-29 PROCEDURE — 99232 SBSQ HOSP IP/OBS MODERATE 35: CPT | Performed by: HOSPITALIST

## 2021-04-29 PROCEDURE — 97116 GAIT TRAINING THERAPY: CPT

## 2021-04-29 PROCEDURE — 97535 SELF CARE MNGMENT TRAINING: CPT

## 2021-04-29 PROCEDURE — 80048 BASIC METABOLIC PNL TOTAL CA: CPT

## 2021-04-29 PROCEDURE — 74011250636 HC RX REV CODE- 250/636: Performed by: INTERNAL MEDICINE

## 2021-04-29 PROCEDURE — 74011250637 HC RX REV CODE- 250/637: Performed by: ORTHOPAEDIC SURGERY

## 2021-04-29 PROCEDURE — 85025 COMPLETE CBC W/AUTO DIFF WBC: CPT

## 2021-04-29 PROCEDURE — 74011000258 HC RX REV CODE- 258: Performed by: HOSPITALIST

## 2021-04-29 PROCEDURE — 82962 GLUCOSE BLOOD TEST: CPT

## 2021-04-29 PROCEDURE — 36415 COLL VENOUS BLD VENIPUNCTURE: CPT

## 2021-04-29 PROCEDURE — 74011250636 HC RX REV CODE- 250/636: Performed by: HOSPITALIST

## 2021-04-29 PROCEDURE — 74011250636 HC RX REV CODE- 250/636: Performed by: ORTHOPAEDIC SURGERY

## 2021-04-29 PROCEDURE — 74011250637 HC RX REV CODE- 250/637: Performed by: INTERNAL MEDICINE

## 2021-04-29 PROCEDURE — 84100 ASSAY OF PHOSPHORUS: CPT

## 2021-04-29 PROCEDURE — 99024 POSTOP FOLLOW-UP VISIT: CPT | Performed by: ORTHOPAEDIC SURGERY

## 2021-04-29 PROCEDURE — 83735 ASSAY OF MAGNESIUM: CPT

## 2021-04-29 PROCEDURE — 74011000250 HC RX REV CODE- 250: Performed by: HOSPITALIST

## 2021-04-29 PROCEDURE — 97530 THERAPEUTIC ACTIVITIES: CPT

## 2021-04-29 PROCEDURE — 2709999900 HC NON-CHARGEABLE SUPPLY

## 2021-04-29 PROCEDURE — 65270000029 HC RM PRIVATE

## 2021-04-29 RX ADMIN — THIAMINE HYDROCHLORIDE: 100 INJECTION, SOLUTION INTRAMUSCULAR; INTRAVENOUS at 22:01

## 2021-04-29 RX ADMIN — LORAZEPAM 1 MG: 2 INJECTION INTRAMUSCULAR; INTRAVENOUS at 11:34

## 2021-04-29 RX ADMIN — PHENOBARBITAL 64.8 MG: 64.8 TABLET ORAL at 09:48

## 2021-04-29 RX ADMIN — OXYCODONE HYDROCHLORIDE 10 MG: 5 TABLET ORAL at 12:00

## 2021-04-29 RX ADMIN — THERA TABS 1 TABLET: TAB at 09:48

## 2021-04-29 RX ADMIN — Medication 10 ML: at 14:00

## 2021-04-29 RX ADMIN — PHENYTOIN SODIUM 200 MG: 100 CAPSULE ORAL at 21:59

## 2021-04-29 RX ADMIN — PHENOBARBITAL 64.8 MG: 64.8 TABLET ORAL at 21:59

## 2021-04-29 RX ADMIN — ENOXAPARIN SODIUM 40 MG: 40 INJECTION SUBCUTANEOUS at 09:49

## 2021-04-29 RX ADMIN — THIAMINE HYDROCHLORIDE: 100 INJECTION, SOLUTION INTRAMUSCULAR; INTRAVENOUS at 02:24

## 2021-04-29 RX ADMIN — LORAZEPAM 2 MG: 2 INJECTION INTRAMUSCULAR; INTRAVENOUS at 16:51

## 2021-04-29 RX ADMIN — Medication 100 MG: at 09:49

## 2021-04-29 RX ADMIN — FOLIC ACID 1 MG: 1 TABLET ORAL at 09:48

## 2021-04-29 RX ADMIN — Medication 10 ML: at 22:02

## 2021-04-29 RX ADMIN — LORAZEPAM 1 MG: 2 INJECTION INTRAMUSCULAR; INTRAVENOUS at 15:28

## 2021-04-29 RX ADMIN — Medication 10 ML: at 05:39

## 2021-04-29 RX ADMIN — PHENYTOIN SODIUM 300 MG: 100 CAPSULE ORAL at 09:49

## 2021-04-29 NOTE — PROGRESS NOTES
[facility]    Progress Note      Patient: Jeimy Hernandez               Sex: male          DOA: 4/27/2021         YOB: 1960      Age:  59 y.o.        LOS:  LOS: 2 days         S/P  Procedure(s):  RIGHT TIBIA INSERTION INTRA MEDULLARY NAIL/ SYNTHES/ FLAT MOHAN TABLE/ TIBIA TRIANGLE               Subjective:     No complaints Tolerating diet Ambulating Voiding q shift. He is having some discomfort today but this is managed on current medications. He has participated in PT and understands he is only TTWB RLE. He wants to go home and not to a rehab center upon discharge. Denies cp, sob, abd pain   Objective:      Blood pressure 106/73, pulse 100, temperature 97.6 °F (36.4 °C), resp. rate 16, height 6' 2\" (1.88 m), weight 155 lb (70.3 kg), SpO2 98 %. Well developed, Well Nourished alert, cooperative, no distress, disoriented  Incision clean, dry, no drainage, dressing in place, boot in place  swelling and tenderness noted in right lower extremity  Sensory is intact   Motor is intact  nv intact  2+distal pulses  Neg calf tenderness  Neg for facial asymmetry     Labs:  CBC  @  CBC:   Lab Results   Component Value Date/Time    WBC 7.4 04/29/2021 01:45 AM    RBC 3.54 (L) 04/29/2021 01:45 AM    HGB 11.2 (L) 04/29/2021 01:45 AM    HCT 34.4 (L) 04/29/2021 01:45 AM    PLATELET 937 (L) 64/99/5398 01:45 AM     CMP:   Lab Results   Component Value Date/Time    Glucose 95 04/29/2021 01:45 AM    Sodium 132 (L) 04/29/2021 01:45 AM    Potassium 4.0 04/29/2021 01:45 AM    Chloride 99 (L) 04/29/2021 01:45 AM    CO2 27 04/29/2021 01:45 AM    BUN 8 04/29/2021 01:45 AM    Creatinine 0.73 04/29/2021 01:45 AM    Calcium 8.9 04/29/2021 01:45 AM    Anion gap 6 04/29/2021 01:45 AM    BUN/Creatinine ratio 11 (L) 04/29/2021 01:45 AM    Alk.  phosphatase 78 04/28/2021 03:25 AM    Protein, total 7.2 04/28/2021 03:25 AM    Albumin 3.1 (L) 04/28/2021 03:25 AM    Globulin 4.1 (H) 04/28/2021 03:25 AM    A-G Ratio 0.8 04/28/2021 03:25 AM   @  Coagulation  Lab Results   Component Value Date    INR 1.1 04/27/2021    APTT 29.1 05/11/2011      Basic Metabolic Profile  Lab Results   Component Value Date     (L) 04/29/2021    CO2 27 04/29/2021    BUN 8 04/29/2021       Medications Reviewed      Assessment/Plan     Principal Problem:    Closed fracture of right tibia and fibula (4/27/2021)    Active Problems:    Hyponatremia (4/27/2021)      Alcohol abuse (4/27/2021)        Procedure(s):  RIGHT TIBIA INSERTION INTRA MEDULLARY NAIL/ SYNTHES/ FLAT MOHAN TABLE/ TIBIA TRIANGLE :      1. PT and/or OT Consults: YES continue PT/OT: oob to chair, splint in place at all times, TTWB RLE                                               2. Incision Care:  Routine Incision Care and Dressing Changes as necessary: NO dressing changes at this time  3. Pain control  4. DVT Prophylaxis - SCD in place, lovenox 30 mg  5. Medications in chart for discharge. 6. Pt placed in boot today. Talked about WB restrictions in boot and that boot needs to be worn at all times except for hygiene.     DISCHARGE PLANNING      Intervention : rehab recommended but pt does not want to go to a rehab. Home with H/H being set up at this time.  Ok for discharge from  Rubi [facility], [facility]  [phone]

## 2021-04-29 NOTE — HOME CARE
1424 - Received home health referral for [facility] for PT, OT. Called patient's wife Maria Elena Patton at [phone] Spoke with Rut Siddiqi (sister-in-law) via phone patient identifier verified. Rut Siddiqi reported Maria Elena Patton (wife) is not available to answer the phone. Rut Siddiqi (sister-in-law) reported Maria Elena Patton (pt's wife) will not be able to care for the patient. [facility] notified. [address] with Maria Elena Patton (wife) via phone patient identifier verified. Maria Elena Patton (wife) is agreeable with [facility] PT and will be assisting patient at home after discharge. Explained home care services and routines. Demographics verified and address confirmed: [address] [place], [place] [id number]. Patient has the following DME: BSC, TTB, RW, WC through [employer]. Orders noted and arranged.     Anderson Mcelroy RN   Home Health Intake/Liaison

## 2021-04-29 NOTE — PROGRESS NOTES
Problem: Mobility Impaired (Adult and Pediatric)  Goal: *Acute Goals and Plan of Care (Insert Text)  Description: Physical Therapy Goals  Initiated 4/28/2021 and to be accomplished within 7 day(s)  1. Patient will move from supine to sit and sit to supine , scoot up and down, and roll side to side in bed with modified independence. 2.  Patient will transfer from bed to chair and chair to bed with modified independence using the least restrictive device. 3.  Patient will perform sit to stand with moderate assistance . 4. Patient will ambulate with moderate assistance  for 10 feet with the least restrictive device. PLOF: Pt reporting he was independent, living in home, renting room with other renters. Has wife in room with him. 1 bathroom upstairs and 1 bathroom downstairs. Pt reporting he only goes upstairs to [facility]\". Outcome: Progressing Towards Goal     PHYSICAL THERAPY TREATMENT    Patient: Rosie Berry (61 y.o. male)  Date: 4/29/2021  Diagnosis: Closed fracture of right tibia and fibula [S82.201A, S82.401A] Closed fracture of right tibia and fibula  Procedure(s) (LRB):  RIGHT TIBIA INSERTION INTRA MEDULLARY NAIL/ SYNTHES/ FLAT MOHAN TABLE/ TIBIA TRIANGLE (Right) 2 Days Post-Op  Precautions: Fall, TTWB(R LE)    ASSESSMENT:  RN cleared for PT to work with pt. Pt seen with both PT and OT to maximize patients safety, mobility, and participation. Pt found supine in bed, in NAD, willing to work with PT but asking questions about when he is leaving. He seemed to be confused/drowsy and ahd several safety concerns with all mobility. Pt was edu on the need to perform more amb with safety before he can go home, but pt stated he needs to leave. He was re-edu on TTWB precautions. Pt performed supine-sit with Fiorelal for B Le's. He stood with Rw and Fiorella to amb 3 steps forward. He demonstrated increased trunk sway and inability to  his R LE during amb, requiring modAx2 for safety.  Poor compliance with TTWB. Pt was directed to step back to sit back on EOB for safety concerns. Once sitting, pt becoming more agitated and stating he wants to leave, threatening to pull out his IV. Pt refusing to get back supine in bed with assist from PT/OT, RN's in room at end of session to assist with calming patient down. He was left sitting on EOB with RN. Progression toward goals:   []      Improving appropriately and progressing toward goals  [x]      Improving slowly and progressing toward goals  []      Not making progress toward goals and plan of care will be adjusted     PLAN:  Patient continues to benefit from skilled intervention to address the above impairments. Continue treatment per established plan of care. Discharge Recommendations:  Rehab  Further Equipment Recommendations for Discharge:  rolling walker     SUBJECTIVE:   Patient stated I don't want to get back to bed. I'm gonna leave, I'm gonna call my stick man to pick me up. Take this out of me! (IV). This is the worst hospital ever    OBJECTIVE DATA SUMMARY:   Critical Behavior:  Neurologic State: Alert, Confused  Orientation Level: Other (Comment)(Refused to answer)  Cognition: Poor safety awareness  Safety/Judgement: Insight into deficits, Decreased awareness of need for safety, Decreased awareness of need for assistance  Functional Mobility Training:  Bed Mobility:     Supine to Sit: Minimum assistance; Additional time;Assist x1  Sit to Supine: (Refused)  Scooting: Minimum assistance         Transfers:  Sit to Stand: Minimum assistance  Stand to Sit: Minimum assistance    Balance:  Sitting: Impaired; With support  Sitting - Static: Good (unsupported)  Sitting - Dynamic: Fair (occasional)  Standing: Impaired; With support  Standing - Static: Fair  Standing - Dynamic : Poor       Ambulation/Gait Training:  Distance (ft): 3 Feet (ft)  Assistive Device: Walker, rolling  Ambulation - Level of Assistance:  Moderate assistance;Assist x2        Gait Abnormalities: Decreased step clearance;Trunk sway increased        Base of Support: Center of gravity altered;Shift to left     Speed/Rebecca: Slow;Shuffled  Pain:  Pain level pre-treatment: 0/10  Pain level post-treatment: 0/10   Pain Intervention(s): Medication (see MAR); Rest, Ice, Repositioning   Response to intervention: Nurse notified, See doc flow    Activity Tolerance:   Pt tolerated mobility poorly, non cooperative at end of session and has major safety concerns with amb. Please refer to the flowsheet for vital signs taken during this treatment. After treatment:   [] Patient left in no apparent distress sitting up in chair  [x] Patient left in no apparent distress in bed  [x] Call bell left within reach  [x] Nursing notified  [x] Caregiver present  [x] Bed alarm activated  [] SCDs applied      COMMUNICATION/EDUCATION:   [x]         Role of Physical Therapy in the acute care setting. [x]         Fall prevention education was provided and the patient/caregiver indicated understanding. [x]         Patient/family have participated as able in working toward goals and plan of care. []         Patient/family agree to work toward stated goals and plan of care. []         Patient understands intent and goals of therapy, but is neutral about his/her participation.   []         Patient is unable to participate in stated goals/plan of care: ongoing with therapy staff.  []         Other:        Micah Garcia   Time Calculation: 33 mins

## 2021-04-29 NOTE — PROGRESS NOTES
[facility]  [facility]    Patient: Vincent Marte Age: 59 y.o. : 1960 MR#: [id number] SSN: [id number]  Date/Time: 2021     Subjective:   Patient lying in bed, sleepy but easily woke up. Oriented x3. Slightly jittery and shaky. Discussed with the patient about nonweightbearing status for his right leg. He denies any laceration. Ortho PA at the bedside also explained to the patient about nonweightbearing status. Overnight events noted, patient received Ativan for withdrawal signs. Assessment/Plan:   1. Right tib-fib fracture due to fall at home, status post ORIF 2021  2. Alcohol abuse with signs of withdrawal  3. Seizure disorder  4. Fevers, most likely postop related resolved now  5. Mild thrombocytopenia due to alcohol abuse  6. Hyponatremia, due to # 2    Plan  1. Continue CIWA protocol, thiamine, multivitamin and folic acid with IV fluids. 2. DVT prophylaxis with Lovenox per Ortho  3. Continue incentive spirometry  4. Continue phenobarbital and Dilantin. seizure precaution  5. Continue aspiration and fall precaution. Discussed with RN. 6. Continue PT OT evaluation and treatment  7. Not safe for discharge home     Discussed with sister-in-law [phone] this a.m. sister-in-law asked me to call her back later in the day when she will be with his wife. Wife does not have a direct number. 5 PM called sister-in-law again, wife picked up the phone. Discussed with the wife about patient's current condition and above plan care. Discussed with her that patient declining SNF and wants to come home. Wife states she will try to take care of him at home but she can pick him up tomorrow if remains stable.   Discussed with case management  Discussed with RN      Case discussed with:  [x]Patient  [x]Family  [x]Nursing  []Case Management  DVT Prophylaxis:  [x]Lovenox  []Hep SQ  []SCDs  []Coumadin   []Eliquis/Xarelto     Objective:   VS:   Visit Vitals  /85   Pulse 90   Temp 97.6 °F (36.4 °C)   Resp 16   Ht 6' 2\" (1.88 m)   Wt 70.3 kg (155 lb)   SpO2 96%   BMI 19.90 kg/m²      Tmax/24hrs: Temp (24hrs), Av.5 °F (36.9 °C), Min:97.1 °F (36.2 °C), Max:100.5 °F (38.1 °C)  IOBRIEF    Intake/Output Summary (Last 24 hours) at 2021 1511  Last data filed at 2021 1352  Gross per 24 hour   Intake 60 ml   Output 50 ml   Net 10 ml       General: Sleepy but woke up, no acute distress , mildly jittery and shaky. HEENT: PERRLA, anicteric sclerae. Pulmonary:  CTA Bilaterally. No Wheezing/Rales. Cardiovascular: Regular rate and Rhythm. GI:  Soft, Non distended, Non tender. + Bowel sounds. Extremities: Right leg cast, no edema. No calf tenderness. Neurologic: Oriented x3, no tremors, moves all extremities. Additional:    Medications:   Current Facility-Administered Medications   Medication Dose Route Frequency    dextrose 5% and 0.9% NaCl 1,603 mL with folic acid 1 mg, thiamine 100 mg, mvi, adult no. 4 with vit K 10 mL infusion   IntraVENous DAILY    sodium chloride (NS) flush 5-40 mL  5-40 mL IntraVENous Q8H    sodium chloride (NS) flush 5-40 mL  5-40 mL IntraVENous PRN    acetaminophen (TYLENOL) tablet 650 mg  650 mg Oral Q6H PRN    Or    acetaminophen (TYLENOL) suppository 650 mg  650 mg Rectal Q6H PRN    insulin lispro (HUMALOG) injection   SubCUTAneous Q6H    glucose chewable tablet 16 g  4 Tab Oral PRN    glucagon (GLUCAGEN) injection 1 mg  1 mg IntraMUSCular PRN    dextrose (D50W) injection syrg 12.5-25 g  25-50 mL IntraVENous PRN    ondansetron (ZOFRAN) injection 4 mg  4 mg IntraVENous Q4H PRN    naloxone (NARCAN) injection 0.4 mg  0.4 mg IntraVENous EVERY 2 MINUTES AS NEEDED    albuterol-ipratropium (DUO-NEB) 2.5 MG-0.5 MG/3 ML  3 mL Nebulization Q4H PRN    melatonin tablet 12 mg  12 mg Oral QHS PRN    docusate sodium (COLACE) capsule 100 mg  100 mg Oral BID PRN    nicotine (NICODERM CQ) 14 mg/24 hr patch 1 Patch  1 Patch TransDERmal DAILY PRN    PHENobarbitaL (LUMINAL) tablet 64.8 mg  64.8 mg Oral BID    phenytoin ER (DILANTIN ER) ER capsule 300 mg  300 mg Oral DAILY    phenytoin ER (DILANTIN ER) ER capsule 200 mg  200 mg Oral QHS    folic acid (FOLVITE) tablet 1 mg  1 mg Oral DAILY    thiamine HCL (B-1) tablet 100 mg  100 mg Oral DAILY    therapeutic multivitamin (THERAGRAN) tablet 1 Tab  1 Tab Oral DAILY    sodium chloride (NS) flush 5-40 mL  5-40 mL IntraVENous Q8H    sodium chloride (NS) flush 5-40 mL  5-40 mL IntraVENous PRN    LORazepam (ATIVAN) tablet 1 mg  1 mg Oral Q1H PRN    Or    LORazepam (ATIVAN) injection 1 mg  1 mg IntraVENous Q1H PRN    LORazepam (ATIVAN) tablet 2 mg  2 mg Oral Q1H PRN    Or    LORazepam (ATIVAN) injection 2 mg  2 mg IntraVENous Q1H PRN    LORazepam (ATIVAN) injection 3 mg  3 mg IntraVENous Q15MIN PRN    oxyCODONE IR (ROXICODONE) tablet 5 mg  5 mg Oral Q4H PRN    oxyCODONE IR (ROXICODONE) tablet 10 mg  10 mg Oral Q4H PRN    enoxaparin (LOVENOX) injection 40 mg  40 mg SubCUTAneous DAILY    morphine injection 2 mg  2 mg IntraVENous Q3H PRN       Labs:    Recent Results (from the past 24 hour(s))   GLUCOSE, POC    Collection Time: 04/28/21  5:09 PM   Result Value Ref Range    Glucose (POC) 128 (H) 70 - 110 mg/dL   URINALYSIS W/MICROSCOPIC    Collection Time: 04/28/21  7:40 PM   Result Value Ref Range    Color YELLOW      Appearance CLEAR      Specific gravity 1.016 1.005 - 1.030      pH (UA) 7.5 5.0 - 8.0      Protein Negative NEG mg/dL    Glucose Negative NEG mg/dL    Ketone Negative NEG mg/dL    Bilirubin Negative NEG      Blood Negative NEG      Urobilinogen 1.0 0.2 - 1.0 EU/dL    Nitrites Negative NEG      Leukocyte Esterase TRACE (A) NEG      WBC 3 to 5 0 - 5 /hpf    Epithelial cells 1+ 0 - 5 /lpf   GLUCOSE, POC    Collection Time: 04/28/21 10:39 PM   Result Value Ref Range    Glucose (POC) 94 70 - 055 mg/dL   METABOLIC PANEL, BASIC    Collection Time: 04/29/21  1:45 AM   Result Value Ref Range    Sodium 132 (L) 136 - 145 mmol/L    Potassium 4.0 3.5 - 5.5 mmol/L    Chloride 99 (L) 100 - 111 mmol/L    CO2 27 21 - 32 mmol/L    Anion gap 6 3.0 - 18 mmol/L    Glucose 95 74 - 99 mg/dL    BUN 8 7.0 - 18 MG/DL    Creatinine 0.73 0.6 - 1.3 MG/DL    BUN/Creatinine ratio 11 (L) 12 - 20      GFR est AA >60 >60 ml/min/1.73m2    GFR est non-AA >60 >60 ml/min/1.73m2    Calcium 8.9 8.5 - 10.1 MG/DL   CBC WITH AUTOMATED DIFF    Collection Time: 04/29/21  1:45 AM   Result Value Ref Range    WBC 7.4 4.6 - 13.2 K/uL    RBC 3.54 (L) 4.35 - 5.65 M/uL    HGB 11.2 (L) 13.0 - 16.0 g/dL    HCT 34.4 (L) 36.0 - 48.0 %    MCV 97.2 (H) 74.0 - 97.0 FL    MCH 31.6 24.0 - 34.0 PG    MCHC 32.6 31.0 - 37.0 g/dL    RDW 11.8 11.6 - 14.5 %    PLATELET 377 (L) 583 - 420 K/uL    MPV 11.4 9.2 - 11.8 FL    NEUTROPHILS 62 40 - 73 %    LYMPHOCYTES 23 21 - 52 %    MONOCYTES 14 (H) 3 - 10 %    EOSINOPHILS 0 0 - 5 %    BASOPHILS 0 0 - 2 %    ABS. NEUTROPHILS 4.6 1.8 - 8.0 K/UL    ABS. LYMPHOCYTES 1.7 0.9 - 3.6 K/UL    ABS. MONOCYTES 1.1 0.05 - 1.2 K/UL    ABS. EOSINOPHILS 0.0 0.0 - 0.4 K/UL    ABS. BASOPHILS 0.0 0.0 - 0.1 K/UL    DF AUTOMATED     MAGNESIUM    Collection Time: 04/29/21  1:45 AM   Result Value Ref Range    Magnesium 2.0 1.6 - 2.6 mg/dL   PHOSPHORUS    Collection Time: 04/29/21  1:45 AM   Result Value Ref Range    Phosphorus 2.9 2.5 - 4.9 MG/DL   GLUCOSE, POC    Collection Time: 04/29/21  5:37 AM   Result Value Ref Range    Glucose (POC) 127 (H) 70 - 110 mg/dL   GLUCOSE, POC    Collection Time: 04/29/21 12:13 PM   Result Value Ref Range    Glucose (POC) 84 70 - 110 mg/dL       Signed By: Cielo Chapa MD     April 29, 2021      Disclaimer: Sections of this note are dictated using utilizing voice recognition software. Minor typographical errors may be present. If questions arise, please do not hesitate to contact me or call our department.

## 2021-04-29 NOTE — PROGRESS NOTES
Bedside shift report received from [facility] with sbar  Bed exit on patient trying to get out of bed  Becoming more restless  Ativan 1 mg given  Transferred to [facility]  Walking boot on right foot  Patient getting oob, confused  Ativan 2 mg iv, patient slept for 30 minutes  Sitter at bedside  Bedside shift report given to  Lisa Dinorah with sbar

## 2021-04-29 NOTE — PROGRESS NOTES
Problem: Self Care Deficits Care Plan (Adult)  Goal: *Acute Goals and Plan of Care (Insert Text)  Description: Occupational Therapy Goals  Initiated 4/28/2021 within 7 day(s). 1.  Patient will perform bed mobility in preparation for selfcare with modified independence. 2.  Patient will perform functional activity sitting EOB for 4-7 minutes with G balance , supervision/set-up. 3.  Patient will perform lower body dressing with supervision/set-up using AE prn.  4.  Patient will perform toilet transfers with supervision/set-up maintaining WB with 100% accuracy. 5.  Patient will perform all aspects of toileting with supervision/set-up. Prior Level of Function: Patient was independent with self-care and functional mobility PTA. Outcome: Progressing Towards Goal   OCCUPATIONAL THERAPY TREATMENT    Patient: Duncan Flaherty (8 y.o. male)  Date: 4/29/2021  Diagnosis: Closed fracture of right tibia and fibula [S82.201A, S82.401A] Closed fracture of right tibia and fibula  Procedure(s) (LRB):  RIGHT TIBIA INSERTION INTRA MEDULLARY NAIL/ SYNTHES/ FLAT MOHAN TABLE/ TIBIA TRIANGLE (Right) 2 Days Post-Op  Precautions: Fall, TTWB(R LE)  PLOF: Patient was independent with self-care and functional mobility PTA. Chart, occupational therapy assessment, plan of care, and goals were reviewed. ASSESSMENT:  Pt cleared by RN for OT tx at this time. PT co tx to maximize pt safety and participation. Pt presented supine in bed upon therapist arrival requiring encouragement for participation. Pt overall has poor safety awareness and carryover with poor insight into deficits and is resistive to all education. He was unable to recall TTWB precautions and R LE. Pt was found to be soiled from urine and reported he was aware. Pt maneuvered to EOB from supine with SBA requiring increased time for R LE mgmt. STS MIN A with RW and stood with CGA to perform umair area hygiene.  Attempted functional mobility however pt unable to advance B LE's and was instructed to return to sitting EOB. Once sitting EOB pt adamantly refused to return to supine stating \" I am taking all this off and going home. \" [facility] staff made aware and presently to room to calm pt. Pt was left sitting EOB with RN x 2. Progression toward goals:  []          Improving appropriately and progressing toward goals  [x]          Improving slowly and progressing toward goals  []          Not making progress toward goals and plan of care will be adjusted     PLAN:  Patient continues to benefit from skilled intervention to address the above impairments. Continue treatment per established plan of care. Discharge Recommendations:  SNF  Further Equipment Recommendations for Discharge:  RW, W/C, BSC     SUBJECTIVE:   Patient stated  I am going home no matter what. \"     OBJECTIVE DATA SUMMARY:   Cognitive/Behavioral Status:  Neurologic State: Alert(Simultaneous filing. User may not have seen previous data.)  Orientation Level: Disoriented to place, Disoriented to situation, Disoriented to time(Simultaneous filing. User may not have seen previous data.)  Cognition: Decreased command following, Impaired decision making, Poor safety awareness(Simultaneous filing. User may not have seen previous data.)  Safety/Judgement: Insight into deficits, Decreased awareness of need for safety, Decreased awareness of need for assistance    Functional Mobility and Transfers for ADLs:   Bed Mobility:     Supine to Sit: Stand-by assistance; Additional time(Simultaneous filing. User may not have seen previous data.)  Sit to Supine: (Refused)  Scooting: Contact guard assistance(Simultaneous filing. User may not have seen previous data.)   Transfers:  Sit to Stand: Minimum assistance(Simultaneous filing. User may not have seen previous data.)  Stand to Sit: Contact guard assistance(Simultaneous filing. User may not have seen previous data.)       Balance:  Sitting: Impaired(Simultaneous filing.  User may not have seen previous data.)  Sitting - Static: Fair (occasional)(+ Simultaneous filing. User may not have seen previous data.)  Sitting - Dynamic: Fair (occasional)(Simultaneous filing. User may not have seen previous data.)  Standing: Impaired; With support(Simultaneous filing. User may not have seen previous data.)  Standing - Static: Fair(Simultaneous filing. User may not have seen previous data.)  Standing - Dynamic : Fair(- Simultaneous filing. User may not have seen previous data.)    ADL Intervention:       Lower Body Bathing  Perineal  : Contact guard assistance  Position Performed: Standing  Cues: Verbal cues provided  Adaptive Equipment: Dulcie Pila; Wipes(personal cleansing cloth)       Cognitive Retraining  Safety/Judgement: Insight into deficits; Decreased awareness of need for safety;Decreased awareness of need for assistance      Pain:  Pain level pre-treatment: 0/10   Pain level post-treatment: 0/10    Activity Tolerance:    Poor  Please refer to the flowsheet for vital signs taken during this treatment. After treatment:   [x]  Patient left in no apparent distress EOB   []  Patient left in no apparent distress in bed  [x]  Call bell left within reach  [x]  Nursing notified  [x]  RN present x 2  [x]  Bed alarm activated    COMMUNICATION/EDUCATION:   [x] Role of Occupational Therapy in the acute care setting  [] Home safety education was provided and the patient/caregiver indicated understanding. [] Patient/family have participated as able in working towards goals and plan of care. [] Patient/family agree to work toward stated goals and plan of care. [x] Patient understands intent and goals of therapy, but is neutral about his/her participation. [] Patient is unable to participate in goal setting and plan of care.       Thank you for this referral.  SRIDHAR Quick  Time Calculation: 32 mins

## 2021-04-29 NOTE — PROGRESS NOTES
Spoke with patient at bedside with MD Rylee Yung, patient states he wants to go home. Notified patient that his wife says she is not able to care for him at home and the recommendations are that he go to rehab. Patient continues to decline rehab and states he will find someone who can help him at home and he wants to go home. Patient is agreeable to [facility]. DME order faxed to [facility]. Will continue to follow up.     1:07pm  order noted and referral sent to [facility]. They are able to accept patient. Will continue to follow up.     2:56pm Spoke with priscilla and confirmed they received referral and it was assigned to [facility]. Will continue to follow up.      Triny Gr RN, BSN   Care Management  [phone]

## 2021-04-30 ENCOUNTER — APPOINTMENT (OUTPATIENT)
Dept: GENERAL RADIOLOGY | Age: 61
DRG: 313 | End: 2021-04-30
Attending: INTERNAL MEDICINE
Payer: MEDICAID

## 2021-04-30 LAB
GLUCOSE BLD STRIP.AUTO-MCNC: 133 MG/DL (ref 70–110)
GLUCOSE BLD STRIP.AUTO-MCNC: 142 MG/DL (ref 70–110)
GLUCOSE BLD STRIP.AUTO-MCNC: 85 MG/DL (ref 70–110)
GLUCOSE BLD STRIP.AUTO-MCNC: 97 MG/DL (ref 70–110)

## 2021-04-30 PROCEDURE — 65270000029 HC RM PRIVATE

## 2021-04-30 PROCEDURE — 71045 X-RAY EXAM CHEST 1 VIEW: CPT

## 2021-04-30 PROCEDURE — 97116 GAIT TRAINING THERAPY: CPT

## 2021-04-30 PROCEDURE — 74011250637 HC RX REV CODE- 250/637: Performed by: ORTHOPAEDIC SURGERY

## 2021-04-30 PROCEDURE — 97530 THERAPEUTIC ACTIVITIES: CPT

## 2021-04-30 PROCEDURE — 2709999900 HC NON-CHARGEABLE SUPPLY

## 2021-04-30 PROCEDURE — 99024 POSTOP FOLLOW-UP VISIT: CPT | Performed by: ORTHOPAEDIC SURGERY

## 2021-04-30 PROCEDURE — 99232 SBSQ HOSP IP/OBS MODERATE 35: CPT | Performed by: HOSPITALIST

## 2021-04-30 PROCEDURE — 74011250636 HC RX REV CODE- 250/636: Performed by: ORTHOPAEDIC SURGERY

## 2021-04-30 PROCEDURE — 74011250637 HC RX REV CODE- 250/637: Performed by: INTERNAL MEDICINE

## 2021-04-30 PROCEDURE — 82962 GLUCOSE BLOOD TEST: CPT

## 2021-04-30 PROCEDURE — 97535 SELF CARE MNGMENT TRAINING: CPT

## 2021-04-30 RX ORDER — OXYCODONE HYDROCHLORIDE 5 MG/1
5-10 TABLET ORAL
Status: DISCONTINUED | OUTPATIENT
Start: 2021-04-30 | End: 2021-05-01 | Stop reason: HOSPADM

## 2021-04-30 RX ADMIN — ACETAMINOPHEN 650 MG: 325 TABLET, FILM COATED ORAL at 22:38

## 2021-04-30 RX ADMIN — PHENYTOIN SODIUM 200 MG: 100 CAPSULE ORAL at 22:39

## 2021-04-30 RX ADMIN — FOLIC ACID 1 MG: 1 TABLET ORAL at 08:24

## 2021-04-30 RX ADMIN — PHENOBARBITAL 64.8 MG: 64.8 TABLET ORAL at 08:24

## 2021-04-30 RX ADMIN — ENOXAPARIN SODIUM 40 MG: 40 INJECTION SUBCUTANEOUS at 09:00

## 2021-04-30 RX ADMIN — PHENOBARBITAL 64.8 MG: 64.8 TABLET ORAL at 17:22

## 2021-04-30 RX ADMIN — PHENYTOIN SODIUM 300 MG: 100 CAPSULE ORAL at 08:24

## 2021-04-30 RX ADMIN — Medication 100 MG: at 08:24

## 2021-04-30 RX ADMIN — OXYCODONE HYDROCHLORIDE 10 MG: 5 TABLET ORAL at 12:16

## 2021-04-30 RX ADMIN — THERA TABS 1 TABLET: TAB at 08:24

## 2021-04-30 NOTE — PROGRESS NOTES
Discharge plan remains to go home with [facility] and family/friend assistance. Confirmed with [facility] they are able to accept patient when he discharges. DME order for bedside commode, transfer bench, rolling walker, and wheelchair faxed to [address] [place] [address]. Spoke with Kaelyn Cai at [facility] requires authorization form to be signed by MD. Kaelyn Cai faxed form to  at [phone] and form was received. Will continue to follow up.     10:49am Form signed by MD and faxed to [address] [place] [address]. Will continue to follow up.      Claudette Santa RN, BSN   Care Management  [phone]

## 2021-04-30 NOTE — PROGRESS NOTES
Problem: Mobility Impaired (Adult and Pediatric)  Goal: *Acute Goals and Plan of Care (Insert Text)  Description: Physical Therapy Goals  Initiated 4/28/2021 and to be accomplished within 7 day(s)  1. Patient will move from supine to sit and sit to supine , scoot up and down, and roll side to side in bed with modified independence. 2.  Patient will transfer from bed to chair and chair to bed with modified independence using the least restrictive device. 3.  Patient will perform sit to stand with moderate assistance . 4. Patient will ambulate with moderate assistance  for 10 feet with the least restrictive device. PLOF: Pt reporting he was independent, living in home, renting room with other renters. Has wife in room with him. 1 bathroom upstairs and 1 bathroom downstairs. Pt reporting [facility]". Outcome: Progressing Towards Goal     PHYSICAL THERAPY TREATMENT    Patient: Iker Choi (73 y.o. male)  Date: 4/30/2021  Diagnosis: Closed fracture of right tibia and fibula [S82.201A, S82.401A] Closed fracture of right tibia and fibula  Procedure(s) (LRB):  RIGHT TIBIA INSERTION INTRA MEDULLARY NAIL/ SYNTHES/ FLAT MOHAN TABLE/ TIBIA TRIANGLE (Right) 3 Days Post-Op  Precautions: Fall, TTWB(R LE)  PLOF:     ASSESSMENT:  Pt seen with OT to maximize functional mobility and safety. Pt continues to be fixated on leaving and asking for a cab during session. At start, pt was educated on NWB status as he could not correctly recall and unable to safely amb with TTWB during session. Pt requires increased time and motivation to participate and required min A for supine <> sit for LE management. Once finally sitting EOB, taking >10 min to sit up, pt stood to RW with min A and attempted to perform gait training. Pt able to advance walker and LLE however was leaving RLE behind him without advancing it despite maximal cues for picking up.  Pt trying to lean over walker and not following directions for safety, gradually becoming a high risk for falls as he was not picking his R leg up off the ground. Pt then had to be seated in a wc for safety and then performed SPT wc> bed with min A for bring BLE up onto bed. Pt will continue to need a co-tx 2/2 safety concerns and lack of mobility. Recommend rehab upon discharge. Pt left in bed in NAD with sitter at bedside. Progression toward goals:   []      Improving appropriately and progressing toward goals  [x]      Improving slowly and progressing toward goals  []      Not making progress toward goals and plan of care will be adjusted     PLAN:  Patient continues to benefit from skilled intervention to address the above impairments. Continue treatment per established plan of care. Discharge Recommendations:  Rehab  Further Equipment Recommendations for Discharge:  rolling walker     SUBJECTIVE:   Patient stated I am leaving.     OBJECTIVE DATA SUMMARY:   Critical Behavior:  Neurologic State: Alert, Confused  Orientation Level: Oriented to person  Cognition: Decreased command following  Safety/Judgement: Insight into deficits, Decreased awareness of need for safety, Decreased awareness of need for assistance  Functional Mobility Training:  Bed Mobility:     Supine to Sit: Minimum assistance  Sit to Supine: Minimum assistance  Scooting: Contact guard assistance         Transfers:  Sit to Stand: Minimum assistance  Stand to Sit: Contact guard assistance       Balance:  Sitting: Impaired  Sitting - Static: Fair (occasional)  Sitting - Dynamic: Fair (occasional)  Standing: Impaired  Standing - Static: Fair  Standing - Dynamic : Fair    Ambulation/Gait Training:  Distance (ft): 5 Feet (ft)     Ambulation - Level of Assistance: Moderate assistance        Gait Abnormalities: Decreased step clearance; Antalgic; Toe walking;Shuffling gait        Base of Support: Center of gravity altered;Shift to left  Stance: Left increased  Speed/Rebecca: Slow;Shuffled  Step Length: Left shortened;Right shortened  Swing Pattern: Right asymmetrical    Pain:  Pain level pre-treatment: 9.5/10  Pain level post-treatment: \"   \"/10   Pain Intervention(s): Medication (see MAR); Rest, Ice, Repositioning   Response to intervention: Nurse notified    Activity Tolerance:   Good    Please refer to the flowsheet for vital signs taken during this treatment. After treatment:   [] Patient left in no apparent distress sitting up in chair  [x] Patient left in no apparent distress in bed  [x] Call bell left within reach  [x] Nursing notified  [x] Caregiver present (sitter)  [x] Bed alarm activated  [] SCDs applied      COMMUNICATION/EDUCATION:   [x]         Role of Physical Therapy in the acute care setting. [x]         Fall prevention education was provided and the patient/caregiver indicated understanding. [x]         Patient/family have participated as able in working toward goals and plan of care. [x]         Patient/family agree to work toward stated goals and plan of care. []         Patient understands intent and goals of therapy, but is neutral about his/her participation.   []         Patient is unable to participate in stated goals/plan of care: ongoing with therapy staff.  []         Other:        Belen Quiñones   Time Calculation: 28 mins

## 2021-04-30 NOTE — PROGRESS NOTES
Problem: Lower Extremity Fracture:Post-op Day 3  Goal: Activity/Safety  Outcome: Not Progressing Towards Goal  Goal: Diagnostic Test/Procedures  Outcome: Progressing Towards Goal

## 2021-04-30 NOTE — PROGRESS NOTES
[facility]    Progress Note      Patient: Linda Stoddard               Sex: male          DOA: 4/27/2021         YOB: 1960      Age:  59 y.o.        LOS:  LOS: 3 days         S/P  Procedure(s):  RIGHT TIBIA INSERTION INTRA MEDULLARY NAIL/ SYNTHES/ FLAT MOHAN TABLE/ TIBIA TRIANGLE               Subjective:     No complaints Tolerating diet Ambulating Voiding q shift. He is doing well this morning. Sitting up comfortably in bed eating breakfast. He has participated in PT and understands he is TTWB RLE in his boot at all times. His pain is managed on current medications. Denies cp, sob, abd pain   Objective:      Blood pressure 116/71, pulse 79, temperature 97 °F (36.1 °C), resp. rate 18, height 6' 2\" (1.88 m), weight 155 lb (70.3 kg), SpO2 100 %. Well developed, Well Nourished alert, cooperative, no distress  Incision clean, dry, no drainage, dressing in place, boot in place and in good condition  swelling and tenderness noted in right lower extremity  Sensory is intact   Motor is intact  nv intact  2+distal pulses  Neg calf tenderness  Neg for facial asymmetry     Labs:  CBC  @  CBC:   Lab Results   Component Value Date/Time    WBC 7.4 04/29/2021 01:45 AM    RBC 3.54 (L) 04/29/2021 01:45 AM    HGB 11.2 (L) 04/29/2021 01:45 AM    HCT 34.4 (L) 04/29/2021 01:45 AM    PLATELET 167 (L) 29/00/7499 01:45 AM     CMP:   Lab Results   Component Value Date/Time    Glucose 95 04/29/2021 01:45 AM    Sodium 132 (L) 04/29/2021 01:45 AM    Potassium 4.0 04/29/2021 01:45 AM    Chloride 99 (L) 04/29/2021 01:45 AM    CO2 27 04/29/2021 01:45 AM    BUN 8 04/29/2021 01:45 AM    Creatinine 0.73 04/29/2021 01:45 AM    Calcium 8.9 04/29/2021 01:45 AM    Anion gap 6 04/29/2021 01:45 AM    BUN/Creatinine ratio 11 (L) 04/29/2021 01:45 AM    Alk.  phosphatase 78 04/28/2021 03:25 AM    Protein, total 7.2 04/28/2021 03:25 AM    Albumin 3.1 (L) 04/28/2021 03:25 AM    Globulin 4.1 (H) 04/28/2021 03:25 AM    A-G Ratio 0.8 04/28/2021 03:25 AM   @  Coagulation  Lab Results   Component Value Date    INR 1.1 04/27/2021    APTT 29.1 05/11/2011      Basic Metabolic Profile  Lab Results   Component Value Date     (L) 04/29/2021    CO2 27 04/29/2021    BUN 8 04/29/2021       Medications Reviewed      Assessment/Plan     Principal Problem:    Closed fracture of right tibia and fibula (4/27/2021)    Active Problems:    Hyponatremia (4/27/2021)      Alcohol abuse (4/27/2021)        Procedure(s):  RIGHT TIBIA INSERTION INTRA MEDULLARY NAIL/ SYNTHES/ FLAT MOHAN TABLE/ TIBIA TRIANGLE :      1. PT and/or OT Consults: YES continue PT/OT: oob to chair, splint in place at all times, TTWB RLE                                               2. Incision Care:  Routine Incision Care and Dressing Changes as necessary: NO dressing changes at this time  3. Pain control  4. DVT Prophylaxis - SCD in place, lovenox 30 mg  5. Medications in chart for discharge. 6. Talked about WB restrictions in boot and that boot needs to be worn at all times except for hygiene.     DISCHARGE PLANNING      Intervention :Young Dryer recommended but pt does not want to go to a rehab. Home with H/H being set up at this time. Shaquille Medrano for discharge from ortho standpoint.         [facility]  [phone]

## 2021-04-30 NOTE — PROGRESS NOTES
Shift Progress Note:  Assumed care of patient awake, alert but oriented to self only. Sitter @ bedside. No complaints offered. Uneventful night. Right lower extremity with ace wrap and boot. Warm to touch with + pedal pulse. VSSS  Patient Vitals for the past 12 hrs:   Temp Pulse Resp BP SpO2   04/30/21 0330 97 °F (36.1 °C) 79 18 116/71 100 %   04/29/21 2012 98 °F (36.7 °C) 97 18 124/84 98 %

## 2021-04-30 NOTE — PROGRESS NOTES
[facility]  [facility]    Patient: Lala Sarmiento Age: 59 y.o. : 1960 MR#: [id number] SSN: [id number]  Date/Time: 2021     Subjective:   10 AM  Patient very sleepy, did not wake up during my visit. Vitals stable. 4 PM  Patient alert awake, oriented x3. Slightly jittery, upset that he is not able to go home. Sitter at the bedside. PT/OT eval noted  Overnight events noted, patient had multiple doses of Ativan last 24 hours due to elevated CIWA scores. Assessment/Plan:   1. Right tib-fib fracture due to fall at home, status post ORIF 2021  2. Alcohol abuse with signs of withdrawal  3. Seizure disorder  4. Fevers, most likely postop related resolved now  5. Mild thrombocytopenia due to alcohol abuse  6. Hyponatremia, due to # 2    Plan  1. Continue CIWA protocol, thiamine, multivitamin and folic acid with IV fluids. 2. DVT prophylaxis with Lovenox per Ortho  3. Continue incentive spirometry  4. Continue phenobarbital and Dilantin. seizure precaution  5. Continue aspiration and fall precaution. Discussed with RN. 6. Continue PT OT evaluation and treatment  7. Not safe for discharge home     Discussed with 2 sister-in-law's [phone] and [phone], both say patient's wife not with them right now. I asked him to ask patient's wife to call us back to the floor. Per sister-in-law, wife have difficulty taking care of the patient at home. Patient continues to decline SNF. Given patient's noncompliance and risk of fall, not safe to discharge home without family help. We will discussed with wife and make safe discharge plan. Discussed with case management  Discussed with RN    5:40 PM  Called sister-in-law again, I was able to speak to his wife. Wife told come in tomorrow and see patient. If she feels comfortable to take care of him at home then she will take him home with home health care.   Wife also states she does not have a ride to come to the hospital.      Case discussed with:  [x]Patient  [x]Family  [x]Nursing  []Case Management  DVT Prophylaxis:  [x]Lovenox  []Hep SQ  []SCDs  []Coumadin   []Eliquis/Xarelto     Objective:   VS:   Visit Vitals  /78 (BP 1 Location: Left upper arm, BP Patient Position: At rest)   Pulse 80   Temp 96.9 °F (36.1 °C)   Resp 16   Ht 6' 2\" (1.88 m)   Wt 70.3 kg (155 lb)   SpO2 100%   BMI 19.90 kg/m²      Tmax/24hrs: Temp (24hrs), Av.5 °F (36.4 °C), Min:96.9 °F (36.1 °C), Max:98.1 °F (36.7 °C)  IOBRIEF    Intake/Output Summary (Last 24 hours) at 2021 1707  Last data filed at 2021 0926  Gross per 24 hour   Intake 690 ml   Output 500 ml   Net 190 ml       General: Alert awake, no acute distress , mildly jittery and shaky. HEENT: PERRLA, anicteric sclerae. Pulmonary:  CTA Bilaterally. No Wheezing/Rales. Cardiovascular: Regular rate and Rhythm. GI:  Soft, Non distended, Non tender. + Bowel sounds. Extremities: Right leg cast, no edema. No calf tenderness. Neurologic: Oriented x3, no tremors, moves all extremities. Additional:    Medications:   Current Facility-Administered Medications   Medication Dose Route Frequency    dextrose 5% and 0.9% NaCl 2,384 mL with folic acid 1 mg, thiamine 100 mg, mvi, adult no. 4 with vit K 10 mL infusion   IntraVENous DAILY    sodium chloride (NS) flush 5-40 mL  5-40 mL IntraVENous Q8H    sodium chloride (NS) flush 5-40 mL  5-40 mL IntraVENous PRN    acetaminophen (TYLENOL) tablet 650 mg  650 mg Oral Q6H PRN    Or    acetaminophen (TYLENOL) suppository 650 mg  650 mg Rectal Q6H PRN    insulin lispro (HUMALOG) injection   SubCUTAneous Q6H    glucose chewable tablet 16 g  4 Tab Oral PRN    glucagon (GLUCAGEN) injection 1 mg  1 mg IntraMUSCular PRN    dextrose (D50W) injection syrg 12.5-25 g  25-50 mL IntraVENous PRN    ondansetron (ZOFRAN) injection 4 mg  4 mg IntraVENous Q4H PRN    naloxone (NARCAN) injection 0.4 mg  0.4 mg IntraVENous EVERY 2 MINUTES AS NEEDED    albuterol-ipratropium (DUO-NEB) 2.5 MG-0.5 MG/3 ML  3 mL Nebulization Q4H PRN    melatonin tablet 12 mg  12 mg Oral QHS PRN    docusate sodium (COLACE) capsule 100 mg  100 mg Oral BID PRN    nicotine (NICODERM CQ) 14 mg/24 hr patch 1 Patch  1 Patch TransDERmal DAILY PRN    PHENobarbitaL (LUMINAL) tablet 64.8 mg  64.8 mg Oral BID    phenytoin ER (DILANTIN ER) ER capsule 300 mg  300 mg Oral DAILY    phenytoin ER (DILANTIN ER) ER capsule 200 mg  200 mg Oral QHS    folic acid (FOLVITE) tablet 1 mg  1 mg Oral DAILY    thiamine HCL (B-1) tablet 100 mg  100 mg Oral DAILY    therapeutic multivitamin (THERAGRAN) tablet 1 Tab  1 Tab Oral DAILY    sodium chloride (NS) flush 5-40 mL  5-40 mL IntraVENous Q8H    sodium chloride (NS) flush 5-40 mL  5-40 mL IntraVENous PRN    LORazepam (ATIVAN) tablet 1 mg  1 mg Oral Q1H PRN    Or    LORazepam (ATIVAN) injection 1 mg  1 mg IntraVENous Q1H PRN    LORazepam (ATIVAN) tablet 2 mg  2 mg Oral Q1H PRN    Or    LORazepam (ATIVAN) injection 2 mg  2 mg IntraVENous Q1H PRN    LORazepam (ATIVAN) injection 3 mg  3 mg IntraVENous Q15MIN PRN    oxyCODONE IR (ROXICODONE) tablet 5 mg  5 mg Oral Q4H PRN    oxyCODONE IR (ROXICODONE) tablet 10 mg  10 mg Oral Q4H PRN    enoxaparin (LOVENOX) injection 40 mg  40 mg SubCUTAneous DAILY    morphine injection 2 mg  2 mg IntraVENous Q3H PRN       Labs:    Recent Results (from the past 24 hour(s))   GLUCOSE, POC    Collection Time: 04/29/21  6:07 PM   Result Value Ref Range    Glucose (POC) 96 70 - 110 mg/dL   GLUCOSE, POC    Collection Time: 04/30/21  1:11 AM   Result Value Ref Range    Glucose (POC) 142 (H) 70 - 110 mg/dL   GLUCOSE, POC    Collection Time: 04/30/21  5:57 AM   Result Value Ref Range    Glucose (POC) 133 (H) 70 - 110 mg/dL   GLUCOSE, POC    Collection Time: 04/30/21  4:39 PM   Result Value Ref Range    Glucose (POC) 85 70 - 110 mg/dL       Signed By: Azam Garcia MD     April 30, 2021 Disclaimer: Sections of this note are dictated using utilizing voice recognition software. Minor typographical errors may be present. If questions arise, please do not hesitate to contact me or call our department.

## 2021-04-30 NOTE — PROGRESS NOTES
Problem: Self Care Deficits Care Plan (Adult)  Goal: *Acute Goals and Plan of Care (Insert Text)  Description: Occupational Therapy Goals  Initiated 4/28/2021 within 7 day(s). 1.  Patient will perform bed mobility in preparation for selfcare with modified independence. 2.  Patient will perform functional activity sitting EOB for 4-7 minutes with G balance , supervision/set-up. 3.  Patient will perform lower body dressing with supervision/set-up using AE prn.  4.  Patient will perform toilet transfers with supervision/set-up maintaining WB with 100% accuracy. 5.  Patient will perform all aspects of toileting with supervision/set-up. Prior Level of Function: Patient was independent with self-care and functional mobility PTA. Outcome: Progressing Towards Goal   OCCUPATIONAL THERAPY TREATMENT    Patient: Giovana Bishop (54 y.o. male)  Date: 4/30/2021  Diagnosis: Closed fracture of right tibia and fibula [S82.201A, S82.401A] Closed fracture of right tibia and fibula  Procedure(s) (LRB):  RIGHT TIBIA INSERTION INTRA MEDULLARY NAIL/ SYNTHES/ FLAT MOHAN TABLE/ TIBIA TRIANGLE (Right) 3 Days Post-Op  Precautions: Fall, TTWB(R LE)  PLOF: Patient was independent with self-care and functional mobility PTA. Chart, occupational therapy assessment, plan of care, and goals were reviewed. ASSESSMENT:  Pt presented supine in bed upon therapist arrival with sitter present. PT co tx to maximize pt safety and participation. Pt continues to have confusion with poor safety awareness and carryover and perseverating on leaving with agitation. Pt was re-educated on TTWB on R LE as he wasn't able to recall. Pt educated at length importance of OOB activity for increased strength and endurance needed for ADL carryover and eventually with agreeable with max encouragement. He was assisted to EOB with MIN A requiring increased time 2/2 R LE pain.  STS transfer MIN A with RW requiring max cueing for proper hand placement. Pt took ~ 6 steps with MIN/MOD A with RW, he was unable to  his R LE and was dragging it behind despite vc's and tactile cueing. Pt unable to return back to EOB and was instructed to sit in transport chair for safety concerns and non compliance of TTWB on R LE. Pt required SPT MIN A to sittingEOB from chair and MIN A to return to supine for R LE mgmt. Pt left with HOB elevated, bed alarm active, sitter present, and all needs left within reach. Progression toward goals:  []          Improving appropriately and progressing toward goals  [x]          Improving slowly and progressing toward goals  []          Not making progress toward goals and plan of care will be adjusted     PLAN:  Patient continues to benefit from skilled intervention to address the above impairments. Continue treatment per established plan of care. Discharge Recommendations: Rehab   Further Equipment Recommendations for Discharge: bedside commode, transfer bench, rolling walker, and wheelchair      SUBJECTIVE:   Patient stated \" I just need a cab to go home. \"     OBJECTIVE DATA SUMMARY:   Cognitive/Behavioral Status:  Neurologic State: Alert, Confused  Orientation Level: Oriented to person  Cognition: Decreased command following  Safety/Judgement: Insight into deficits, Decreased awareness of need for safety, Decreased awareness of need for assistance    Functional Mobility and Transfers for ADLs:   Bed Mobility:     Supine to Sit: Minimum assistance; Additional time(for RLE mgmt)  Sit to Supine: Minimum assistance  Scooting: Contact guard assistance   Transfers:  Sit to Stand: Minimum assistance  Stand to Sit: Contact guard assistance       Balance:  Sitting: Impaired  Sitting - Static: Fair (occasional)  Sitting - Dynamic: Fair (occasional)  Standing: Impaired; With support  Standing - Static: Fair  Standing - Dynamic : Fair(-)      Pain:  Pt did not rate pain at this time however would moan with all R LE movements. Activity Tolerance:    Poor  Please refer to the flowsheet for vital signs taken during this treatment. After treatment:   []  Patient left in no apparent distress sitting up in chair  [x]  Patient left in no apparent distress in bed  [x]  Call bell left within reach  [x]  Nursing notified  [x]  Sitter present  [x]  Bed alarm activated    COMMUNICATION/EDUCATION:   [x] Role of Occupational Therapy in the acute care setting  [] Home safety education was provided and the patient/caregiver indicated understanding. [] Patient/family have participated as able in working towards goals and plan of care. [] Patient/family agree to work toward stated goals and plan of care. [x] Patient understands intent and goals of therapy, but is neutral about his/her participation. [] Patient is unable to participate in goal setting and plan of care.       Thank you for this referral.  SRIDHAR Trent  Time Calculation: 26 mins

## 2021-04-30 NOTE — PROGRESS NOTES
Bedside shift report received from [facility] with sbar  Patient in bed sitter at bedside  Physio in room  1215 cristina given for pain  Pain releived  Sitter at bedside  1530 patient sleeping, sitter at bedside  1820 patient trying to get out of bed , states he is going home  Friend arrived in with patient  Paged Dr. Franci Ruano made aware of situation. He stated he manzano  s not feel patient is safe to go home. Friend spoke with doctor and stated he would not take him home.  Friend back in room helped calm patient down, patient now states he will stay until tomorrow  Patient resting quietly in bed, sitter in room  Bedside shift report given to [facility] with sbar  Patient refused banana bag

## 2021-05-01 VITALS
DIASTOLIC BLOOD PRESSURE: 79 MMHG | TEMPERATURE: 97 F | RESPIRATION RATE: 16 BRPM | OXYGEN SATURATION: 100 % | WEIGHT: 155 LBS | BODY MASS INDEX: 19.89 KG/M2 | SYSTOLIC BLOOD PRESSURE: 120 MMHG | HEART RATE: 83 BPM | HEIGHT: 74 IN

## 2021-05-01 LAB
ANION GAP SERPL CALC-SCNC: 6 MMOL/L (ref 3–18)
BASOPHILS # BLD: 0 K/UL (ref 0–0.1)
BASOPHILS NFR BLD: 0 % (ref 0–2)
BUN SERPL-MCNC: 8 MG/DL (ref 7–18)
BUN/CREAT SERPL: 13 (ref 12–20)
CALCIUM SERPL-MCNC: 8.6 MG/DL (ref 8.5–10.1)
CHLORIDE SERPL-SCNC: 106 MMOL/L (ref 100–111)
CO2 SERPL-SCNC: 25 MMOL/L (ref 21–32)
CREAT SERPL-MCNC: 0.6 MG/DL (ref 0.6–1.3)
DIFFERENTIAL METHOD BLD: ABNORMAL
EOSINOPHIL # BLD: 0.1 K/UL (ref 0–0.4)
EOSINOPHIL NFR BLD: 2 % (ref 0–5)
ERYTHROCYTE [DISTWIDTH] IN BLOOD BY AUTOMATED COUNT: 11.9 % (ref 11.6–14.5)
GLUCOSE BLD STRIP.AUTO-MCNC: 111 MG/DL (ref 70–110)
GLUCOSE BLD STRIP.AUTO-MCNC: 120 MG/DL (ref 70–110)
GLUCOSE SERPL-MCNC: 94 MG/DL (ref 74–99)
HCT VFR BLD AUTO: 26.7 % (ref 36–48)
HGB BLD-MCNC: 8.6 G/DL (ref 13–16)
LYMPHOCYTES # BLD: 1.3 K/UL (ref 0.9–3.6)
LYMPHOCYTES NFR BLD: 28 % (ref 21–52)
MAGNESIUM SERPL-MCNC: 1.8 MG/DL (ref 1.6–2.6)
MCH RBC QN AUTO: 31.2 PG (ref 24–34)
MCHC RBC AUTO-ENTMCNC: 32.2 G/DL (ref 31–37)
MCV RBC AUTO: 96.7 FL (ref 74–97)
MONOCYTES # BLD: 0.7 K/UL (ref 0.05–1.2)
MONOCYTES NFR BLD: 14 % (ref 3–10)
NEUTS SEG # BLD: 2.6 K/UL (ref 1.8–8)
NEUTS SEG NFR BLD: 56 % (ref 40–73)
PHOSPHATE SERPL-MCNC: 4.1 MG/DL (ref 2.5–4.9)
PLATELET # BLD AUTO: 146 K/UL (ref 135–420)
PMV BLD AUTO: 10.4 FL (ref 9.2–11.8)
POTASSIUM SERPL-SCNC: 3.5 MMOL/L (ref 3.5–5.5)
RBC # BLD AUTO: 2.76 M/UL (ref 4.35–5.65)
SODIUM SERPL-SCNC: 137 MMOL/L (ref 136–145)
WBC # BLD AUTO: 4.7 K/UL (ref 4.6–13.2)

## 2021-05-01 PROCEDURE — 80048 BASIC METABOLIC PNL TOTAL CA: CPT

## 2021-05-01 PROCEDURE — 74011250636 HC RX REV CODE- 250/636: Performed by: ORTHOPAEDIC SURGERY

## 2021-05-01 PROCEDURE — 2709999900 HC NON-CHARGEABLE SUPPLY

## 2021-05-01 PROCEDURE — 85025 COMPLETE CBC W/AUTO DIFF WBC: CPT

## 2021-05-01 PROCEDURE — 87040 BLOOD CULTURE FOR BACTERIA: CPT

## 2021-05-01 PROCEDURE — 74011250636 HC RX REV CODE- 250/636: Performed by: INTERNAL MEDICINE

## 2021-05-01 PROCEDURE — 36415 COLL VENOUS BLD VENIPUNCTURE: CPT

## 2021-05-01 PROCEDURE — 84100 ASSAY OF PHOSPHORUS: CPT

## 2021-05-01 PROCEDURE — 74011250637 HC RX REV CODE- 250/637: Performed by: INTERNAL MEDICINE

## 2021-05-01 PROCEDURE — 82962 GLUCOSE BLOOD TEST: CPT

## 2021-05-01 PROCEDURE — 99239 HOSP IP/OBS DSCHRG MGMT >30: CPT | Performed by: HOSPITALIST

## 2021-05-01 PROCEDURE — 83735 ASSAY OF MAGNESIUM: CPT

## 2021-05-01 RX ORDER — FOLIC ACID 1 MG/1
1 TABLET ORAL DAILY
Qty: 30 TAB | Refills: 0 | Status: SHIPPED | OUTPATIENT
Start: 2021-05-02

## 2021-05-01 RX ORDER — LANOLIN ALCOHOL/MO/W.PET/CERES
100 CREAM (GRAM) TOPICAL DAILY
Qty: 30 TAB | Refills: 0 | Status: SHIPPED | OUTPATIENT
Start: 2021-05-02

## 2021-05-01 RX ORDER — THERA TABS 400 MCG
1 TAB ORAL DAILY
Qty: 30 TAB | Refills: 0 | Status: SHIPPED | OUTPATIENT
Start: 2021-05-02

## 2021-05-01 RX ADMIN — PHENOBARBITAL 64.8 MG: 64.8 TABLET ORAL at 09:00

## 2021-05-01 RX ADMIN — LORAZEPAM 1 MG: 2 INJECTION INTRAMUSCULAR; INTRAVENOUS at 07:20

## 2021-05-01 RX ADMIN — FOLIC ACID 1 MG: 1 TABLET ORAL at 09:00

## 2021-05-01 RX ADMIN — PHENYTOIN SODIUM 300 MG: 100 CAPSULE ORAL at 09:00

## 2021-05-01 RX ADMIN — Medication 100 MG: at 09:00

## 2021-05-01 RX ADMIN — THERA TABS 1 TABLET: TAB at 09:00

## 2021-05-01 RX ADMIN — ENOXAPARIN SODIUM 40 MG: 40 INJECTION SUBCUTANEOUS at 08:59

## 2021-05-01 NOTE — PROGRESS NOTES
Problem: Falls - Risk of  Goal: *Absence of Falls  Description: Document Marta Saravia Fall Risk and appropriate interventions in the flowsheet.   Outcome: Progressing Towards Goal  Note: Fall Risk Interventions:  Mobility Interventions: Bed/chair exit alarm, Patient to call before getting OOB, Strengthening exercises (ROM-active/passive), Utilize walker, cane, or other assistive device, PT Consult for mobility concerns    Mentation Interventions: Bed/chair exit alarm    Medication Interventions: Bed/chair exit alarm, Patient to call before getting OOB, Teach patient to arise slowly    Elimination Interventions: Bed/chair exit alarm, Call light in reach    History of Falls Interventions: Bed/chair exit alarm         Problem: Patient Education: Go to Patient Education Activity  Goal: Patient/Family Education  Outcome: Progressing Towards Goal     Problem: Patient Education: Go to Patient Education Activity  Goal: Patient/Family Education  Outcome: Progressing Towards Goal     Problem: Patient Education: Go to Patient Education Activity  Goal: Patient/Family Education  Outcome: Progressing Towards Goal     Problem: Pain  Goal: *Control of Pain  Outcome: Progressing Towards Goal  Goal: *PALLIATIVE CARE:  Alleviation of Pain  Outcome: Progressing Towards Goal     Problem: Patient Education: Go to Patient Education Activity  Goal: Patient/Family Education  Outcome: Progressing Towards Goal     Problem: Pain  Goal: *Control of Pain  Outcome: Progressing Towards Goal     Problem: Patient Education: Go to Patient Education Activity  Goal: Patient/Family Education  Outcome: Progressing Towards Goal     Problem: Patient Education: Go to Patient Education Activity  Goal: Patient/Family Education  Outcome: Progressing Towards Goal     Problem: Lower Extremity Fracture:Day of Admission  Goal: Off Pathway (Use only if patient is Off Pathway)  Outcome: Progressing Towards Goal  Goal: Activity/Safety  Outcome: Progressing Towards Goal  Goal: Consults, if ordered  Outcome: Progressing Towards Goal  Goal: Diagnostic Test/Procedures  Outcome: Progressing Towards Goal  Goal: Nutrition/Diet  Outcome: Progressing Towards Goal  Goal: Medications  Outcome: Progressing Towards Goal  Goal: Respiratory  Outcome: Progressing Towards Goal  Goal: Treatments/Interventions/Procedures  Outcome: Progressing Towards Goal  Goal: Psychosocial  Outcome: Progressing Towards Goal  Goal: *Optimal pain control at patient's stated goal  Outcome: Progressing Towards Goal  Goal: *Hemodynamically stable  Outcome: Progressing Towards Goal  Goal: *Adequate oxygenation  Outcome: Progressing Towards Goal     Problem: Lower Extremity Fracture:Day of Surgery (Intiate SCIP Measures for Post-op Care)  Goal: Off Pathway (Use only if patient is Off Pathway)  Outcome: Progressing Towards Goal  Goal: Activity/Safety  Outcome: Progressing Towards Goal  Goal: Consults, if ordered  Outcome: Progressing Towards Goal  Goal: Diagnostic Test/Procedures  Outcome: Progressing Towards Goal  Goal: Nutrition/Diet  Outcome: Progressing Towards Goal  Goal: Medications  Outcome: Progressing Towards Goal  Goal: Respiratory  Outcome: Progressing Towards Goal  Goal: Treatments/Interventions/Procedures  Outcome: Progressing Towards Goal  Goal: Psychosocial  Outcome: Progressing Towards Goal  Goal: *Optimal pain control at patient's stated goal  Outcome: Progressing Towards Goal  Goal: *Hemodynamically stable  Outcome: Progressing Towards Goal  Goal: *Adequate oxygenation  Outcome: Progressing Towards Goal     Problem: Lower Extremity Fracture:Post-op Day 1  Goal: Off Pathway (Use only if patient is Off Pathway)  Outcome: Progressing Towards Goal  Goal: Activity/Safety  Outcome: Progressing Towards Goal  Goal: Consults, if ordered  Outcome: Progressing Towards Goal  Goal: Diagnostic Test/Procedures  Outcome: Progressing Towards Goal  Goal: Nutrition/Diet  Outcome: Progressing Towards Goal  Goal: Discharge Planning  Outcome: Progressing Towards Goal  Goal: Medications  Outcome: Progressing Towards Goal  Goal: Respiratory  Outcome: Progressing Towards Goal  Goal: Treatments/Interventions/Procedures  Outcome: Progressing Towards Goal  Goal: Psychosocial  Outcome: Progressing Towards Goal  Goal: *Optimal pain control at patient's stated goal  Outcome: Progressing Towards Goal  Goal: *Hemodynamically stable  Outcome: Progressing Towards Goal  Goal: *Adequate oxygenation  Outcome: Progressing Towards Goal  Goal: *PT/INR within defined limits  Outcome: Progressing Towards Goal  Goal: *Demonstrates progressive activity  Outcome: Progressing Towards Goal     Problem: Lower Extremity Fracture:Post-op Day 2  Goal: Off Pathway (Use only if patient is Off Pathway)  Outcome: Progressing Towards Goal  Goal: Activity/Safety  Outcome: Progressing Towards Goal  Goal: Diagnostic Test/Procedures  Outcome: Progressing Towards Goal  Goal: Nutrition/Diet  Outcome: Progressing Towards Goal  Goal: Discharge Planning  Outcome: Progressing Towards Goal  Goal: Medications  Outcome: Progressing Towards Goal  Goal: Respiratory  Outcome: Progressing Towards Goal  Goal: Treatments/Interventions/Procedures  Outcome: Progressing Towards Goal  Goal: Psychosocial  Outcome: Progressing Towards Goal  Goal: *Optimal pain control at patient's stated goal  Outcome: Progressing Towards Goal  Goal: *Hemodynamically stable  Outcome: Progressing Towards Goal  Goal: *Adequate oxygenation  Outcome: Progressing Towards Goal  Goal: *PT/INR within defined limits  Outcome: Progressing Towards Goal  Goal: *Demonstrates progressive activity  Outcome: Progressing Towards Goal  Goal: *Voiding  Outcome: Progressing Towards Goal  Goal: *Bowel movement  Outcome: Progressing Towards Goal  Goal: *Tolerating diet  Outcome: Progressing Towards Goal     Problem: Lower Extremity Fracture:Post-op Day 3  Goal: Off Pathway (Use only if patient is Off Pathway)  Outcome: Progressing Towards Goal  Goal: Activity/Safety  Outcome: Progressing Towards Goal  Goal: Diagnostic Test/Procedures  Outcome: Progressing Towards Goal  Goal: Nutrition/Diet  Outcome: Progressing Towards Goal  Goal: Discharge Planning  Outcome: Progressing Towards Goal  Goal: Medications  Outcome: Progressing Towards Goal  Goal: Respiratory  Outcome: Progressing Towards Goal  Goal: Treatments/Interventions/Procedures  Outcome: Progressing Towards Goal  Goal: Psychosocial  Outcome: Progressing Towards Goal  Goal: *Optimal pain control at patient's stated goal  Outcome: Progressing Towards Goal  Goal: *Hemodynamically stable  Outcome: Progressing Towards Goal  Goal: *Adequate oxygenation  Outcome: Progressing Towards Goal  Goal: *PT/INR within defined limits  Outcome: Progressing Towards Goal  Goal: *Demonstrates progressive activity  Outcome: Progressing Towards Goal  Goal: *Voiding  Outcome: Progressing Towards Goal  Goal: *Bowel movement  Outcome: Progressing Towards Goal  Goal: *Tolerating diet  Outcome: Progressing Towards Goal     Problem: Lower Extremity Fracture:Post-op Day 4  Goal: Off Pathway (Use only if patient is Off Pathway)  Outcome: Progressing Towards Goal  Goal: Activity/Safety  Outcome: Progressing Towards Goal  Goal: Diagnostic Test/Procedures  Outcome: Progressing Towards Goal  Goal: Nutrition/Diet  Outcome: Progressing Towards Goal  Goal: Discharge Planning  Outcome: Progressing Towards Goal  Goal: Medications  Outcome: Progressing Towards Goal  Goal: Respiratory  Outcome: Progressing Towards Goal  Goal: Treatments/Interventions/Procedures  Outcome: Progressing Towards Goal  Goal: Psychosocial  Outcome: Progressing Towards Goal     Problem: Lower Extremity Fracture:Discharge Outcomes  Goal: *Hemodynamically stable  Outcome: Progressing Towards Goal  Goal: *Modified independence with transfers, ambulation on levels with assistance devices, stair climbing, ADL's  Outcome: Progressing Towards Goal  Goal: *Independent with active exercises  Outcome: Progressing Towards Goal  Goal: *Describes follow-up/return visits to physicians  Outcome: Progressing Towards Goal  Goal: *Tolerating diet  Outcome: Progressing Towards Goal  Goal: *Optimal pain control at patient's stated goal  Outcome: Progressing Towards Goal  Goal: *Adequate air exchange  Outcome: Progressing Towards Goal  Goal: *Lungs clear or at baseline  Outcome: Progressing Towards Goal  Goal: *Afebrile  Outcome: Progressing Towards Goal  Goal: *Incision intact without signs of infection, redness, warmth  Outcome: Progressing Towards Goal  Goal: *Absence of deep venous thrombosis signs and symptoms(Stroke Metric)  Outcome: Progressing Towards Goal  Goal: *Active bowel function  Outcome: Progressing Towards Goal  Goal: *Adequate urinary output  Outcome: Progressing Towards Goal  Goal: *Discharge anxiety minimal  Outcome: Progressing Towards Goal  Goal: *Describes available resources and support systems  Outcome: Progressing Towards Goal     Problem: Lower Extremity Fracture:Post-op Day 3  Goal: Off Pathway (Use only if patient is Off Pathway)  Outcome: Progressing Towards Goal  Goal: Activity/Safety  Outcome: Progressing Towards Goal  Goal: Diagnostic Test/Procedures  Outcome: Progressing Towards Goal  Goal: Nutrition/Diet  Outcome: Progressing Towards Goal  Goal: Discharge Planning  Outcome: Progressing Towards Goal  Goal: Medications  Outcome: Progressing Towards Goal  Goal: Respiratory  Outcome: Progressing Towards Goal  Goal: Treatments/Interventions/Procedures  Outcome: Progressing Towards Goal  Goal: Psychosocial  Outcome: Progressing Towards Goal  Goal: *Optimal pain control at patient's stated goal  Outcome: Progressing Towards Goal  Goal: *Hemodynamically stable  Outcome: Progressing Towards Goal  Goal: *Adequate oxygenation  Outcome: Progressing Towards Goal  Goal: *PT/INR within defined limits  Outcome: Progressing Towards Goal  Goal: *Demonstrates progressive activity  Outcome: Progressing Towards Goal  Goal: *Voiding  Outcome: Progressing Towards Goal  Goal: *Bowel movement  Outcome: Progressing Towards Goal  Goal: *Tolerating diet  Outcome: Progressing Towards Goal

## 2021-05-01 NOTE — ROUTINE PROCESS
Patient wanted to leave to go home. Patient's brother and Wife came in to  patient. Wife stated that she was taking care of patient at home. MD spoke to the wife about what's going and nurse explained to her the discharge instructions, explained that patient is TTWB on his right foot and she agreed to take patient into her care. Opportunity for questions and clarification offered. Patient and Patient's Wife verified understanding. Patient's wife stated that the DME walker was dropped off yesterday at the residence. I have reviewed discharge instructions with the patient and spouse. The patient and spouse verbalized understanding. Patient armband removed and shredded. Dressing C/D/I. Opportunity for questions and clarification was provided.

## 2021-05-01 NOTE — PROGRESS NOTES
Patient feels fine, alert awake on x4. He wants to go home ASAP. Overnight events noted, low-grade temperature. Patient denies any cough, no dysuria, no abdominal pain, no shortness of breath. Is tolerating diet well. Complains of mild pain at the surgical site. On and off has some mild alcohol withdrawal symptoms. Currently no signs of withdrawal signs, no jitteriness, no shakiness. Oriented x4. No hallucinations. He is upset that his family is not here to pick him up. Per my discussion with his wife, she was supposed to come here at 10 AM today but so far no show. PT recommending rehab but patient declining rehab and SNF. He wants to go home. Discussed with the patient about risk and benefits of going home including risk of falling, recurrent factors leading to morbidity mortality. Patient completely understands risks of falling but continue to insist on going home with home of care. Called patient's wife at 2 different numbers but no answer, voicemail left. We will discharge patient home once family here to  and if family is able to take care of him at home.

## 2021-05-01 NOTE — PROGRESS NOTES
INTERIM UPDATE - 0940 EST on 4/30/2021    Nursing Staff reports that Patient has a fever of 100.7°F.  Notably, Patient had surgery for a fractured extremity earlier this visit. Plan:  Blood Cultures and CXR. Acetaminophen as needed.

## 2021-05-01 NOTE — PROGRESS NOTES
Patient attempting to climb out of bed. Patient stated \" I've got to go I smoke 3 packs a day\" I asked patient if he would like a nicotine patch patient stated \" No I want to smoke\" Patient incontinent of urine sat in chair with assistance, linen changed and patient returned to bed with alarm on for safety and camera for extra security. Afebrile @ present.   Patient Vitals for the past 12 hrs:   Temp Pulse Resp BP SpO2   05/01/21 0229 97.2 °F (36.2 °C) 66 16 120/78 100 %   04/30/21 2011 100.3 °F (37.9 °C) 81 16 112/76 98 %

## 2021-05-01 NOTE — DISCHARGE SUMMARY
Discharge Summary    Patient: Gwen Huynh MRN: [id number]  CSN: [id number]    YOB: 1960  Age: 59 y.o. Sex: male    DOA: 4/27/2021 LOS:  LOS: 4 days        Disposition: Home with [facility]    Discharge Date: 5/1/2021    Admission Diagnosis: Closed fracture of right tibia and fibula [S82.201A, S82.401A]    Discharge Diagnosis:    1. Right tib-fib fracture due to fall at home, status post ORIF 4/27/2021  2. Alcohol abuse with mild withdrawal  3. Seizure disorder  4. Fevers, most likely postop related resolved now  5. Mild thrombocytopenia due to alcohol abuse, improved   6. Hyponatremia, due to # 2, better   7. DM 2    Discharge Condition: Stable      PHYSICAL EXAM  Visit Vitals  /79   Pulse 83   Temp 97 °F (36.1 °C)   Resp 16   Ht 6' 2\" (1.88 m)   Wt 70.3 kg (155 lb)   SpO2 100%   BMI 19.90 kg/m²       General: Alert, cooperative, no acute distress    HEENT: PERRLA, EOMI. Anicteric sclerae. Lungs:  CTA Bilaterally. No Wheezing/Rales. Heart:             Regular rate and Rhythm. Abdomen: Soft, Non distended, Non tender. + Bowel sounds. Extremities: No edema. L leg in high boot   Psych:   Good insight. Not anxious or agitated. Neurologic:  AA, oriented X 3. Moves all ext                                 Hospital Course:   Gwen Huynh is a 59 y.o. -American male who presents to [facility] ER with complaint of Fall. Patient reports that he was drunk to the point that he cannot remember what happened and then fell down 3 steps. Patient reports that he drinks approximately 8 beers/night and continues to Smoke. Patient was admitted to hospital and Ortho was consulted. Ortho recommended ORIF. Patient had some abnormal EKG so cardiology was consulted for preop evaluation. Patient also underwent echocardiogram.  Cardio cleared the patient for surgery. Patient underwent surgery without any complications.   Postoperatively patient developed signs of alcohol withdrawal requiring CIWA protocol. Patient very uncooperative and stubborn. PT saw the patient and recommended rehab. Patient was adamantly refused rehab or SNF placement. Patient wanted to go home [address] during the hospital course. His family did not pick him up so patient stayed in the hospital.  Patient was continued to be evaluated by PT recommended rehab. Patient continued to refuse rehab. Patient in the hospital also had a low-grade temperature, thought to be due to postoperative fever. UA, chest x-ray were within normal limits. Blood culture obtained yesterday are negative so far. No recurrence of fever, no signs of infection now. Had an extensive discussion with the patient's wife over the phone and also at the bedside today about patient's decision to go home. Explained about risk of falls and also explained about nonweightbearing status for his right leg. Wife states she will try to take care of patient at home. Discussed with both patient and wife about risk of going home versus SNF including but not limited to fall leading to more fractures and also malalignment and refracture of existing fracture surgical site. Patient and wife completely understand and aware of the risk, still want to go home with home health care. All wound care has been arranged and also DME has been arranged. Patient will be discharged home with home wound care per patient wishes. Addendum  Patient declined Lovenox for DVT prophylaxis at discharge. Per wife, patient drinks 6-8 beers every day and she thinks he will start drinking as soon as goes home. Given history of drinking and risk of fall and patient refusing, will not give Lovenox for DVT prophylaxis at discharge. Procedures:   ORIF right leg 4/27/2021    Consults:   Dr. Mona Peterson, orthopedic surgery    Imaging studies:   04/27/21   ECHO ADULT COMPLETE 04/27/2021 4/27/2021    Narrative · LV: Estimated LVEF is 55 - 60%. Visually measured ejection fraction. Normal cavity size, wall thickness and systolic function (ejection   fraction normal). Wall motion: normal. Age-appropriate left ventricular   diastolic function. · AV: Probably trileaflet aortic valve. Signed by: Rama Collazo MD       Discharge Medications:     Current Discharge Medication List      START taking these medications    Details   thiamine HCL (B-1) 100 mg tablet Take 1 Tab by mouth daily. Qty: 30 Tab, Refills: 0      therapeutic multivitamin (THERAGRAN) tablet Take 1 Tab by mouth daily. Qty: 30 Tab, Refills: 0      folic acid (FOLVITE) 1 mg tablet Take 1 Tab by mouth daily. Qty: 30 Tab, Refills: 0      oxyCODONE IR (Roxicodone) 5 mg immediate release tablet Take 1 Tab by mouth every four (4) hours as needed for Pain for up to 10 days. Max Daily Amount: 30 mg.  Qty: 60 Tab, Refills: 0    Associated Diagnoses: Post-op pain      enoxaparin (Lovenox) 30 mg/0.3 mL injection 0.3 mL by SubCUTAneous route daily. Indications: deep vein thrombosis prevention  Qty: 28 Syringe, Refills: 0         CONTINUE these medications which have NOT CHANGED    Details   PHENobarbital (LUMINAL) 60 mg tablet take 1 tablet by mouth twice a day  Qty: 60 Tab, Refills: 0      !! phenytoin ER (DILANTIN EXTENDED) 100 mg ER capsule Take 300 mg by mouth daily. Indications: EPILEPSY      !! phenytoin ER (DILANTIN EXTENDED) 100 mg ER capsule Take 200 mg by mouth nightly. Indications: EPILEPSY      metFORMIN (GLUCOPHAGE) 500 mg tablet Take 500 mg by mouth daily (with breakfast). Indications: type 2 diabetes mellitus       ! ! - Potential duplicate medications found. Please discuss with provider. STOP taking these medications       hydrochlorothiazide (HYDRODIURIL) 25 mg tablet Comments:   Reason for Stopping:         acetaminophen-codeine (TYLENOL-CODEINE #3) 300-30 mg per tablet Comments:   Reason for Stopping:             · It is important that you take the medication exactly as they are prescribed.    · Keep your medication in the bottles provided by the pharmacist and keep a list of the medication names, dosages, and times to be taken in your wallet. · Do not take other medications without consulting your doctor. DIET:  Cardiac Diet    ACTIVITY: Activity as tolerated  Home health care for PT/OT consult    ADDITIONAL INFORMATION: If you experience any of the following symptoms but not limited to Fever, chills, nausea, vomiting, diarrhea, change in mentation, falling, bleeding, shortness of breath, chest pain, please call your primary care physician or return to the emergency room if you cannot get hold of your doctor:     FOLLOW UP CARE:  Dr. Bo Barnes MD in 5-7 days. Please call and set up an appointment. Dr. Milian Headings in 1 week    Minutes spent on discharge: 40 minutes spent coordinating this discharge (review instructions/follow-up, prescriptions, preparing report for sign off)    Johnathan Meneses MD  5/1/2021 1:33 PM    Disclaimer: Sections of this note are dictated using utilizing voice recognition software. Minor typographical errors may be present. If questions arise, please do not hesitate to contact me or call our department.

## 2021-05-01 NOTE — PROGRESS NOTES
Problem: Falls - Risk of  Goal: *Absence of Falls  Description: Document Marya Rincon Fall Risk and appropriate interventions in the flowsheet. Outcome: Progressing Towards Goal  Note: Fall Risk Interventions:  Mobility Interventions: Bed/chair exit alarm    Mentation Interventions: Bed/chair exit alarm, Family/sitter at bedside    Medication Interventions: Bed/chair exit alarm    Elimination Interventions: Bed/chair exit alarm, Urinal in reach    History of Falls Interventions: Bed/chair exit alarm         Problem: Lower Extremity Fracture:Post-op Day 4  Goal: Activity/Safety  Outcome: Progressing Towards Goal  Note: Remains confused to time and place, Refusing some care. Sitter @ bedside.   Goal: Diagnostic Test/Procedures  Outcome: Progressing Towards Goal  Goal: Nutrition/Diet  Outcome: Progressing Towards Goal  Goal: Medications  Outcome: Progressing Towards Goal  Goal: Respiratory  Outcome: Progressing Towards Goal  Goal: Treatments/Interventions/Procedures  Outcome: Progressing Towards Goal  Goal: Psychosocial  Outcome: Not Progressing Towards Goal

## 2021-05-01 NOTE — PROGRESS NOTES
Discharge orders noted. Chart reviewed. Home health orders has been processed by [facility] and DME orders being processed by [facility]. Pt's family available to transport pt home.       EDUARDO Gomez RN  Care Management  Pager: [phone]

## 2021-05-01 NOTE — DISCHARGE INSTRUCTIONS
Patient Education        Alcohol Detoxification and Withdrawal: Care Instructions  Your Care Instructions     If you drink alcohol regularly and then suddenly stop, you may go through some physical and emotional problems while the alcohol clears out of your system. Clearing the alcohol from your body is called detoxification, or detox. Physical and emotional problems that may happen during detox are called withdrawal.  Symptoms of withdrawal can be scary and dangerous. Mild symptoms include nausea and vomiting, sweating, shakiness, and intense worry. Severe symptoms include being confused and irritable, feeling things on your body that are not there, seeing or hearing things that are not there, and trembling. You may even have seizures. If your symptoms become severe you must see a doctor. People who drink large amounts of alcohol should not try to detox at home. A person can die of severe alcohol withdrawal.  Symptoms of alcohol withdrawal may begin from 4 to 12 hours after you stop drinking. But they may not start for several days after the last drink. They can last a few days. It is hard to stop drinking. But when you have cleared the alcohol from your system, you will be able to start the next part of your life, free from the burden of being dependent. Follow-up care is a key part of your treatment and safety. Be sure to make and go to all appointments, and call your doctor if you are having problems. It's also a good idea to know your test results and keep a list of the medicines you take. How can you care for yourself at home? · Before you stop drinking, talk to your doctor about how you plan to stop. Be sure to be completely honest with him or her about how much you have been drinking. Your doctor will figure out whether you need to detox in a supervised medical center. · Take your medicines exactly as prescribed. Call your doctor if you think you are having a problem with your medicine.   · Make sure someone you trust is with you the whole time. Have friends and family members take turns staying with you until you are done with detox. · Put a list of emergency numbers near the phone. This should include your doctor, the police, the nearest hospital and emergency room, and neighbors who can help if needed. · Make sure all alcohol is removed from the house before you start. This includes beverages as well as medicines, rubbing alcohol, and certain flavorings like vanilla extract. · Keep \"drinking buddies\" away during the time you are going through detox. · Make your surroundings calm. Soft lights, soft music, and a comfortable place to sit or lie down can help make the process easier. · Drink lots of fluids and eat snacks such as fruit, cheese and crackers, and pretzels. Foods high in carbohydrate may help reduce the craving for alcohol. · Understand that detox is going to be hard. · Keep in mind that the people watching over you during detox are there to help. Explain to them before you start that you may not act like yourself until detox is finished. · Consider joining a support group such as Alcoholics Anonymous. Sharing your experiences with other people who face similar challenges may help you feel less overwhelmed. · Keep the numbers for these national suicide hotlines: 5-182-106-TALK (7-755.642.2542) and 0-459-PWGZITF (2-375.123.8322). If you or someone you know talks about suicide or feeling hopeless, get help right away. When should you call for help? Call 911 anytime you think you may need emergency care.  For example, call if:    · You feel you cannot stop from hurting yourself or someone else.     · You vomit many times and cannot stop.     · You vomit blood or what looks like coffee grounds.     · You have trouble breathing or are breathing very fast.     · Your heart beats more than 120 times a minute and will not slow down.     · You have chest pain.     · You have a seizure.     · You see or feel things that are not there (hallucinate). If you are caring for someone who is going through detox, call if:    · The person passes out (loses consciousness).     · The person sees or feels things that are not there and sees or hears the same things many times.     · The person is very agitated and will not calm down.     · The person becomes violent or threatens to be violent.     · The person has a seizure. Call your doctor now or seek immediate medical care if:    · You have a high fever.     · You have severe belly pain.     · You are very shaky. Watch closely for changes in your health, and be sure to contact your doctor if:    · You do not get better as expected. Where can you learn more? Go to http://www.villalobos.com/  Enter D051 in the search box to learn more about \"Alcohol Detoxification and Withdrawal: Care Instructions. \"  Current as of: June 29, 2020               Content Version: 12.8  © 2006-2021 [employer]. Care instructions adapted under license by [employer] (which disclaims liability or warranty for this information). If you have questions about a medical condition or this instruction, always ask your healthcare professional. [address] any warranty or liability for your use of this information. Patient Education        Broken Lower Leg: Care Instructions  Your Care Instructions     Treatment for your broken leg will depend on how bad the break is. Your doctor may have put your lower leg in a splint or a cast to allow it to heal or keep it stable until you see another doctor. It may take weeks or months for your leg to heal. You can help it heal with some care at home. You heal best when you take good care of yourself. Eat a variety of healthy foods, and don't smoke. Follow-up care is a key part of your treatment and safety.  Be sure to make and go to all appointments, and call your doctor if you are having problems. It's also a good idea to know your test results and keep a list of the medicines you take. How can you care for yourself at home? · Put ice or a cold pack on your lower leg for 10 to 20 minutes at a time. Try to do this every 1 to 2 hours for the next 3 days (when you are awake). Put a thin cloth between the ice and your cast or splint. Keep your cast or splint dry. · Follow the cast care instructions your doctor gives you. If you have a splint, do not take it off unless your doctor tells you to. · Be safe with medicines. Take pain medicines exactly as directed. ? If the doctor gave you a prescription medicine for pain, take it as prescribed. ? If you are not taking a prescription pain medicine, ask your doctor if you can take an over-the-counter medicine. · Do not put weight on your leg unless your doctor tells you to. Use crutches to walk. · Prop up your leg on pillows when you sit or lie down in the first few days after the injury. Keep your leg higher than the level of your heart. This will help reduce swelling. · Follow instructions for exercises to keep your leg strong. · Wiggle your toes often to reduce swelling and stiffness. When should you call for help? Call 911 anytime you think you may need emergency care. For example, call if:    · You have chest pain, are short of breath, or you cough up blood.     · You are very sleepy and you have trouble waking up. Call your doctor now or seek immediate medical care if:    · You have new or worse nausea or vomiting.     · You have new or worse pain.     · Your foot is cool or pale or changes color.     · You have tingling, weakness, or numbness in your toes.     · Your cast or splint feels too tight.     · You have signs of a blood clot in your leg (called a deep vein thrombosis), such as:  ? Pain in your calf, back of the knee, thigh, or groin. ? Redness or swelling in your leg.    Watch closely for changes in your health, and be sure to contact your doctor if:    · You have a problem with your splint or cast.     · You do not get better as expected. Where can you learn more? Go to http://anibal-tiffany.info/  Enter L198 in the search box to learn more about \"Broken Lower Leg: Care Instructions. \"  Current as of: November 16, 2020               Content Version: 12.8  © 9018-5783 [employer]. Care instructions adapted under license by [employer] (which disclaims liability or warranty for this information). If you have questions about a medical condition or this instruction, always ask your healthcare professional. [place] [address] any warranty or liability for your use of this information. Discharge Instructions    Patient: Leobardo Zambrano MRN: [id number]  [facility]: [id number]    YOB: 1960  Age: 59 y.o. Sex: male    DOA: 4/27/2021 LOS:  LOS: 4 days   Discharge Date:      DIET:  Cardiac Diet    ACTIVITY: Activity as tolerated  Home health care for PT/OT consult    ADDITIONAL INFORMATION: If you experience any of the following symptoms but not limited to Fever, chills, nausea, vomiting, diarrhea, change in mentation, falling, bleeding, shortness of breath, chest pain, please call your primary care physician or return to the emergency room if you cannot get hold of your doctor:     FOLLOW UP CARE:  Dr. Sarah Syed MD in 5-7 days. Please call and set up an appointment.   Dr. Deirdre Tracy in 1 week      Corrinne Redman, MD  5/1/2021 1:31 PM

## 2021-05-03 ENCOUNTER — HOME CARE VISIT (OUTPATIENT)
Dept: HOME HEALTH SERVICES | Facility: HOME HEALTH | Age: 61
End: 2021-05-03

## 2021-05-03 NOTE — HOME CARE
Discharge noted over the weekend; [facility] will follow for PT,OT; [facility] referral processed by [facility] central intake over the weekend. ASIA TANG.

## 2021-05-04 ENCOUNTER — HOME CARE VISIT (OUTPATIENT)
Dept: HOME HEALTH SERVICES | Facility: HOME HEALTH | Age: 61
End: 2021-05-04

## 2021-05-05 ENCOUNTER — HOME CARE VISIT (OUTPATIENT)
Dept: SCHEDULING | Facility: HOME HEALTH | Age: 61
End: 2021-05-05
Payer: MEDICAID

## 2021-05-05 ENCOUNTER — TELEPHONE (OUTPATIENT)
Dept: ORTHOPEDIC SURGERY | Age: 61
End: 2021-05-05

## 2021-05-05 VITALS
SYSTOLIC BLOOD PRESSURE: 126 MMHG | HEART RATE: 80 BPM | OXYGEN SATURATION: 98 % | RESPIRATION RATE: 17 BRPM | DIASTOLIC BLOOD PRESSURE: 82 MMHG | TEMPERATURE: 99.8 F

## 2021-05-05 PROCEDURE — G0151 HHCP-SERV OF PT,EA 15 MIN: HCPCS

## 2021-05-05 PROCEDURE — 400013 HH SOC

## 2021-05-05 NOTE — TELEPHONE ENCOUNTER
Mick Bravo has spoken to [address] [place] [address] Gilbert Manuel via phone call and answered all questions and gave recommendations.

## 2021-05-05 NOTE — TELEPHONE ENCOUNTER
Bobby Thomas, Home Health Physical Therapist, called stating the patient is drunk, and does not have any pain medication. He stated he did not push the patient taking medication because he doesn't know how much alcohol he's had. He also stated the patient has taken his cast off and is not being compliant. [facility] is also requesting clarification on PT or OT orders. Yung Wheeler to call 911 if he feels necessary. Please advise [facility] at [phone].

## 2021-05-06 ENCOUNTER — HOME CARE VISIT (OUTPATIENT)
Dept: HOME HEALTH SERVICES | Facility: HOME HEALTH | Age: 61
End: 2021-05-06
Payer: MEDICAID

## 2021-05-07 ENCOUNTER — HOME CARE VISIT (OUTPATIENT)
Dept: SCHEDULING | Facility: HOME HEALTH | Age: 61
End: 2021-05-07
Payer: MEDICAID

## 2021-05-07 VITALS
DIASTOLIC BLOOD PRESSURE: 84 MMHG | OXYGEN SATURATION: 94 % | TEMPERATURE: 97.8 F | HEART RATE: 84 BPM | SYSTOLIC BLOOD PRESSURE: 110 MMHG | RESPIRATION RATE: 17 BRPM

## 2021-05-07 LAB
BACTERIA SPEC CULT: NORMAL
BACTERIA SPEC CULT: NORMAL
SERVICE CMNT-IMP: NORMAL
SERVICE CMNT-IMP: NORMAL

## 2021-05-07 PROCEDURE — G0151 HHCP-SERV OF PT,EA 15 MIN: HCPCS

## 2021-05-10 ENCOUNTER — TELEPHONE (OUTPATIENT)
Dept: ORTHOPEDIC SURGERY | Age: 61
End: 2021-05-10

## 2021-05-10 NOTE — TELEPHONE ENCOUNTER
Patient has a post op scheduled for tomorrow. He called to verify time and date and went on to say he does not have transportation, what should he do. I asked if he had family or friend who could assist with getting transportation set up. His response was not able to be understood. He went on to say a few other things that I could not make out. He does not know what needs to be done to his knee, if stitches need to come out. He did then go on to say to inform his \"wife\" of when he can get scheduled or what he should do. I did not know how to advise for transportation or how long he can go without his wound/incision/dressing can go without being seen.       Patient's wife Hair Jd [phone]

## 2021-05-10 NOTE — TELEPHONE ENCOUNTER
When I attempted to call Lito Barahona the number was for her sister. She gave me another  Number for Cody Brownlee [phone] which I tried and there was no identification on the voice mail and the mail box was full.

## 2021-05-11 ENCOUNTER — HOME CARE VISIT (OUTPATIENT)
Dept: SCHEDULING | Facility: HOME HEALTH | Age: 61
End: 2021-05-11
Payer: MEDICAID

## 2021-05-11 PROCEDURE — G0151 HHCP-SERV OF PT,EA 15 MIN: HCPCS

## 2021-05-12 ENCOUNTER — HOME CARE VISIT (OUTPATIENT)
Dept: HOME HEALTH SERVICES | Facility: HOME HEALTH | Age: 61
End: 2021-05-12
Payer: MEDICAID

## 2021-05-13 NOTE — TELEPHONE ENCOUNTER
He needs to come into the office to be evaluated. He is post op and we need to get xrays as well as take his staples out. We can evaluate his foot when he comes in as well. Please remind him he is TTWB with the boot on at all times. He should be using a walker to help get around. We may not be able to order more H/H because he was discharged due to the past visits and him telling the nurse to leave. We can try to order it again though.

## 2021-05-13 NOTE — TELEPHONE ENCOUNTER
Patient called somewhat unintelligible, stating he injured his foot, he needs medication and that he needs someone to come out to the house to care for his leg. He says he had home health coming out, however, he wasn't pleased with their care. He states he cannot come into the office to see us because he cannot walk. Patient seems to be requesting either a new order for home health or possibly a different nurse to come out for his care if possible.

## 2021-05-13 NOTE — TELEPHONE ENCOUNTER
When I attempted to call Mr. Cliff Andrea sister Ceci Parry at [phone]. She gave me another number to call Mr. Travon Tabares at [phone]. Called and no identification on the voicemail. Left a generic voicemail.

## 2021-05-20 ENCOUNTER — OFFICE VISIT (OUTPATIENT)
Dept: ORTHOPEDIC SURGERY | Age: 61
End: 2021-05-20
Payer: MEDICAID

## 2021-05-20 VITALS — BODY MASS INDEX: 19.9 KG/M2 | HEIGHT: 74 IN | RESPIRATION RATE: 16 BRPM

## 2021-05-20 DIAGNOSIS — Z98.890 S/P ORIF (OPEN REDUCTION INTERNAL FIXATION) FRACTURE: ICD-10-CM

## 2021-05-20 DIAGNOSIS — Z87.81 S/P ORIF (OPEN REDUCTION INTERNAL FIXATION) FRACTURE: ICD-10-CM

## 2021-05-20 DIAGNOSIS — S82.401D CLOSED FRACTURE OF RIGHT TIBIA AND FIBULA WITH ROUTINE HEALING, SUBSEQUENT ENCOUNTER: ICD-10-CM

## 2021-05-20 DIAGNOSIS — G89.18 POST-OP PAIN: ICD-10-CM

## 2021-05-20 DIAGNOSIS — M79.604 RIGHT LEG PAIN: Primary | ICD-10-CM

## 2021-05-20 DIAGNOSIS — S82.201D CLOSED FRACTURE OF RIGHT TIBIA AND FIBULA WITH ROUTINE HEALING, SUBSEQUENT ENCOUNTER: ICD-10-CM

## 2021-05-20 PROCEDURE — 99024 POSTOP FOLLOW-UP VISIT: CPT | Performed by: ORTHOPAEDIC SURGERY

## 2021-05-20 PROCEDURE — 73590 X-RAY EXAM OF LOWER LEG: CPT | Performed by: ORTHOPAEDIC SURGERY

## 2021-05-20 RX ORDER — OXYCODONE HYDROCHLORIDE 5 MG/1
5 TABLET ORAL
Qty: 21 TABLET | Refills: 0 | Status: SHIPPED | OUTPATIENT
Start: 2021-05-20 | End: 2021-05-27

## 2021-05-20 NOTE — PROGRESS NOTES
Cindy Chacon  1960     HISTORY OF PRESENT ILLNESS  Cindy Chacon is a 59 y.o. male who presents today for evaluation s/p right tibia intramedullary nail insertion on 4/27/21. He has not been happy with home health services so far. He has ambulated on his leg with the boot on. He reports the H/H nurse not giving him his pain medication or lovenox injection information. Pt rates he pain 10/10 today. Patient denies any fever, chills, chest pain, shortness of breath or calf pain. There are no new illness or injuries to report since last seen in the office. PHYSICAL EXAM:   Visit Vitals  Resp 16   Ht 6' 2\" (1.88 m)   BMI 19.90 kg/m²      The patient is a well-developed, well-nourished male in no acute distress. The patient is alert and oriented times three. The patient appears to be well groomed. Mood and affect are normal.  ORTHOPEDIC EXAM of right tibia:  Inspection: No swelling, no bruising  Incision, clean, dry, intact, staples in place  Range of motion: full ROM of knee and foot  ttp incisional  Stability: Stable  Strength: N/A    XR: 2 views of the right tib/fib 5/20/21 show nail and screws in good placement. Fracture in anatomical alignment. No callus noted. IMPRESSION:      ICD-10-CM ICD-9-CM    1. Right leg pain  M79.604 729.5 AMB POC XRAY; TIBIA & FIBULA, TWO VIE   2. Closed fracture of right tibia and fibula with routine healing, subsequent encounter  S82.201D V54.16     S82.401D     3. S/P ORIF (open reduction internal fixation) fracture  Z98.890 V45.89     Z87.81 V15.51         PLAN:   Pt having pain post operatively  Incision cleaned. staples removed and steri strips applied  He will remain in a boot and TTWB RLE until 4 weeks post op. At 4 weeks post op will progress him to [facility] RLE in the boot with a walker  Pt given a refill of pain medications today. Roxicodone 5 mg Patient given pain medication for short term acute pain relief.  Goal is to treat patient according to above plan and to ultimately have patient off all pain medications once appropriate. If chronic pain management is required beyond what is expected for current orthopedic problem, will refer patient to pain management.   was reviewed and will be reviewed with every medication refill request.   RTC 3 weeks        Sana Zhu [facility]ist

## 2021-08-04 ENCOUNTER — HOSPITAL ENCOUNTER (EMERGENCY)
Age: 61
Discharge: HOME | End: 2021-08-04
Attending: EMERGENCY MEDICINE
Payer: MEDICAID

## 2021-08-04 VITALS
TEMPERATURE: 98.5 F | DIASTOLIC BLOOD PRESSURE: 88 MMHG | HEART RATE: 69 BPM | BODY MASS INDEX: 18.74 KG/M2 | SYSTOLIC BLOOD PRESSURE: 113 MMHG | WEIGHT: 146 LBS | OXYGEN SATURATION: 98 % | HEIGHT: 74 IN

## 2021-08-04 DIAGNOSIS — R56.9 SEIZURE (HCC): Primary | ICD-10-CM

## 2021-08-04 LAB
ALBUMIN SERPL-MCNC: 3.7 G/DL (ref 3.4–5)
ALBUMIN/GLOB SERPL: 0.7 {RATIO} (ref 0.8–1.7)
ALP SERPL-CCNC: 189 U/L (ref 45–117)
ALT SERPL-CCNC: 99 U/L (ref 16–61)
ANION GAP SERPL CALC-SCNC: 3 MMOL/L (ref 3–18)
AST SERPL-CCNC: 108 U/L (ref 10–38)
BASOPHILS # BLD: 0 K/UL (ref 0–0.1)
BASOPHILS NFR BLD: 1 % (ref 0–2)
BILIRUB SERPL-MCNC: 0.5 MG/DL (ref 0.2–1)
BUN SERPL-MCNC: 9 MG/DL (ref 7–18)
BUN/CREAT SERPL: 11 (ref 12–20)
CALCIUM SERPL-MCNC: 9.1 MG/DL (ref 8.5–10.1)
CHLORIDE SERPL-SCNC: 100 MMOL/L (ref 100–111)
CO2 SERPL-SCNC: 33 MMOL/L (ref 21–32)
CREAT SERPL-MCNC: 0.79 MG/DL (ref 0.6–1.3)
DIFFERENTIAL METHOD BLD: ABNORMAL
EOSINOPHIL # BLD: 0.2 K/UL (ref 0–0.4)
EOSINOPHIL NFR BLD: 4 % (ref 0–5)
ERYTHROCYTE [DISTWIDTH] IN BLOOD BY AUTOMATED COUNT: 12.2 % (ref 11.6–14.5)
GLOBULIN SER CALC-MCNC: 5.2 G/DL (ref 2–4)
GLUCOSE SERPL-MCNC: 101 MG/DL (ref 74–99)
HCT VFR BLD AUTO: 45.2 % (ref 36–48)
HGB BLD-MCNC: 14.6 G/DL (ref 13–16)
LYMPHOCYTES # BLD: 1 K/UL (ref 0.9–3.6)
LYMPHOCYTES NFR BLD: 24 % (ref 21–52)
MAGNESIUM SERPL-MCNC: 2 MG/DL (ref 1.6–2.6)
MCH RBC QN AUTO: 30.2 PG (ref 24–34)
MCHC RBC AUTO-ENTMCNC: 32.3 G/DL (ref 31–37)
MCV RBC AUTO: 93.4 FL (ref 74–97)
MONOCYTES # BLD: 0.4 K/UL (ref 0.05–1.2)
MONOCYTES NFR BLD: 9 % (ref 3–10)
NEUTS SEG # BLD: 2.6 K/UL (ref 1.8–8)
NEUTS SEG NFR BLD: 63 % (ref 40–73)
PHENYTOIN SERPL-MCNC: 19.7 UG/ML (ref 10–20)
PLATELET # BLD AUTO: 167 K/UL (ref 135–420)
PMV BLD AUTO: 10.1 FL (ref 9.2–11.8)
POTASSIUM SERPL-SCNC: 4.1 MMOL/L (ref 3.5–5.5)
PROT SERPL-MCNC: 8.9 G/DL (ref 6.4–8.2)
RBC # BLD AUTO: 4.84 M/UL (ref 4.35–5.65)
SODIUM SERPL-SCNC: 136 MMOL/L (ref 136–145)
WBC # BLD AUTO: 4.1 K/UL (ref 4.6–13.2)

## 2021-08-04 PROCEDURE — 80053 COMPREHEN METABOLIC PANEL: CPT

## 2021-08-04 PROCEDURE — 80185 ASSAY OF PHENYTOIN TOTAL: CPT

## 2021-08-04 PROCEDURE — 85025 COMPLETE CBC W/AUTO DIFF WBC: CPT

## 2021-08-04 PROCEDURE — 83735 ASSAY OF MAGNESIUM: CPT

## 2021-08-04 PROCEDURE — 99283 EMERGENCY DEPT VISIT LOW MDM: CPT

## 2021-08-04 NOTE — ED NOTES
I performed a brief evaluation, including history and physical, of the patient here in triage and I have determined that pt will need further treatment and evaluation from the main side ER physician. I have placed initial orders to help in expediting patients care. August 04, 2021 at 1:34 PM - LEXUS Ruby        Visit Vitals  /88 (BP 1 Location: Right upper arm, BP Patient Position: Sitting)   Pulse 69   Temp 98.5 °F (36.9 °C)   Ht 6' 2\" (1.88 m)   Wt 66.2 kg (146 lb)   SpO2 98%   BMI 18.75 kg/m²        Per EMS patient's girlfriend/wife was taken by EMS to the emergency department for an overdose. At that time patient sat down in the chair and \"I started shaking\". Patient also presented via EMS.

## 2021-08-04 NOTE — ED PROVIDER NOTES
EMERGENCY DEPARTMENT HISTORY AND PHYSICAL EXAM      Date: 8/4/2021  Patient Name: Walker Garcia    History of Presenting Illness     Chief Complaint   Patient presents with    Seizure       History Provided By: Patient    HPI: Walker Garcia, 59 y.o. male PMHx significant for htn, DM, seizures, hep c, alcohol abuse presents via ambulance to the ED. Pt reports he had a seizure witnessed by his nephew at home PTA. Pt's girlfriend was taken by EMS to the emergency department for overdose. Patient reports \"I started shaking after she was taken to ED\". Pt denies illicit drug use. Reports history of seizures and he has been taking his Dilantin as prescribed. Pt reports occasionally accidentally missing a dose. Patient reports seizure occurred on carpet and denies pain to head, arms, abdomen, low back, legs. Patient reports he occasionally has breakthrough seizures. Denies cough, congestion, abdominal pain, dysuria, hematuria. Patient has not taken anything for symptoms. There are no other complaints, changes, or physical findings at this time. PCP: Alis Levy MD    No current facility-administered medications on file prior to encounter. Current Outpatient Medications on File Prior to Encounter   Medication Sig Dispense Refill    thiamine HCL (B-1) 100 mg tablet Take 1 Tab by mouth daily. 30 Tab 0    therapeutic multivitamin (THERAGRAN) tablet Take 1 Tab by mouth daily. 30 Tab 0    folic acid (FOLVITE) 1 mg tablet Take 1 Tab by mouth daily. 30 Tab 0    enoxaparin (Lovenox) 30 mg/0.3 mL injection 0.3 mL by SubCUTAneous route daily. Indications: deep vein thrombosis prevention 28 Syringe 0    PHENobarbital (LUMINAL) 60 mg tablet take 1 tablet by mouth twice a day 60 Tab 0    phenytoin ER (DILANTIN EXTENDED) 100 mg ER capsule Take 300 mg by mouth daily. Indications: EPILEPSY      phenytoin ER (DILANTIN EXTENDED) 100 mg ER capsule Take 200 mg by mouth nightly.  Indications: EPILEPSY      metFORMIN (GLUCOPHAGE) 500 mg tablet Take 500 mg by mouth daily (with breakfast). Indications: type 2 diabetes mellitus         Past History     Past Medical History:  Past Medical History:   Diagnosis Date    Diabetes [facility] [address]     ETOH abuse     Hepatitis C     Hypertension     Seizures ([facility] [address]     last seizure few weeks ago (12/8/14), gets them periodically       Past Surgical History:  Past Surgical History:   Procedure Laterality Date    HX ORTHOPAEDIC Left     foot operation       Family History:  No family history on file. Social History:  Social History     Tobacco Use    Smoking status: Current Every Day Smoker     Packs/day: 0.50     Years: 44.00     Pack years: 22.00    Smokeless tobacco: Never Used   Vaping Use    Vaping Use: Never used   Substance Use Topics    Alcohol use: Yes     Alcohol/week: 56.0 standard drinks     Types: 56 Cans of beer per week     Comment: 8 Beers/night    Drug use: No       Allergies:  No Known Allergies      Review of Systems   Review of Systems   Constitutional: Negative for chills and fever. HENT: Negative for facial swelling. Eyes: Negative for photophobia and visual disturbance. Respiratory: Negative for shortness of breath. Cardiovascular: Negative for chest pain. Gastrointestinal: Negative for abdominal pain, nausea and vomiting. Genitourinary: Negative for flank pain. Skin: Negative for color change, pallor, rash and wound. Neurological: Positive for seizures. Negative for dizziness, weakness, light-headedness and headaches. All other systems reviewed and are negative. Physical Exam   Physical Exam  Vitals and nursing note reviewed. Constitutional:       General: He is not in acute distress. Appearance: He is well-developed. Comments: Pt in NAD  Ambulates without difficulty   HENT:      Head: Normocephalic and atraumatic.       Mouth/Throat:      Comments: No laceration to tongue  Eyes:      Conjunctiva/sclera: Conjunctivae normal.      Comments: PERRL  EOM intact   Cardiovascular:      Rate and Rhythm: Normal rate and regular rhythm. Heart sounds: Normal heart sounds. Pulmonary:      Effort: Pulmonary effort is normal. No respiratory distress. Breath sounds: Normal breath sounds. Comments: Not working to breathe  Abdominal:      General: Bowel sounds are normal. There is no distension. Palpations: Abdomen is soft. Musculoskeletal:         General: Normal range of motion. Skin:     General: Skin is warm. Findings: No rash. Neurological:      Mental Status: He is alert and oriented to person, place, and time. Comments: A&OX4  Speech clear  Gait stable   Psychiatric:         Behavior: Behavior normal.         Diagnostic Study Results     Labs -   No results found for this or any previous visit (from the past 12 hour(s)). Radiologic Studies -   No orders to display     CT Results  (Last 48 hours)    None        CXR Results  (Last 48 hours)    None          Medical Decision Making   I am the first provider for this patient. I reviewed the vital signs, available nursing notes, past medical history, past surgical history, family history and social history. Vital Signs-Reviewed the patient's vital signs. Patient Vitals for the past 12 hrs:   Temp Pulse BP SpO2   08/04/21 1318 98.5 °F (36.9 °C) 69 113/88 98 %         Records Reviewed: Nursing Notes and Old Medical Records    Provider Notes (Medical Decision Making):   DDx: Seizure, Seizure secondary to infection, Mediation noncompliance    60 yo M who presents with seizure prior to arrival.  On exam patient not postictal. Denies pain. Pt A&Ox4 with stable gait. Patient requesting discharge prior to labs resulting and imaging. Stressed patient importance of further work-up and patient declined. Stressed patient that he can return anytime for further evaluation. At time of discharge patient nontoxic-appearing in NAD.     ED Course:   Initial assessment performed. The patients presenting problems have been discussed, and they are in agreement with the care plan formulated and outlined with them. I have encouraged them to ask questions as they arise throughout their visit. Recommended labs and imaging due to breakthrough seizure. Labs already sent to lab. Patient states \"my nephew should not have called the ambulance, and not need to be here\". Patient alert and oriented has not received mind altering medications. Discussed recommendation for labs and imaging and patient declined. Discussed with patient he can return anytime for further evaluation. Patient states he understands and agrees. Disposition:  2:32 PM  Discussed dx and treatment plan. Discussed importance of  PCP follow up. All questions answered. Pt voiced they understood. Return if sx worsen. PLAN:  1. Current Discharge Medication List        2. Follow-up Information     Follow up With Specialties Details Why Contact Info    Alis Levy MD Family Medicine Schedule an appointment as soon as possible for a visit in 1 day  [address] [facility] [address]  [phone]      [facility] EMERGENCY DEPT Emergency Medicine  As needed, If symptoms worsen [address] [place] [address]  [phone]    Ijeoma Wood MD Neurology Schedule an appointment as soon as possible for a visit in 1 day  [address] [facility] [address] [facility] DEPT Emergency Medicine  As needed, If symptoms worsen [address]  [phone]        Return to ED if worse     Diagnosis     Clinical Impression:   1. Seizure [facility])        Attestations:    LEXUS Hernandez    Please note that this dictation was completed with [employer], the computer voice recognition software.   Quite often unanticipated grammatical, syntax, homophones, and other interpretive errors are inadvertently transcribed by the computer software. Please disregard these errors. Please excuse any errors that have escaped final proofreading. Thank you.

## 2021-08-12 ENCOUNTER — TRANSCRIBE ORDER (OUTPATIENT)
Dept: SCHEDULING | Age: 61
End: 2021-08-12

## 2021-08-12 DIAGNOSIS — R22.0 INTRACRANIAL SWELLING: Primary | ICD-10-CM

## 2021-08-12 DIAGNOSIS — R79.89 ELEVATED LFTS: Primary | ICD-10-CM

## 2022-03-18 PROBLEM — S82.201A CLOSED FRACTURE OF RIGHT TIBIA AND FIBULA: Status: ACTIVE | Noted: 2021-04-27

## 2022-03-18 PROBLEM — S82.401A CLOSED FRACTURE OF RIGHT TIBIA AND FIBULA: Status: ACTIVE | Noted: 2021-04-27

## 2022-03-19 PROBLEM — E87.1 HYPONATREMIA: Status: ACTIVE | Noted: 2021-04-27

## 2022-03-20 PROBLEM — F10.10 ALCOHOL ABUSE: Status: ACTIVE | Noted: 2021-04-27

## 2022-06-17 ENCOUNTER — APPOINTMENT (OUTPATIENT)
Dept: CT IMAGING | Age: 62
End: 2022-06-17
Attending: PHYSICIAN ASSISTANT
Payer: MEDICAID

## 2022-06-17 ENCOUNTER — HOSPITAL ENCOUNTER (EMERGENCY)
Age: 62
Discharge: HOME | End: 2022-06-17
Attending: STUDENT IN AN ORGANIZED HEALTH CARE EDUCATION/TRAINING PROGRAM
Payer: MEDICAID

## 2022-06-17 VITALS
BODY MASS INDEX: 18.4 KG/M2 | OXYGEN SATURATION: 100 % | RESPIRATION RATE: 16 BRPM | HEART RATE: 60 BPM | DIASTOLIC BLOOD PRESSURE: 69 MMHG | WEIGHT: 148 LBS | SYSTOLIC BLOOD PRESSURE: 133 MMHG | HEIGHT: 75 IN | TEMPERATURE: 97.7 F

## 2022-06-17 DIAGNOSIS — R22.0 MASS OF RIGHT SUBMANDIBULAR REGION: Primary | ICD-10-CM

## 2022-06-17 LAB
ANION GAP SERPL CALC-SCNC: 5 MMOL/L (ref 3–18)
BASOPHILS # BLD: 0 K/UL (ref 0–0.1)
BASOPHILS NFR BLD: 1 % (ref 0–2)
BUN SERPL-MCNC: 9 MG/DL (ref 7–18)
BUN/CREAT SERPL: 11 (ref 12–20)
CALCIUM SERPL-MCNC: 9.5 MG/DL (ref 8.5–10.1)
CHLORIDE SERPL-SCNC: 101 MMOL/L (ref 100–111)
CO2 SERPL-SCNC: 27 MMOL/L (ref 21–32)
CREAT SERPL-MCNC: 0.83 MG/DL (ref 0.6–1.3)
DIFFERENTIAL METHOD BLD: NORMAL
EOSINOPHIL # BLD: 0.2 K/UL (ref 0–0.4)
EOSINOPHIL NFR BLD: 3 % (ref 0–5)
ERYTHROCYTE [DISTWIDTH] IN BLOOD BY AUTOMATED COUNT: 11.7 % (ref 11.6–14.5)
GLUCOSE SERPL-MCNC: 97 MG/DL (ref 74–99)
HCT VFR BLD AUTO: 47.4 % (ref 36–48)
HGB BLD-MCNC: 15.4 G/DL (ref 13–16)
IMM GRANULOCYTES # BLD AUTO: 0 K/UL (ref 0–0.04)
IMM GRANULOCYTES NFR BLD AUTO: 0 % (ref 0–0.5)
LACTATE BLD-SCNC: 1.82 MMOL/L (ref 0.4–2)
LYMPHOCYTES # BLD: 1.3 K/UL (ref 0.9–3.6)
LYMPHOCYTES NFR BLD: 25 % (ref 21–52)
MCH RBC QN AUTO: 31 PG (ref 24–34)
MCHC RBC AUTO-ENTMCNC: 32.5 G/DL (ref 31–37)
MCV RBC AUTO: 95.6 FL (ref 78–100)
MONOCYTES # BLD: 0.5 K/UL (ref 0.05–1.2)
MONOCYTES NFR BLD: 10 % (ref 3–10)
NEUTS SEG # BLD: 3.1 K/UL (ref 1.8–8)
NEUTS SEG NFR BLD: 61 % (ref 40–73)
NRBC # BLD: 0 K/UL (ref 0–0.01)
NRBC BLD-RTO: 0 PER 100 WBC
PLATELET # BLD AUTO: 145 K/UL (ref 135–420)
PMV BLD AUTO: 10.8 FL (ref 9.2–11.8)
POTASSIUM SERPL-SCNC: 5 MMOL/L (ref 3.5–5.5)
RBC # BLD AUTO: 4.96 M/UL (ref 4.35–5.65)
SODIUM SERPL-SCNC: 133 MMOL/L (ref 136–145)
WBC # BLD AUTO: 5 K/UL (ref 4.6–13.2)

## 2022-06-17 PROCEDURE — 74011000636 HC RX REV CODE- 636: Performed by: STUDENT IN AN ORGANIZED HEALTH CARE EDUCATION/TRAINING PROGRAM

## 2022-06-17 PROCEDURE — 80048 BASIC METABOLIC PNL TOTAL CA: CPT

## 2022-06-17 PROCEDURE — 85025 COMPLETE CBC W/AUTO DIFF WBC: CPT

## 2022-06-17 PROCEDURE — 70487 CT MAXILLOFACIAL W/DYE: CPT

## 2022-06-17 PROCEDURE — 83605 ASSAY OF LACTIC ACID: CPT

## 2022-06-17 PROCEDURE — 75810000289 HC I&D ABSCESS SIMP/COMP/MULT

## 2022-06-17 PROCEDURE — 99285 EMERGENCY DEPT VISIT HI MDM: CPT

## 2022-06-17 RX ORDER — SULFAMETHOXAZOLE AND TRIMETHOPRIM 800; 160 MG/1; MG/1
1 TABLET ORAL 2 TIMES DAILY
Qty: 14 TABLET | Refills: 0 | Status: SHIPPED | OUTPATIENT
Start: 2022-06-17 | End: 2022-06-24

## 2022-06-17 RX ORDER — CEPHALEXIN 500 MG/1
500 CAPSULE ORAL 4 TIMES DAILY
Qty: 28 CAPSULE | Refills: 0 | Status: SHIPPED | OUTPATIENT
Start: 2022-06-17 | End: 2022-06-24

## 2022-06-17 RX ADMIN — IOPAMIDOL 100 ML: 612 INJECTION, SOLUTION INTRAVENOUS at 10:02

## 2022-06-17 NOTE — ED PROVIDER NOTES
EMERGENCY DEPARTMENT HISTORY AND PHYSICAL EXAM    8:23 AM      Date: 6/17/2022  Patient Name: Monica Beltran    History of Presenting Illness     Chief Complaint   Patient presents with    Mass       History Provided By: Patient    Additional History (Context): Monica Beltran is a 57 y.o. male with hx of DM, HTN, Etoh abuse, and other noted PMH who presents with c/o R sided submandibular mass x months. Pt notes the site became painful the past few days which is what prompted him to come in. Denies exacerbating or alleviating factors. Pt denies fever or chills, drainage from site, weight loss, history of cancer. Patient notes he does smoke. Denies hx of IVDA, DM or MRSA. PCP: Natali Munroe MD    Current Outpatient Medications   Medication Sig Dispense Refill    cephALEXin (Keflex) 500 mg capsule Take 1 Capsule by mouth four (4) times daily for 7 days. 28 Capsule 0    trimethoprim-sulfamethoxazole (Bactrim DS) 160-800 mg per tablet Take 1 Tablet by mouth two (2) times a day for 7 days. 14 Tablet 0    thiamine HCL (B-1) 100 mg tablet Take 1 Tab by mouth daily. 30 Tab 0    therapeutic multivitamin (THERAGRAN) tablet Take 1 Tab by mouth daily. 30 Tab 0    folic acid (FOLVITE) 1 mg tablet Take 1 Tab by mouth daily. 30 Tab 0    enoxaparin (Lovenox) 30 mg/0.3 mL injection 0.3 mL by SubCUTAneous route daily. Indications: deep vein thrombosis prevention 28 Syringe 0    PHENobarbital (LUMINAL) 60 mg tablet take 1 tablet by mouth twice a day 60 Tab 0    phenytoin ER (DILANTIN EXTENDED) 100 mg ER capsule Take 300 mg by mouth daily. Indications: EPILEPSY      phenytoin ER (DILANTIN EXTENDED) 100 mg ER capsule Take 200 mg by mouth nightly. Indications: EPILEPSY      metFORMIN (GLUCOPHAGE) 500 mg tablet Take 500 mg by mouth daily (with breakfast).  Indications: type 2 diabetes mellitus         Past History     Past Medical History:  Past Medical History:   Diagnosis Date    Diabetes ([address]     ETOH abuse     Hepatitis C     Hypertension     Se[facility] [address] [address]     last seizure few weeks ago (12/8/14), gets them periodically       Past Surgical History:  Past Surgical History:   Procedure Laterality Date    HX ORTHOPAEDIC Left     foot operation       Family History:  No family history on file. Social History:  Social History     Tobacco Use    Smoking status: Current Every Day Smoker     Packs/day: 0.50     Years: 44.00     Pack years: 22.00    Smokeless tobacco: Never Used   Vaping Use    Vaping Use: Never used   Substance Use Topics    Alcohol use: Yes     Alcohol/week: 56.0 standard drinks     Types: 56 Cans of beer per week     Comment: 8 Beers/night    Drug use: No       Allergies:  No Known Allergies      Review of Systems       Review of Systems   Constitutional: Negative for chills and fever. Respiratory: Negative for shortness of breath. Cardiovascular: Negative for chest pain. Gastrointestinal: Negative for abdominal pain, nausea and vomiting. Skin: Negative for rash. R submandibular mass     Neurological: Negative for weakness. All other systems reviewed and are negative. Physical Exam     Visit Vitals  /69 (BP 1 Location: Left upper arm, BP Patient Position: At rest)   Pulse 60   Temp 97.7 °F (36.5 °C)   Resp 16   Ht 6' 3\" (1.905 m)   Wt 67.1 kg (148 lb)   SpO2 100%   BMI 18.50 kg/m²         Physical Exam  Vitals and nursing note reviewed. Constitutional:       General: He is not in acute distress. Appearance: He is well-developed. He is not ill-appearing, toxic-appearing or diaphoretic. HENT:      Head: Normocephalic and atraumatic. Neck:     Cardiovascular:      Rate and Rhythm: Normal rate and regular rhythm. Heart sounds: Normal heart sounds. No murmur heard. No friction rub. No gallop. Pulmonary:      Effort: Pulmonary effort is normal. No respiratory distress. Breath sounds: Normal breath sounds. No wheezing or rales. Musculoskeletal:         General: Normal range of motion. Cervical back: Normal range of motion and neck supple. No crepitus. Normal range of motion. Skin:     General: Skin is warm. Findings: No rash. Neurological:      Mental Status: He is alert. Diagnostic Study Results     Labs -  Recent Results (from the past 12 hour(s))   CBC WITH AUTOMATED DIFF    Collection Time: 06/17/22  8:20 AM   Result Value Ref Range    WBC 5.0 4.6 - 13.2 K/uL    RBC 4.96 4.35 - 5.65 M/uL    HGB 15.4 13.0 - 16.0 g/dL    HCT 47.4 36.0 - 48.0 %    MCV 95.6 78.0 - 100.0 FL    MCH 31.0 24.0 - 34.0 PG    MCHC 32.5 31.0 - 37.0 g/dL    RDW 11.7 11.6 - 14.5 %    PLATELET 271 452 - 631 K/uL    MPV 10.8 9.2 - 11.8 FL    NRBC 0.0 0  WBC    ABSOLUTE NRBC 0.00 0.00 - 0.01 K/uL    NEUTROPHILS 61 40 - 73 %    LYMPHOCYTES 25 21 - 52 %    MONOCYTES 10 3 - 10 %    EOSINOPHILS 3 0 - 5 %    BASOPHILS 1 0 - 2 %    IMMATURE GRANULOCYTES 0 0.0 - 0.5 %    ABS. NEUTROPHILS 3.1 1.8 - 8.0 K/UL    ABS. LYMPHOCYTES 1.3 0.9 - 3.6 K/UL    ABS. MONOCYTES 0.5 0.05 - 1.2 K/UL    ABS. EOSINOPHILS 0.2 0.0 - 0.4 K/UL    ABS. BASOPHILS 0.0 0.0 - 0.1 K/UL    ABS. IMM. GRANS. 0.0 0.00 - 0.04 K/UL    DF AUTOMATED     METABOLIC PANEL, BASIC    Collection Time: 06/17/22  8:20 AM   Result Value Ref Range    Sodium 133 (L) 136 - 145 mmol/L    Potassium 5.0 3.5 - 5.5 mmol/L    Chloride 101 100 - 111 mmol/L    CO2 27 21 - 32 mmol/L    Anion gap 5 3.0 - 18 mmol/L    Glucose 97 74 - 99 mg/dL    BUN 9 7.0 - 18 MG/DL    Creatinine 0.83 0.6 - 1.3 MG/DL    BUN/Creatinine ratio 11 (L) 12 - 20      GFR est AA >60 >60 ml/min/1.73m2    GFR est non-AA >60 >60 ml/min/1.73m2    Calcium 9.5 8.5 - 10.1 MG/DL   POC LACTIC ACID    Collection Time: 06/17/22  8:29 AM   Result Value Ref Range    Lactic Acid (POC) 1.82 0.40 - 2.00 mmol/L       Radiologic Studies -   CT MAXILLOFACIAL W CONT   Final Result   1.   Right submental simple appearing cystic structure with a mildly thickened   wall. Considerations include thyroglossal duct cyst, though these are more   typically midline, branchial cleft cyst, dermoid cyst, abscess. 2.  Mild sialoadenitis. 3.  Mild generalized brain parenchymal volume loss. Medical Decision Making   I am the first provider for this patient. I reviewed the vital signs, available nursing notes, past medical history, past surgical history, family history and social history. Vital Signs-Reviewed the patient's vital signs. Pulse Oximetry Analysis -  100% on room air     Records Reviewed: Nursing Notes and Old Medical Records (Time of Review: 8:23 AM)    ED Course: Progress Notes, Reevaluation, and Consults:  11:30 AM Reviewed results and plan with patient. Discussed need for close outpatient follow-up this week for reassessment. Discussed strict return precautions, including fever, increased swelling, or any other medical concerns. Provider Notes (Medical Decision Making): 78-year-old male who presents to the ED due to right submandibular mass. Afebrile, nontoxic-appearing, looks well. CT demonstrates cyst versus abscess. Needle aspiration performed with purulent drainage, I&D performed with moderate purulent drainage. No overlying erythema or streaking. Patient is stable for discharge with abx and close outpatient follow-up for further assessment. Strict return precautions provided      I&D Abcess Complex    Date/Time: 6/17/2022 6:59 PM  Performed by: LEXUS Coronado  Authorized by: LEXUS Coronado     Consent:     Consent obtained:  Verbal and written    Consent given by:  Patient    Risks discussed:  Bleeding, damage to other organs, infection, incomplete drainage and pain    Alternatives discussed:  No treatment  Location:     Type:  Abscess    Location:  Head  Anesthesia (see MAR for exact dosages):      Anesthesia method:  Local infiltration    Local anesthetic:  Lidocaine 1% w/o epi  Procedure type: Complexity:  Complex  Procedure details:     Needle aspiration: yes      Needle size:  18 G    Incision types:  Single straight    Incision depth:  Subcutaneous    Scalpel blade:  11    Wound management:  Probed and deloculated and irrigated with saline    Drainage:  Purulent    Drainage amount:  Copious    Wound treatment:  Wound left open    Packing materials:  None  Post-procedure details:     Patient tolerance of procedure: Tolerated well, no immediate complications          Diagnosis     Clinical Impression:   1. Mass of right submandibular region        Disposition: discharge    Follow-up Information     Follow up With Specialties Details Why [facility]    [facility] EMERGENCY DEPT Emergency Medicine  If symptoms worsen [address] [facility]    Natali Munroe MD Family Medicine Schedule an appointment as soon as possible for a visit   [address]      Annelise Salinas MD Otolaryngology, Surgery Physician Schedule an appointment as soon as possible for a visit   [address] [address]  [phone]             Discharge Medication List as of 6/17/2022 11:39 AM      START taking these medications    Details   cephALEXin (Keflex) 500 mg capsule Take 1 Capsule by mouth four (4) times daily for 7 days. , Normal, Disp-28 Capsule, R-0      trimethoprim-sulfamethoxazole (Bactrim DS) 160-800 mg per tablet Take 1 Tablet by mouth two (2) times a day for 7 days. , Normal, Disp-14 Tablet, R-0         CONTINUE these medications which have NOT CHANGED    Details   thiamine HCL (B-1) 100 mg tablet Take 1 Tab by mouth daily. , Print, Disp-30 Tab, R-0      therapeutic multivitamin (THERAGRAN) tablet Take 1 Tab by mouth daily. , Print, Disp-30 Tab, R-0      folic acid (FOLVITE) 1 mg tablet Take 1 Tab by mouth daily. , Print, Disp-30 Tab, R-0      enoxaparin (Lovenox) 30 mg/0.3 mL injection 0.3 mL by SubCUTAneous route daily.  Indications: deep vein thrombosis prevention, Print, Disp-28 Syringe, R-0      PHENobarbital (LUMINAL) 60 mg tablet take 1 tablet by mouth twice a day, Print, Disp-60 Tab, R-0      !! phenytoin ER (DILANTIN EXTENDED) 100 mg ER capsule Take 300 mg by mouth daily. Indications: EPILEPSY, Historical Med      !! phenytoin ER (DILANTIN EXTENDED) 100 mg ER capsule Take 200 mg by mouth nightly. Indications: EPILEPSY, Historical Med      metFORMIN (GLUCOPHAGE) 500 mg tablet Take 500 mg by mouth daily (with breakfast). Indications: type 2 diabetes mellitus, Historical Med       !! - Potential duplicate medications found. Please discuss with provider. Dictation disclaimer:  Please note that this dictation was completed with "[employer] the [employer] voice recognition software. Quite often unanticipated grammatical, syntax, homophones, and other interpretive errors are inadvertently transcribed by the computer software. Please disregard these errors. Please excuse any errors that have escaped final proofreading.

## 2022-06-17 NOTE — DISCHARGE INSTRUCTIONS
Take medication as prescribed. Follow-up with the ENT physician within 2 days for reassessment. Bring the results from this visit with you for their review. Return to the ED immediately for any new, worsening, or persistent symptoms, including fever, drainage, or any other medical concerns.

## 2022-06-17 NOTE — ED TRIAGE NOTES
Pt complains of a lump on his lower face, pt states it has been there for a few months. He states that \"I saw them before and they said it was nothing, but it really hurts. \"

## 2022-08-29 ENCOUNTER — TRANSCRIBE ORDER (OUTPATIENT)
Dept: SCHEDULING | Age: 62
End: 2022-08-29

## 2022-08-29 DIAGNOSIS — F17.210 CIGARETTE SMOKER: Primary | ICD-10-CM

## 2022-12-22 ENCOUNTER — TELEPHONE (OUTPATIENT)
Dept: SURGERY | Age: 62
End: 2022-12-22

## 2023-01-24 ENCOUNTER — TELEPHONE (OUTPATIENT)
Dept: SURGERY | Age: 63
End: 2023-01-24

## 2023-02-20 ENCOUNTER — TELEPHONE (OUTPATIENT)
Age: 63
End: 2023-02-20

## 2023-06-15 ENCOUNTER — HOSPITAL ENCOUNTER (EMERGENCY)
Facility: HOSPITAL | Age: 63
Discharge: HOME | End: 2023-06-15
Attending: EMERGENCY MEDICINE
Payer: MEDICAID

## 2023-06-15 ENCOUNTER — APPOINTMENT (OUTPATIENT)
Facility: HOSPITAL | Age: 63
End: 2023-06-15
Payer: MEDICAID

## 2023-06-15 VITALS
HEIGHT: 75 IN | RESPIRATION RATE: 25 BRPM | DIASTOLIC BLOOD PRESSURE: 99 MMHG | BODY MASS INDEX: 18.65 KG/M2 | SYSTOLIC BLOOD PRESSURE: 153 MMHG | TEMPERATURE: 98.4 F | OXYGEN SATURATION: 98 % | HEART RATE: 63 BPM | WEIGHT: 150 LBS

## 2023-06-15 DIAGNOSIS — I10 HYPERTENSION, UNSPECIFIED TYPE: Primary | ICD-10-CM

## 2023-06-15 LAB
ALBUMIN SERPL-MCNC: 3.4 G/DL (ref 3.4–5)
ALBUMIN/GLOB SERPL: 0.6 (ref 0.8–1.7)
ALP SERPL-CCNC: 121 U/L (ref 45–117)
ALT SERPL-CCNC: 66 U/L (ref 16–61)
ANION GAP SERPL CALC-SCNC: 1 MMOL/L (ref 3–18)
AST SERPL-CCNC: 76 U/L (ref 10–38)
BASOPHILS # BLD: 0 K/UL (ref 0–0.1)
BASOPHILS NFR BLD: 1 % (ref 0–2)
BILIRUB SERPL-MCNC: 0.4 MG/DL (ref 0.2–1)
BUN SERPL-MCNC: 7 MG/DL (ref 7–18)
BUN/CREAT SERPL: 8 (ref 12–20)
CALCIUM SERPL-MCNC: 9.3 MG/DL (ref 8.5–10.1)
CHLORIDE SERPL-SCNC: 100 MMOL/L (ref 100–111)
CO2 SERPL-SCNC: 31 MMOL/L (ref 21–32)
CREAT SERPL-MCNC: 0.89 MG/DL (ref 0.6–1.3)
DIFFERENTIAL METHOD BLD: NORMAL
EOSINOPHIL # BLD: 0.1 K/UL (ref 0–0.4)
EOSINOPHIL NFR BLD: 2 % (ref 0–5)
ERYTHROCYTE [DISTWIDTH] IN BLOOD BY AUTOMATED COUNT: 11.8 % (ref 11.6–14.5)
ETHANOL SERPL-MCNC: <3 MG/DL (ref 0–3)
GLOBULIN SER CALC-MCNC: 6 G/DL (ref 2–4)
GLUCOSE SERPL-MCNC: 92 MG/DL (ref 74–99)
HCT VFR BLD AUTO: 44.5 % (ref 36–48)
HGB BLD-MCNC: 14.2 G/DL (ref 13–16)
IMM GRANULOCYTES # BLD AUTO: 0 K/UL (ref 0–0.04)
IMM GRANULOCYTES NFR BLD AUTO: 0 % (ref 0–0.5)
LYMPHOCYTES # BLD: 1.4 K/UL (ref 0.9–3.6)
LYMPHOCYTES NFR BLD: 25 % (ref 21–52)
MCH RBC QN AUTO: 31 PG (ref 24–34)
MCHC RBC AUTO-ENTMCNC: 31.9 G/DL (ref 31–37)
MCV RBC AUTO: 97.2 FL (ref 78–100)
MONOCYTES # BLD: 0.6 K/UL (ref 0.05–1.2)
MONOCYTES NFR BLD: 10 % (ref 3–10)
NEUTS SEG # BLD: 3.7 K/UL (ref 1.8–8)
NEUTS SEG NFR BLD: 63 % (ref 40–73)
NRBC # BLD: 0 K/UL (ref 0–0.01)
NRBC BLD-RTO: 0 PER 100 WBC
PLATELET # BLD AUTO: 158 K/UL (ref 135–420)
PMV BLD AUTO: 10.1 FL (ref 9.2–11.8)
POTASSIUM SERPL-SCNC: 3.7 MMOL/L (ref 3.5–5.5)
PROT SERPL-MCNC: 9.4 G/DL (ref 6.4–8.2)
RBC # BLD AUTO: 4.58 M/UL (ref 4.35–5.65)
SODIUM SERPL-SCNC: 132 MMOL/L (ref 136–145)
WBC # BLD AUTO: 5.8 K/UL (ref 4.6–13.2)

## 2023-06-15 PROCEDURE — 80053 COMPREHEN METABOLIC PANEL: CPT

## 2023-06-15 PROCEDURE — 99284 EMERGENCY DEPT VISIT MOD MDM: CPT

## 2023-06-15 PROCEDURE — 82077 ASSAY SPEC XCP UR&BREATH IA: CPT

## 2023-06-15 PROCEDURE — 70450 CT HEAD/BRAIN W/O DYE: CPT

## 2023-06-15 PROCEDURE — 85025 COMPLETE CBC W/AUTO DIFF WBC: CPT

## 2023-06-15 ASSESSMENT — ENCOUNTER SYMPTOMS: RESPIRATORY NEGATIVE: 1

## 2023-06-15 NOTE — ED PROVIDER NOTES
DIAGNOSIS/MDM:   Vitals:    Vitals:    06/15/23 0417   BP: (!) 153/99   Pulse: 63   Resp: 25   Temp: 98.4 °F (36.9 °C)   TempSrc: Oral   SpO2: 98%   Weight: 150 lb (68 kg)   Height: 6' 3\" (1.905 m)           Medical Decision Making  58-year-old male presents emergency department neurologically intact CAT scan is unremarkable will discharge home. Amount and/or Complexity of Data Reviewed  Labs: ordered. Radiology: ordered. REASSESSMENT          CRITICAL CARE TIME   Total Critical Care time was  minutes, excluding separately reportable procedures. There was a high probability of clinically significant/life threatening deterioration in the patient's condition which required my urgent intervention. CONSULTS:  None    PROCEDURES:  Unless otherwise noted below, none     Procedures        FINAL IMPRESSION      1. Hypertension, unspecified type          DISPOSITION/PLAN   DISPOSITION Discharge - Pending Orders Complete 06/15/2023 06:34:39 AM      PATIENT REFERRED TO:  Ady Florentino, [address] [id number] [phone]    Call today        DISCHARGE MEDICATIONS:  New Prescriptions    No medications on file     Controlled Substances Monitoring:     No flowsheet data found. (Please note that portions of this note were completed with a voice recognition program.  Efforts were made to edit the dictations but occasionally words are mis-transcribed. )    Rocio Iniguez MD (electronically signed)  Attending Emergency Physician

## 2023-06-15 NOTE — ED TRIAGE NOTES
Pt woke up with a headache and took his blood pressure which was high. EMS had a systolic of 708. Pt is alert and oriented and ambulatory.

## 2023-06-17 ENCOUNTER — HOSPITAL ENCOUNTER (EMERGENCY)
Facility: HOSPITAL | Age: 63
Discharge: HOME | End: 2023-06-17
Attending: STUDENT IN AN ORGANIZED HEALTH CARE EDUCATION/TRAINING PROGRAM
Payer: MEDICAID

## 2023-06-17 ENCOUNTER — APPOINTMENT (OUTPATIENT)
Facility: HOSPITAL | Age: 63
End: 2023-06-17
Payer: MEDICAID

## 2023-06-17 VITALS
HEART RATE: 71 BPM | RESPIRATION RATE: 17 BRPM | OXYGEN SATURATION: 100 % | HEIGHT: 73 IN | SYSTOLIC BLOOD PRESSURE: 132 MMHG | BODY MASS INDEX: 18.55 KG/M2 | WEIGHT: 140 LBS | DIASTOLIC BLOOD PRESSURE: 86 MMHG | TEMPERATURE: 97.8 F

## 2023-06-17 DIAGNOSIS — S62.102A CLOSED FRACTURE OF LEFT WRIST, INITIAL ENCOUNTER: Primary | ICD-10-CM

## 2023-06-17 LAB
BASOPHILS # BLD: 0 K/UL (ref 0–0.1)
BASOPHILS NFR BLD: 1 % (ref 0–2)
DIFFERENTIAL METHOD BLD: ABNORMAL
EOSINOPHIL # BLD: 0.1 K/UL (ref 0–0.4)
EOSINOPHIL NFR BLD: 1 % (ref 0–5)
ERYTHROCYTE [DISTWIDTH] IN BLOOD BY AUTOMATED COUNT: 11.8 % (ref 11.6–14.5)
HCT VFR BLD AUTO: 45 % (ref 36–48)
HGB BLD-MCNC: 14.4 G/DL (ref 13–16)
IMM GRANULOCYTES # BLD AUTO: 0 K/UL (ref 0–0.04)
IMM GRANULOCYTES NFR BLD AUTO: 0 % (ref 0–0.5)
LYMPHOCYTES # BLD: 1.3 K/UL (ref 0.9–3.6)
LYMPHOCYTES NFR BLD: 19 % (ref 21–52)
MCH RBC QN AUTO: 31 PG (ref 24–34)
MCHC RBC AUTO-ENTMCNC: 32 G/DL (ref 31–37)
MCV RBC AUTO: 96.8 FL (ref 78–100)
MONOCYTES # BLD: 0.8 K/UL (ref 0.05–1.2)
MONOCYTES NFR BLD: 12 % (ref 3–10)
NEUTS SEG # BLD: 4.8 K/UL (ref 1.8–8)
NEUTS SEG NFR BLD: 68 % (ref 40–73)
NRBC # BLD: 0 K/UL (ref 0–0.01)
NRBC BLD-RTO: 0 PER 100 WBC
PLATELET # BLD AUTO: 150 K/UL (ref 135–420)
PMV BLD AUTO: 11.7 FL (ref 9.2–11.8)
RBC # BLD AUTO: 4.65 M/UL (ref 4.35–5.65)
WBC # BLD AUTO: 7.1 K/UL (ref 4.6–13.2)

## 2023-06-17 PROCEDURE — 6360000002 HC RX W HCPCS: Performed by: HEALTH CARE PROVIDER

## 2023-06-17 PROCEDURE — 96374 THER/PROPH/DIAG INJ IV PUSH: CPT

## 2023-06-17 PROCEDURE — 73100 X-RAY EXAM OF WRIST: CPT

## 2023-06-17 PROCEDURE — 85025 COMPLETE CBC W/AUTO DIFF WBC: CPT

## 2023-06-17 PROCEDURE — 73130 X-RAY EXAM OF HAND: CPT

## 2023-06-17 PROCEDURE — 99284 EMERGENCY DEPT VISIT MOD MDM: CPT

## 2023-06-17 RX ORDER — KETOROLAC TROMETHAMINE 15 MG/ML
15 INJECTION, SOLUTION INTRAMUSCULAR; INTRAVENOUS EVERY 6 HOURS PRN
Status: DISCONTINUED | OUTPATIENT
Start: 2023-06-17 | End: 2023-06-17 | Stop reason: HOSPADM

## 2023-06-17 RX ADMIN — KETOROLAC TROMETHAMINE 15 MG: 15 INJECTION, SOLUTION INTRAMUSCULAR; INTRAVENOUS at 12:08

## 2023-06-17 ASSESSMENT — PAIN DESCRIPTION - PAIN TYPE: TYPE: ACUTE PAIN

## 2023-06-17 ASSESSMENT — PAIN DESCRIPTION - DESCRIPTORS: DESCRIPTORS: THROBBING

## 2023-06-17 ASSESSMENT — ENCOUNTER SYMPTOMS
SHORTNESS OF BREATH: 0
NAUSEA: 0
COUGH: 0
VOMITING: 0
CHEST TIGHTNESS: 0
ABDOMINAL PAIN: 0
DIARRHEA: 0

## 2023-06-17 ASSESSMENT — PAIN DESCRIPTION - ORIENTATION: ORIENTATION: LEFT

## 2023-06-17 ASSESSMENT — PAIN - FUNCTIONAL ASSESSMENT: PAIN_FUNCTIONAL_ASSESSMENT: 0-10

## 2023-06-17 ASSESSMENT — PAIN DESCRIPTION - ONSET: ONSET: GRADUAL

## 2023-06-17 ASSESSMENT — PAIN DESCRIPTION - LOCATION: LOCATION: WRIST;HAND

## 2023-06-17 ASSESSMENT — PAIN SCALES - GENERAL: PAINLEVEL_OUTOF10: 10

## 2023-06-17 NOTE — ED TRIAGE NOTES
Patient complains of falling on left hand. Woke up this morning with pain and swelling in left hand and wrist. 10/10 pain.

## 2023-06-17 NOTE — ED PROVIDER NOTES
EMERGENCY DEPARTMENT HISTORY AND PHYSICAL EXAM        Date: 6/17/2023  Patient Name: Darlin Whitlock    History of Presenting Illness     Chief Complaint   Patient presents with    Wrist Injury     Patient fell onto left hand yesterday at 1100. History Provided By: History obtained from patient  Accompanied in the ED by none    HPI: Darlin Whitlock, 70 y.o. male PMHx significant for no chronic conditions or long-term medications presents self ambulatory to the ED with cc of left wrist pain x 12 hours  Onset: Knocked over by chirag of wind yesterday evening  Location: Pain is diffuse, worse at dorsal aspect second third carpal proximal.  Associated symptoms: Denies any numbness tingling or weakness in that hand. Severity: 10/10    No nausea, vomiting, diarrhea, fever, chills, chest pain, shortness of breath, leg swelling     There are no other complaints, changes, or physical findings at this time. PCP: Bella Khalli MD    No current facility-administered medications on file prior to encounter. No current outpatient medications on file prior to encounter. Past History     Past Medical History:  Past Medical History:   Diagnosis Date    Diabetes ([facility] [address]     ETOH abuse     Hepatitis C     Hypertension     Seizures ([facility] [address]     last seizure few weeks ago (12/8/14), gets them periodically       Past Surgical History:  Past Surgical History:   Procedure Laterality Date    ORTHOPEDIC SURGERY Left     foot operation       Family History:  No family history on file. Social History:  Social History     Tobacco Use    Smoking status: Every Day     Packs/day: 0.50     Types: Cigarettes    Smokeless tobacco: Never   Substance Use Topics    Alcohol use: Yes     Alcohol/week: 56.0 standard drinks    Drug use: No       Allergies:  No Known Allergies      Review of Systems   Review of Systems   Constitutional:  Negative for appetite change, fatigue and fever.    Respiratory:  Negative for cough, chest

## 2023-06-19 ENCOUNTER — HOSPITAL ENCOUNTER (EMERGENCY)
Facility: HOSPITAL | Age: 63
Discharge: HOME | End: 2023-06-19
Attending: EMERGENCY MEDICINE
Payer: MEDICAID

## 2023-06-19 VITALS
HEART RATE: 84 BPM | WEIGHT: 140 LBS | DIASTOLIC BLOOD PRESSURE: 89 MMHG | BODY MASS INDEX: 18.55 KG/M2 | OXYGEN SATURATION: 100 % | TEMPERATURE: 98.7 F | RESPIRATION RATE: 15 BRPM | HEIGHT: 73 IN | SYSTOLIC BLOOD PRESSURE: 120 MMHG

## 2023-06-19 DIAGNOSIS — S62.002D CLOSED NONDISPLACED FRACTURE OF SCAPHOID OF LEFT WRIST WITH ROUTINE HEALING, UNSPECIFIED PORTION OF SCAPHOID, SUBSEQUENT ENCOUNTER: Primary | ICD-10-CM

## 2023-06-19 PROCEDURE — 6370000000 HC RX 637 (ALT 250 FOR IP): Performed by: EMERGENCY MEDICINE

## 2023-06-19 PROCEDURE — 99283 EMERGENCY DEPT VISIT LOW MDM: CPT

## 2023-06-19 RX ORDER — ACETAMINOPHEN 500 MG
1000 TABLET ORAL 4 TIMES DAILY PRN
Qty: 30 TABLET | Refills: 1 | Status: SHIPPED | OUTPATIENT
Start: 2023-06-19

## 2023-06-19 RX ORDER — OXYCODONE HYDROCHLORIDE AND ACETAMINOPHEN 5; 325 MG/1; MG/1
1 TABLET ORAL
Status: COMPLETED | OUTPATIENT
Start: 2023-06-19 | End: 2023-06-19

## 2023-06-19 RX ADMIN — OXYCODONE AND ACETAMINOPHEN 1 TABLET: 325; 5 TABLET ORAL at 12:21

## 2023-06-19 ASSESSMENT — PAIN SCALES - GENERAL
PAINLEVEL_OUTOF10: 8
PAINLEVEL_OUTOF10: 10

## 2023-06-19 ASSESSMENT — PAIN DESCRIPTION - ORIENTATION
ORIENTATION: LEFT
ORIENTATION: LEFT

## 2023-06-19 ASSESSMENT — PAIN - FUNCTIONAL ASSESSMENT: PAIN_FUNCTIONAL_ASSESSMENT: 0-10

## 2023-06-19 ASSESSMENT — PAIN DESCRIPTION - LOCATION
LOCATION: WRIST
LOCATION: HAND

## 2023-06-19 NOTE — ED NOTES
Medicated per MAR. Seen and discharged by provider. Discharge papers with instructions given, verbalized understanding. Ambulated to the exit without difficulty.      Mena Banks RN  06/19/23 4815

## 2023-06-19 NOTE — ED TRIAGE NOTES
Pt c/o left arm pain and swelling, pt was seen here for left wrist fx yesterday and had a splint placed on left wrist, per pt he has more pain and swelling today. Denies any tingling/ numbing sensation/ recurrent injury. Per pt he was not given any pain prescription.

## 2023-06-19 NOTE — ED NOTES
Pt's ride [address], [address] [facility]  06/19/23 9028

## 2023-06-19 NOTE — ED PROVIDER NOTES
[facility] EMERGENCY DEPT  EMERGENCY DEPARTMENT ENCOUNTER      Pt Name: Grisel Dixon  MRN: [id number]  [place] 1960  Date of evaluation: 6/19/2023  Provider: Francia Pleitez, [address] [place] [address]       Chief Complaint   Patient presents with    Arm Swelling    Other     Here to get recheck for left wrist fx         HISTORY OF PRESENT ILLNESS   (Location/Symptom, Timing/Onset, Context/Setting, Quality, Duration, Modifying Factors, Severity)  Note limiting factors. Grisel Dixon is a 70 y.o. male who presents to the emergency department complaint of persistent swelling to his left hand. Patient said he tripped and fell 2 days ago and is not sure why he still having persistent swelling. Came to the emergency department and is wearing a thumb spica splint. Has not made follow-up appointment. He called his primary care doctor but it being a holiday office was closed. He is right-hand dominant. HPI    Nursing Notes were reviewed. REVIEW OF SYSTEMS    (2-9 systems for level 4, 10 or more for level 5)     Review of Systems   Musculoskeletal:  Positive for arthralgias and joint swelling. Neurological:  Negative for weakness and numbness. Except as noted above the remainder of the review of systems was reviewed and negative. PAST MEDICAL HISTORY     Past Medical History:   Diagnosis Date    Diabetes ([address]     ETOH abuse     Hepatitis C     Hypertension     Seizures ([address]     last seizure few weeks ago (12/8/14), gets them periodically         SURGICAL HISTORY       Past Surgical History:   Procedure Laterality Date    ORTHOPEDIC SURGERY Left     foot operation         CURRENT MEDICATIONS       Previous Medications    No medications on file       ALLERGIES     Patient has no known allergies. FAMILY HISTORY     No family history on file.        SOCIAL HISTORY       Social History     Socioeconomic History    Marital status:    Tobacco Use    Smoking status: Every Day     Packs/day: 0.50

## 2023-06-20 ENCOUNTER — TELEPHONE (OUTPATIENT)
Age: 63
End: 2023-06-20

## 2023-06-20 NOTE — TELEPHONE ENCOUNTER
Sisi Guevara, family friend of patient, is trying to set up a follow up appointment from ED for left wrist fracture - seen twice in the last week there. Brenda Michael is contacting insurance now to begin setting up transportation arrangements and is requesting a call back as soon as possible with appt information, [phone].

## 2023-06-28 ENCOUNTER — OFFICE VISIT (OUTPATIENT)
Age: 63
End: 2023-06-28

## 2023-06-28 VITALS — WEIGHT: 140 LBS | TEMPERATURE: 98.2 F | HEIGHT: 73 IN | BODY MASS INDEX: 18.55 KG/M2

## 2023-06-28 DIAGNOSIS — S63.502A LEFT WRIST SPRAIN, INITIAL ENCOUNTER: Primary | ICD-10-CM

## 2024-04-12 ENCOUNTER — HOSPITAL ENCOUNTER (EMERGENCY)
Facility: HOSPITAL | Age: 64
Discharge: HOME | End: 2024-04-12
Payer: MEDICAID

## 2024-04-12 VITALS
BODY MASS INDEX: 17.97 KG/M2 | DIASTOLIC BLOOD PRESSURE: 87 MMHG | RESPIRATION RATE: 18 BRPM | TEMPERATURE: 97.5 F | HEIGHT: 74 IN | WEIGHT: 140 LBS | OXYGEN SATURATION: 98 % | HEART RATE: 65 BPM | SYSTOLIC BLOOD PRESSURE: 126 MMHG

## 2024-04-12 DIAGNOSIS — G40.909 SEIZURE DISORDER (HCC): Primary | ICD-10-CM

## 2024-04-12 DIAGNOSIS — F10.10 ALCOHOL ABUSE: ICD-10-CM

## 2024-04-12 LAB
ALBUMIN SERPL-MCNC: 3.3 G/DL (ref 3.4–5)
ALBUMIN/GLOB SERPL: 0.6 (ref 0.8–1.7)
ALP SERPL-CCNC: 104 U/L (ref 45–117)
ALT SERPL-CCNC: 92 U/L (ref 16–61)
ANION GAP SERPL CALC-SCNC: 6 MMOL/L (ref 3–18)
AST SERPL-CCNC: 102 U/L (ref 10–38)
BASOPHILS # BLD: 0 K/UL (ref 0–0.1)
BASOPHILS NFR BLD: 0 % (ref 0–2)
BILIRUB SERPL-MCNC: 0.5 MG/DL (ref 0.2–1)
BUN SERPL-MCNC: 15 MG/DL (ref 7–18)
BUN/CREAT SERPL: 15 (ref 12–20)
CALCIUM SERPL-MCNC: 9.3 MG/DL (ref 8.5–10.1)
CHLORIDE SERPL-SCNC: 102 MMOL/L (ref 100–111)
CO2 SERPL-SCNC: 27 MMOL/L (ref 21–32)
CREAT SERPL-MCNC: 1.02 MG/DL (ref 0.6–1.3)
DIFFERENTIAL METHOD BLD: ABNORMAL
EOSINOPHIL # BLD: 0.1 K/UL (ref 0–0.4)
EOSINOPHIL NFR BLD: 1 % (ref 0–5)
ERYTHROCYTE [DISTWIDTH] IN BLOOD BY AUTOMATED COUNT: 11.7 % (ref 11.6–14.5)
ETHANOL SERPL-MCNC: <3 MG/DL (ref 0–3)
GLOBULIN SER CALC-MCNC: 5.5 G/DL (ref 2–4)
GLUCOSE SERPL-MCNC: 85 MG/DL (ref 74–99)
HCT VFR BLD AUTO: 43 % (ref 36–48)
HGB BLD-MCNC: 13.8 G/DL (ref 13–16)
IMM GRANULOCYTES # BLD AUTO: 0 K/UL (ref 0–0.04)
IMM GRANULOCYTES NFR BLD AUTO: 0 % (ref 0–0.5)
LYMPHOCYTES # BLD: 1 K/UL (ref 0.9–3.6)
LYMPHOCYTES NFR BLD: 22 % (ref 21–52)
MCH RBC QN AUTO: 30.8 PG (ref 24–34)
MCHC RBC AUTO-ENTMCNC: 32.1 G/DL (ref 31–37)
MCV RBC AUTO: 96 FL (ref 78–100)
MONOCYTES # BLD: 0.3 K/UL (ref 0.05–1.2)
MONOCYTES NFR BLD: 6 % (ref 3–10)
NEUTS SEG # BLD: 3.2 K/UL (ref 1.8–8)
NEUTS SEG NFR BLD: 70 % (ref 40–73)
NRBC # BLD: 0 K/UL (ref 0–0.01)
NRBC BLD-RTO: 0 PER 100 WBC
PHENYTOIN SERPL-MCNC: 22.6 UG/ML (ref 10–20)
PLATELET # BLD AUTO: 126 K/UL (ref 135–420)
PMV BLD AUTO: 10.7 FL (ref 9.2–11.8)
POTASSIUM SERPL-SCNC: 3.9 MMOL/L (ref 3.5–5.5)
PROT SERPL-MCNC: 8.8 G/DL (ref 6.4–8.2)
RBC # BLD AUTO: 4.48 M/UL (ref 4.35–5.65)
SODIUM SERPL-SCNC: 135 MMOL/L (ref 136–145)
WBC # BLD AUTO: 4.6 K/UL (ref 4.6–13.2)

## 2024-04-12 PROCEDURE — 2580000003 HC RX 258: Performed by: EMERGENCY MEDICINE

## 2024-04-12 PROCEDURE — 99284 EMERGENCY DEPT VISIT MOD MDM: CPT

## 2024-04-12 PROCEDURE — 6360000002 HC RX W HCPCS: Performed by: EMERGENCY MEDICINE

## 2024-04-12 PROCEDURE — 80184 ASSAY OF PHENOBARBITAL: CPT

## 2024-04-12 PROCEDURE — 96365 THER/PROPH/DIAG IV INF INIT: CPT

## 2024-04-12 PROCEDURE — 80053 COMPREHEN METABOLIC PANEL: CPT

## 2024-04-12 PROCEDURE — 82077 ASSAY SPEC XCP UR&BREATH IA: CPT

## 2024-04-12 PROCEDURE — 80185 ASSAY OF PHENYTOIN TOTAL: CPT

## 2024-04-12 PROCEDURE — 85025 COMPLETE CBC W/AUTO DIFF WBC: CPT

## 2024-04-12 PROCEDURE — 94761 N-INVAS EAR/PLS OXIMETRY MLT: CPT

## 2024-04-12 RX ORDER — CHLORDIAZEPOXIDE HYDROCHLORIDE 25 MG/1
CAPSULE, GELATIN COATED ORAL
Qty: 20 CAPSULE | Refills: 0 | Status: SHIPPED | OUTPATIENT
Start: 2024-04-12 | End: 2024-04-15

## 2024-04-12 RX ADMIN — PHENYTOIN SODIUM 1000 MG: 50 INJECTION INTRAMUSCULAR; INTRAVENOUS at 15:09

## 2024-04-12 ASSESSMENT — ENCOUNTER SYMPTOMS
SHORTNESS OF BREATH: 0
EYES NEGATIVE: 1
ALLERGIC/IMMUNOLOGIC NEGATIVE: 1
GASTROINTESTINAL NEGATIVE: 1

## 2024-04-12 ASSESSMENT — PAIN - FUNCTIONAL ASSESSMENT: PAIN_FUNCTIONAL_ASSESSMENT: NONE - DENIES PAIN

## 2024-04-12 NOTE — ED NOTES
Pt Phenytoin level back elevated, stopped phenytoin. Pt endorses to drinking \"a 6 pack a day\" has not drank since early yesterday morning.

## 2024-04-12 NOTE — ED PROVIDER NOTES
[facility] EMERGENCY DEPT  EMERGENCY DEPARTMENT ENCOUNTER      Pt Name: Ajay Medrano  MRN: [id number]  Birthdate 1960  Date of evaluation: 4/12/2024  Provider: DICK Duncan  4:18 PM    CHIEF COMPLAINT       Chief Complaint   Patient presents with    Seizures         HISTORY OF PRESENT ILLNESS    Ajay Medrano is a 64 y.o. male who presents to the emergency department with likely seizure episode.  Lives at group home w/his wife.  Takes phenobarbital and dilantin.  Shows me his net appt card.  Is A&O to person, place, time.     HPI    Nursing Notes were reviewed.    REVIEW OF SYSTEMS       Review of Systems   Constitutional: Negative.    HENT: Negative.     Eyes: Negative.    Respiratory:  Negative for shortness of breath.    Cardiovascular:  Negative for chest pain.   Gastrointestinal: Negative.    Endocrine: Negative.    Genitourinary: Negative.    Musculoskeletal:  Negative for myalgias.   Skin: Negative.    Allergic/Immunologic: Negative.    Neurological:  Positive for seizures.   Hematological: Negative.    Psychiatric/Behavioral: Negative.         Except as noted above the remainder of the review of systems was reviewed and negative.       PAST MEDICAL HISTORY     Past Medical History:   Diagnosis Date    Diabetes (HCC)     ETOH abuse     Hepatitis C     Hypertension     Seizures (HCC)     last seizure few weeks ago (12/8/14), gets them periodically         SURGICAL HISTORY       Past Surgical History:   Procedure Laterality Date    ORTHOPEDIC SURGERY Left     foot operation         CURRENT MEDICATIONS       Previous Medications    ACETAMINOPHEN (TYLENOL) 500 MG TABLET    Take 2 tablets by mouth 4 times daily as needed for Pain    METFORMIN (GLUCOPHAGE) 500 MG TABLET    Take 1 tablet by mouth 2 times daily (with meals)       ALLERGIES     Patient has no known allergies.    FAMILY HISTORY     No family history on file.       SOCIAL HISTORY       Social History     Socioeconomic History    Marital

## 2024-04-12 NOTE — ED TRIAGE NOTES
Assumed care of pt in rm 15. Pt arrived from podiatry office for \"seizure like activity\". Pt has a hx of and was found crawling in office. Pt arrived Alert to self, unsure who the president is or what month it is. Hx of seizures. Pt placed on BP and O2. Orders noted.

## 2024-04-13 LAB
EKG ATRIAL RATE: 67 BPM
EKG DIAGNOSIS: NORMAL
EKG P AXIS: 34 DEGREES
EKG P-R INTERVAL: 132 MS
EKG Q-T INTERVAL: 428 MS
EKG QRS DURATION: 74 MS
EKG QTC CALCULATION (BAZETT): 452 MS
EKG R AXIS: 53 DEGREES
EKG T AXIS: 44 DEGREES
EKG VENTRICULAR RATE: 67 BPM

## 2024-04-19 LAB — PHENOBARB SERPL-MCNC: 17 MCG/ML (ref 15–35)

## 2024-10-28 PROBLEM — I63.9 ACUTE CVA (CEREBROVASCULAR ACCIDENT) (HCC): Status: ACTIVE | Noted: 2024-10-28

## 2025-05-09 ENCOUNTER — APPOINTMENT (OUTPATIENT)
Facility: HOSPITAL | Age: 65
End: 2025-05-09
Payer: MEDICAID

## 2025-05-09 ENCOUNTER — HOSPITAL ENCOUNTER (EMERGENCY)
Facility: HOSPITAL | Age: 65
Discharge: HOME | End: 2025-05-09
Attending: EMERGENCY MEDICINE
Payer: MEDICAID

## 2025-05-09 VITALS
RESPIRATION RATE: 14 BRPM | BODY MASS INDEX: 17.49 KG/M2 | OXYGEN SATURATION: 100 % | HEIGHT: 73 IN | WEIGHT: 132 LBS | SYSTOLIC BLOOD PRESSURE: 158 MMHG | HEART RATE: 78 BPM | TEMPERATURE: 98.3 F | DIASTOLIC BLOOD PRESSURE: 78 MMHG

## 2025-05-09 DIAGNOSIS — R19.7 DIARRHEA OF PRESUMED INFECTIOUS ORIGIN: Primary | ICD-10-CM

## 2025-05-09 LAB
ALBUMIN SERPL-MCNC: 3 G/DL (ref 3.4–5)
ALBUMIN/GLOB SERPL: 0.7 (ref 0.8–1.7)
ALP SERPL-CCNC: 124 U/L (ref 45–117)
ALT SERPL-CCNC: 83 U/L (ref 10–50)
AMPHET UR QL SCN: NEGATIVE
ANION GAP SERPL CALC-SCNC: 8 MMOL/L (ref 3–18)
APPEARANCE UR: CLEAR
AST SERPL-CCNC: 84 U/L (ref 10–38)
BARBITURATES UR QL SCN: POSITIVE
BASOPHILS # BLD: 0.02 K/UL (ref 0–0.1)
BASOPHILS NFR BLD: 0.4 % (ref 0–2)
BENZODIAZ UR QL: NEGATIVE
BILIRUB SERPL-MCNC: 0.3 MG/DL (ref 0.2–1)
BILIRUB UR QL: NEGATIVE
BUN SERPL-MCNC: 8 MG/DL (ref 6–23)
BUN/CREAT SERPL: 11 (ref 12–20)
CALCIUM SERPL-MCNC: 8.8 MG/DL (ref 8.5–10.1)
CANNABINOIDS UR QL SCN: NEGATIVE
CHLORIDE SERPL-SCNC: 100 MMOL/L (ref 98–107)
CO2 SERPL-SCNC: 26 MMOL/L (ref 21–32)
COCAINE UR QL SCN: NEGATIVE
COLOR UR: YELLOW
CREAT SERPL-MCNC: 0.74 MG/DL (ref 0.6–1.3)
DIFFERENTIAL METHOD BLD: ABNORMAL
EOSINOPHIL # BLD: 0.07 K/UL (ref 0–0.4)
EOSINOPHIL NFR BLD: 1.3 % (ref 0–5)
ERYTHROCYTE [DISTWIDTH] IN BLOOD BY AUTOMATED COUNT: 13.2 % (ref 11.6–14.5)
ETHANOL SERPL-MCNC: <11 MG/DL (ref 0–0.08)
FENTANYL: NEGATIVE
GLOBULIN SER CALC-MCNC: 4.3 G/DL (ref 2–4)
GLUCOSE SERPL-MCNC: 93 MG/DL (ref 74–108)
GLUCOSE UR STRIP.AUTO-MCNC: NEGATIVE MG/DL
HCT VFR BLD AUTO: 37.3 % (ref 36–48)
HGB BLD-MCNC: 11.7 G/DL (ref 13–16)
HGB UR QL STRIP: NEGATIVE
IMM GRANULOCYTES # BLD AUTO: 0.02 K/UL (ref 0–0.04)
IMM GRANULOCYTES NFR BLD AUTO: 0.4 % (ref 0–0.5)
KETONES UR QL STRIP.AUTO: NEGATIVE MG/DL
LEUKOCYTE ESTERASE UR QL STRIP.AUTO: NEGATIVE
LIPASE SERPL-CCNC: 23 U/L (ref 13–75)
LYMPHOCYTES # BLD: 1.19 K/UL (ref 0.9–3.6)
LYMPHOCYTES NFR BLD: 21.4 % (ref 21–52)
Lab: ABNORMAL
MCH RBC QN AUTO: 31 PG (ref 24–34)
MCHC RBC AUTO-ENTMCNC: 31.4 G/DL (ref 31–37)
MCV RBC AUTO: 98.7 FL (ref 78–100)
METHADONE UR QL: NEGATIVE
MONOCYTES # BLD: 0.41 K/UL (ref 0.05–1.2)
MONOCYTES NFR BLD: 7.4 % (ref 3–10)
NEUTS SEG # BLD: 3.85 K/UL (ref 1.8–8)
NEUTS SEG NFR BLD: 69.1 % (ref 40–73)
NITRITE UR QL STRIP.AUTO: NEGATIVE
NRBC # BLD: 0 K/UL (ref 0–0.01)
NRBC BLD-RTO: 0 PER 100 WBC
OPIATES UR QL: NEGATIVE
OXYCODONE UR QL SCN: NEGATIVE
PH UR STRIP: 6 (ref 5–8)
PLATELET # BLD AUTO: 129 K/UL (ref 135–420)
PMV BLD AUTO: 10.5 FL (ref 9.2–11.8)
POTASSIUM SERPL-SCNC: 4.7 MMOL/L (ref 3.5–5.5)
PROT SERPL-MCNC: 7.3 G/DL (ref 6.4–8.2)
PROT UR STRIP-MCNC: NEGATIVE MG/DL
RBC # BLD AUTO: 3.78 M/UL (ref 4.35–5.65)
SODIUM SERPL-SCNC: 134 MMOL/L (ref 136–145)
SP GR UR REFRACTOMETRY: 1.03 (ref 1–1.03)
UROBILINOGEN UR QL STRIP.AUTO: 0.2 EU/DL (ref 0.2–1)
WBC # BLD AUTO: 5.6 K/UL (ref 4.6–13.2)

## 2025-05-09 PROCEDURE — 82077 ASSAY SPEC XCP UR&BREATH IA: CPT

## 2025-05-09 PROCEDURE — 85025 COMPLETE CBC W/AUTO DIFF WBC: CPT

## 2025-05-09 PROCEDURE — 94761 N-INVAS EAR/PLS OXIMETRY MLT: CPT

## 2025-05-09 PROCEDURE — 99285 EMERGENCY DEPT VISIT HI MDM: CPT

## 2025-05-09 PROCEDURE — 81003 URINALYSIS AUTO W/O SCOPE: CPT

## 2025-05-09 PROCEDURE — 6360000004 HC RX CONTRAST MEDICATION: Performed by: EMERGENCY MEDICINE

## 2025-05-09 PROCEDURE — 74177 CT ABD & PELVIS W/CONTRAST: CPT

## 2025-05-09 PROCEDURE — 80307 DRUG TEST PRSMV CHEM ANLYZR: CPT

## 2025-05-09 PROCEDURE — 80053 COMPREHEN METABOLIC PANEL: CPT

## 2025-05-09 PROCEDURE — 83690 ASSAY OF LIPASE: CPT

## 2025-05-09 RX ORDER — FAMOTIDINE 20 MG/1
20 TABLET, FILM COATED ORAL 2 TIMES DAILY
Qty: 60 TABLET | Refills: 0 | Status: SHIPPED | OUTPATIENT
Start: 2025-05-09

## 2025-05-09 RX ORDER — IOPAMIDOL 612 MG/ML
100 INJECTION, SOLUTION INTRAVASCULAR
Status: COMPLETED | OUTPATIENT
Start: 2025-05-09 | End: 2025-05-09

## 2025-05-09 RX ADMIN — IOPAMIDOL 100 ML: 612 INJECTION, SOLUTION INTRAVENOUS at 10:25

## 2025-05-09 ASSESSMENT — LIFESTYLE VARIABLES
HOW OFTEN DO YOU HAVE A DRINK CONTAINING ALCOHOL: 2-3 TIMES A WEEK
HOW MANY STANDARD DRINKS CONTAINING ALCOHOL DO YOU HAVE ON A TYPICAL DAY: 3 OR 4

## 2025-05-09 ASSESSMENT — PAIN - FUNCTIONAL ASSESSMENT: PAIN_FUNCTIONAL_ASSESSMENT: NONE - DENIES PAIN

## 2025-05-09 NOTE — ED TRIAGE NOTES
Patient is c/o diarrhea and stool incontinence times 1.5 week. Pt denies abdominal pain. Last BM 0630AM prompting call to EMS.

## 2025-05-09 NOTE — ED PROVIDER NOTES
EMERGENCY DEPARTMENT HISTORY AND PHYSICAL EXAM      Patient Name: Ajay Medrano  MRN: [id number]  YOB: 1960  Provider: Gia Louis MD  PCP: Chandni Manley MD   Time/Date of evaluation: 9:25 AM EDT on 5/9/25    History of Presenting Illness     Chief Complaint   Patient presents with    Diarrhea     Patient states he's been experiencing episodes of diarrhea and constipation since last week.        History Provided By: EMS and Patient     History (Narative):   Ajay Medrano is a 65 y.o. male with a PMHX of diabetes, alcohol abuse, hepatitis C, hypertension and seizures  who presents to the emergency department  by EMS C/O diarrhea for the past 1-1/2 weeks.  The patient states that his wife cooks approximately 5 days/week.  Every time he eats, he ends up having to go to the bathroom with watery diarrhea within minutes of eating.  He states that he has some abdominal cramping just prior but then everything just comes out of his water.    He denies any nausea or vomiting.  He states that he does drink alcohol every day.  He has not had any changes with alcohol consumption.  He states that after he eats, he tries to lay down but he has to get up immediately to use the restroom.  He is at the point where he is having difficulty holding it until he gets into the bathroom.        Past History     Past Medical History:  Past Medical History:   Diagnosis Date    Diabetes (HCC)     ETOH abuse     Hepatitis C     Hypertension     Seizures (HCC)     last seizure few weeks ago (12/8/14), gets them periodically       Past Surgical History:  Past Surgical History:   Procedure Laterality Date    ORTHOPEDIC SURGERY Left     foot operation       Family History:  No family history on file.    Social History:  Social History     Tobacco Use    Smoking status: Every Day     Current packs/day: 0.50     Types: Cigarettes    Smokeless tobacco: Never   Substance Use Topics    Alcohol use: Yes     Alcohol/week: 56.0

## 2025-07-27 ENCOUNTER — HOSPITAL ENCOUNTER (EMERGENCY)
Facility: HOSPITAL | Age: 65
Discharge: HOME | End: 2025-07-27
Payer: MEDICAID

## 2025-07-27 VITALS
TEMPERATURE: 98.8 F | HEART RATE: 80 BPM | OXYGEN SATURATION: 98 % | RESPIRATION RATE: 16 BRPM | SYSTOLIC BLOOD PRESSURE: 123 MMHG | DIASTOLIC BLOOD PRESSURE: 82 MMHG

## 2025-07-27 DIAGNOSIS — K04.7 DENTAL ABSCESS: Primary | ICD-10-CM

## 2025-07-27 PROCEDURE — 6370000000 HC RX 637 (ALT 250 FOR IP): Performed by: PHYSICIAN ASSISTANT

## 2025-07-27 PROCEDURE — 99283 EMERGENCY DEPT VISIT LOW MDM: CPT

## 2025-07-27 RX ORDER — CHLORHEXIDINE GLUCONATE ORAL RINSE 1.2 MG/ML
15 SOLUTION DENTAL 2 TIMES DAILY
Qty: 420 ML | Refills: 0 | Status: SHIPPED | OUTPATIENT
Start: 2025-07-27 | End: 2025-08-10

## 2025-07-27 RX ADMIN — AMOXICILLIN AND CLAVULANATE POTASSIUM 1 TABLET: 875; 125 TABLET, FILM COATED ORAL at 17:08

## 2025-07-27 NOTE — ED PROVIDER NOTES
EMERGENCY DEPARTMENT HISTORY AND PHYSICAL EXAM    Date: 7/27/2025  Patient Name: Ajay Medrano    History of Presenting Illness     Chief Complaint:   Chief Complaint   Patient presents with    Dental Pain     History Provided By: patient     Additional History (Context): Ajay Medrano is a 65 y.o. male with hx of NIDDM2, HTN, EtOH abuse, hep C, seizures, presents ambulatory via EMS  c/o right lower first molar area pain and swelling of the gum and adjacent cheek.  He reports minimal pain and swelling gradually developing over 3-4 days  No remedies attempted.  Patient denies any throat or tongue swelling, control secretions without difficulty.  Patient has no dentist but is looking for one (med insurance card has ph # for dental services but has not used it yet).  Denies fevers, chills, sinus pain, rhinorrhea, swollen lymph nodes, sore throat, headache, or other complaints.     PCP: Chandni Manley MD    No current facility-administered medications for this encounter.     Current Outpatient Medications   Medication Sig Dispense Refill    amoxicillin-clavulanate (AUGMENTIN) 875-125 MG per tablet Take 1 tablet by mouth 2 times daily for 10 days 20 tablet 0    chlorhexidine (PERIDEX) 0.12 % solution Swish and spit 15 mLs 2 times daily for 14 days 420 mL 0    famotidine (PEPCID) 20 MG tablet Take 1 tablet by mouth 2 times daily 60 tablet 0    naltrexone (DEPADE) 50 MG tablet Take 1 tablet by mouth nightly 30 tablet 0    folic acid (FOLVITE) 1 MG tablet Take 1 tablet by mouth daily 30 tablet 3    phenytoin (PHENYTEK) 200 MG ER capsule Take 1 capsule by mouth daily 30 capsule 1    sertraline (ZOLOFT) 50 MG tablet Take 1 tablet by mouth daily 30 tablet 1    acetaminophen (PAIN RELIEF EXTRA STRENGTH) 500 MG tablet Take 1 tablet by mouth every 6 hours as needed for Pain 120 tablet 3    sublingual tablet cyanocobalamin 2500 MCG SUBL Place 1 tablet under the tongue daily 100 tablet 1    aspirin 81 MG EC tablet

## (undated) DEVICE — STERILE POLYISOPRENE POWDER-FREE SURGICAL GLOVES: Brand: PROTEXIS

## (undated) DEVICE — DRAPE XR C ARM 41X74IN LF --

## (undated) DEVICE — DRESSING,GAUZE,XEROFORM,CURAD,1"X8",ST: Brand: CURAD

## (undated) DEVICE — KIT CLN UP BON SECOURS MARYV

## (undated) DEVICE — REM POLYHESIVE ADULT PATIENT RETURN ELECTRODE: Brand: VALLEYLAB

## (undated) DEVICE — 3.2MM GUIDE WIRE 400MM

## (undated) DEVICE — PACK SURG BSHR TOT KNEE LF

## (undated) DEVICE — BANDAGE, HONEYCOMB ELAS 4IN: Brand: CARDINAL HEALTH

## (undated) DEVICE — Device

## (undated) DEVICE — SHOE, POST-OPERATIVE: Brand: DEROYAL

## (undated) DEVICE — BIT DRL L330MM DIA4.2MM CALIB 100MM 3 FLUT QUIK CPL

## (undated) DEVICE — PREP SKN CHLRAPRP APL 26ML STR --

## (undated) DEVICE — BIT DRL L330MM DIA3.2MM CALIB L100MM NONSTERILE 3 FLUT QUIK

## (undated) DEVICE — BIT DRL L145MM DIA4.2MM NONSTERILE 3 FLUT NDL PNT QUIK CPL

## (undated) DEVICE — SUTURE VCRL SZ 0 L27IN ABSRB UD L36MM CT-1 1/2 CIR J260H

## (undated) DEVICE — ZIMMER® STERILE DISPOSABLE TOURNIQUET CUFF WITH PLC, DUAL PORT, SINGLE BLADDER, 34 IN. (86 CM)

## (undated) DEVICE — SUTURE ABSORBABLE BRAIDED 2-0 CT-1 27 IN UD VICRYL J259H

## (undated) DEVICE — C-ARMOR C-ARM EQUIPMENT COVERS CLEAR STERILE UNIVERSAL FIT 12 PER CASE: Brand: C-ARMOR

## (undated) DEVICE — ROD RMR L950MM DIA2.5MM W/ EXTN BALL TIP

## (undated) DEVICE — 3M™ STERI-DRAPE™ U-DRAPE 1015: Brand: STERI-DRAPE™

## (undated) DEVICE — INTENDED FOR TISSUE SEPARATION, AND OTHER PROCEDURES THAT REQUIRE A SHARP SURGICAL BLADE TO PUNCTURE OR CUT.: Brand: BARD-PARKER SAFETY BLADES SIZE 10, STERILE

## (undated) DEVICE — SOLUTION IV 1000ML 0.9% SOD CHL

## (undated) DEVICE — FLEX ADVANTAGE 3000CC: Brand: FLEX ADVANTAGE